# Patient Record
Sex: MALE | Race: BLACK OR AFRICAN AMERICAN | NOT HISPANIC OR LATINO | URBAN - METROPOLITAN AREA
[De-identification: names, ages, dates, MRNs, and addresses within clinical notes are randomized per-mention and may not be internally consistent; named-entity substitution may affect disease eponyms.]

---

## 2018-10-13 ENCOUNTER — INPATIENT (INPATIENT)
Facility: HOSPITAL | Age: 63
LOS: 4 days | Discharge: HOME HEALTH | End: 2018-10-18
Attending: INTERNAL MEDICINE | Admitting: INTERNAL MEDICINE
Payer: MEDICAID

## 2018-10-13 VITALS
OXYGEN SATURATION: 98 % | RESPIRATION RATE: 18 BRPM | DIASTOLIC BLOOD PRESSURE: 96 MMHG | SYSTOLIC BLOOD PRESSURE: 170 MMHG | HEART RATE: 51 BPM | TEMPERATURE: 98 F

## 2018-10-13 DIAGNOSIS — M79.604 PAIN IN RIGHT LEG: ICD-10-CM

## 2018-10-13 DIAGNOSIS — M79.606 PAIN IN LEG, UNSPECIFIED: ICD-10-CM

## 2018-10-13 DIAGNOSIS — I10 ESSENTIAL (PRIMARY) HYPERTENSION: ICD-10-CM

## 2018-10-13 DIAGNOSIS — Z29.9 ENCOUNTER FOR PROPHYLACTIC MEASURES, UNSPECIFIED: ICD-10-CM

## 2018-10-13 DIAGNOSIS — R00.1 BRADYCARDIA, UNSPECIFIED: ICD-10-CM

## 2018-10-13 DIAGNOSIS — E11.9 TYPE 2 DIABETES MELLITUS WITHOUT COMPLICATIONS: ICD-10-CM

## 2018-10-13 DIAGNOSIS — H91.90 UNSPECIFIED HEARING LOSS, UNSPECIFIED EAR: ICD-10-CM

## 2018-10-13 DIAGNOSIS — F17.200 NICOTINE DEPENDENCE, UNSPECIFIED, UNCOMPLICATED: ICD-10-CM

## 2018-10-13 LAB
ALBUMIN SERPL ELPH-MCNC: 4.3 G/DL — SIGNIFICANT CHANGE UP (ref 3.3–5)
ALP SERPL-CCNC: 70 U/L — SIGNIFICANT CHANGE UP (ref 40–120)
ALT FLD-CCNC: 20 U/L — SIGNIFICANT CHANGE UP (ref 4–41)
APPEARANCE UR: CLEAR — SIGNIFICANT CHANGE UP
AST SERPL-CCNC: 21 U/L — SIGNIFICANT CHANGE UP (ref 4–40)
BASOPHILS # BLD AUTO: 0.05 K/UL — SIGNIFICANT CHANGE UP (ref 0–0.2)
BASOPHILS NFR BLD AUTO: 0.5 % — SIGNIFICANT CHANGE UP (ref 0–2)
BILIRUB SERPL-MCNC: 0.4 MG/DL — SIGNIFICANT CHANGE UP (ref 0.2–1.2)
BILIRUB UR-MCNC: NEGATIVE — SIGNIFICANT CHANGE UP
BLOOD UR QL VISUAL: NEGATIVE — SIGNIFICANT CHANGE UP
BUN SERPL-MCNC: 10 MG/DL — SIGNIFICANT CHANGE UP (ref 7–23)
CALCIUM SERPL-MCNC: 8.6 MG/DL — SIGNIFICANT CHANGE UP (ref 8.4–10.5)
CHLORIDE SERPL-SCNC: 100 MMOL/L — SIGNIFICANT CHANGE UP (ref 98–107)
CK SERPL-CCNC: 739 U/L — HIGH (ref 30–200)
CO2 SERPL-SCNC: 24 MMOL/L — SIGNIFICANT CHANGE UP (ref 22–31)
COLOR SPEC: SIGNIFICANT CHANGE UP
CREAT SERPL-MCNC: 0.77 MG/DL — SIGNIFICANT CHANGE UP (ref 0.5–1.3)
EOSINOPHIL # BLD AUTO: 0.16 K/UL — SIGNIFICANT CHANGE UP (ref 0–0.5)
EOSINOPHIL NFR BLD AUTO: 1.5 % — SIGNIFICANT CHANGE UP (ref 0–6)
GLUCOSE SERPL-MCNC: 131 MG/DL — HIGH (ref 70–99)
GLUCOSE UR-MCNC: NEGATIVE — SIGNIFICANT CHANGE UP
HBA1C BLD-MCNC: 6 % — HIGH (ref 4–5.6)
HCT VFR BLD CALC: 46.7 % — SIGNIFICANT CHANGE UP (ref 39–50)
HGB BLD-MCNC: 16 G/DL — SIGNIFICANT CHANGE UP (ref 13–17)
IMM GRANULOCYTES # BLD AUTO: 0.04 # — SIGNIFICANT CHANGE UP
IMM GRANULOCYTES NFR BLD AUTO: 0.4 % — SIGNIFICANT CHANGE UP (ref 0–1.5)
KETONES UR-MCNC: NEGATIVE — SIGNIFICANT CHANGE UP
LEUKOCYTE ESTERASE UR-ACNC: NEGATIVE — SIGNIFICANT CHANGE UP
LYMPHOCYTES # BLD AUTO: 1.69 K/UL — SIGNIFICANT CHANGE UP (ref 1–3.3)
LYMPHOCYTES # BLD AUTO: 15.8 % — SIGNIFICANT CHANGE UP (ref 13–44)
MCHC RBC-ENTMCNC: 31.8 PG — SIGNIFICANT CHANGE UP (ref 27–34)
MCHC RBC-ENTMCNC: 34.3 % — SIGNIFICANT CHANGE UP (ref 32–36)
MCV RBC AUTO: 92.8 FL — SIGNIFICANT CHANGE UP (ref 80–100)
MONOCYTES # BLD AUTO: 0.92 K/UL — HIGH (ref 0–0.9)
MONOCYTES NFR BLD AUTO: 8.6 % — SIGNIFICANT CHANGE UP (ref 2–14)
NEUTROPHILS # BLD AUTO: 7.84 K/UL — HIGH (ref 1.8–7.4)
NEUTROPHILS NFR BLD AUTO: 73.2 % — SIGNIFICANT CHANGE UP (ref 43–77)
NITRITE UR-MCNC: NEGATIVE — SIGNIFICANT CHANGE UP
NRBC # FLD: 0 — SIGNIFICANT CHANGE UP
PH UR: 6.5 — SIGNIFICANT CHANGE UP (ref 5–8)
PLATELET # BLD AUTO: 170 K/UL — SIGNIFICANT CHANGE UP (ref 150–400)
PMV BLD: 12.2 FL — SIGNIFICANT CHANGE UP (ref 7–13)
POTASSIUM SERPL-MCNC: 4.8 MMOL/L — SIGNIFICANT CHANGE UP (ref 3.5–5.3)
POTASSIUM SERPL-SCNC: 4.8 MMOL/L — SIGNIFICANT CHANGE UP (ref 3.5–5.3)
PROT SERPL-MCNC: 7.5 G/DL — SIGNIFICANT CHANGE UP (ref 6–8.3)
PROT UR-MCNC: NEGATIVE — SIGNIFICANT CHANGE UP
RBC # BLD: 5.03 M/UL — SIGNIFICANT CHANGE UP (ref 4.2–5.8)
RBC # FLD: 11.9 % — SIGNIFICANT CHANGE UP (ref 10.3–14.5)
SODIUM SERPL-SCNC: 137 MMOL/L — SIGNIFICANT CHANGE UP (ref 135–145)
SP GR SPEC: 1.02 — SIGNIFICANT CHANGE UP (ref 1–1.04)
UROBILINOGEN FLD QL: NORMAL — SIGNIFICANT CHANGE UP
WBC # BLD: 10.7 K/UL — HIGH (ref 3.8–10.5)
WBC # FLD AUTO: 10.7 K/UL — HIGH (ref 3.8–10.5)

## 2018-10-13 PROCEDURE — 99222 1ST HOSP IP/OBS MODERATE 55: CPT | Mod: GC

## 2018-10-13 RX ORDER — METHOCARBAMOL 500 MG/1
750 TABLET, FILM COATED ORAL
Qty: 0 | Refills: 0 | Status: DISCONTINUED | OUTPATIENT
Start: 2018-10-13 | End: 2018-10-18

## 2018-10-13 RX ORDER — ACYCLOVIR SODIUM 500 MG
VIAL (EA) INTRAVENOUS
Qty: 0 | Refills: 0 | Status: DISCONTINUED | OUTPATIENT
Start: 2018-10-13 | End: 2018-10-14

## 2018-10-13 RX ORDER — DIAZEPAM 5 MG
5 TABLET ORAL ONCE
Qty: 0 | Refills: 0 | Status: DISCONTINUED | OUTPATIENT
Start: 2018-10-13 | End: 2018-10-13

## 2018-10-13 RX ORDER — OXYCODONE AND ACETAMINOPHEN 5; 325 MG/1; MG/1
1 TABLET ORAL ONCE
Qty: 0 | Refills: 0 | Status: DISCONTINUED | OUTPATIENT
Start: 2018-10-13 | End: 2018-10-13

## 2018-10-13 RX ORDER — OXYCODONE HYDROCHLORIDE 5 MG/1
5 TABLET ORAL ONCE
Qty: 0 | Refills: 0 | Status: DISCONTINUED | OUTPATIENT
Start: 2018-10-13 | End: 2018-10-13

## 2018-10-13 RX ORDER — DEXTROSE 50 % IN WATER 50 %
25 SYRINGE (ML) INTRAVENOUS ONCE
Qty: 0 | Refills: 0 | Status: DISCONTINUED | OUTPATIENT
Start: 2018-10-13 | End: 2018-10-16

## 2018-10-13 RX ORDER — INSULIN LISPRO 100/ML
VIAL (ML) SUBCUTANEOUS AT BEDTIME
Qty: 0 | Refills: 0 | Status: DISCONTINUED | OUTPATIENT
Start: 2018-10-13 | End: 2018-10-15

## 2018-10-13 RX ORDER — LIDOCAINE 4 G/100G
1 CREAM TOPICAL ONCE
Qty: 0 | Refills: 0 | Status: COMPLETED | OUTPATIENT
Start: 2018-10-13 | End: 2018-10-13

## 2018-10-13 RX ORDER — DEXTROSE 50 % IN WATER 50 %
15 SYRINGE (ML) INTRAVENOUS ONCE
Qty: 0 | Refills: 0 | Status: DISCONTINUED | OUTPATIENT
Start: 2018-10-13 | End: 2018-10-16

## 2018-10-13 RX ORDER — HYDRALAZINE HCL 50 MG
5 TABLET ORAL EVERY 8 HOURS
Qty: 0 | Refills: 0 | Status: DISCONTINUED | OUTPATIENT
Start: 2018-10-13 | End: 2018-10-14

## 2018-10-13 RX ORDER — SODIUM CHLORIDE 9 MG/ML
1000 INJECTION, SOLUTION INTRAVENOUS
Qty: 0 | Refills: 0 | Status: DISCONTINUED | OUTPATIENT
Start: 2018-10-13 | End: 2018-10-16

## 2018-10-13 RX ORDER — ACYCLOVIR SODIUM 500 MG
900 VIAL (EA) INTRAVENOUS EVERY 8 HOURS
Qty: 0 | Refills: 0 | Status: DISCONTINUED | OUTPATIENT
Start: 2018-10-14 | End: 2018-10-14

## 2018-10-13 RX ORDER — GABAPENTIN 400 MG/1
300 CAPSULE ORAL THREE TIMES A DAY
Qty: 0 | Refills: 0 | Status: DISCONTINUED | OUTPATIENT
Start: 2018-10-13 | End: 2018-10-18

## 2018-10-13 RX ORDER — NICOTINE POLACRILEX 2 MG
1 GUM BUCCAL DAILY
Qty: 0 | Refills: 0 | Status: DISCONTINUED | OUTPATIENT
Start: 2018-10-13 | End: 2018-10-18

## 2018-10-13 RX ORDER — OXYCODONE AND ACETAMINOPHEN 5; 325 MG/1; MG/1
1 TABLET ORAL EVERY 6 HOURS
Qty: 0 | Refills: 0 | Status: DISCONTINUED | OUTPATIENT
Start: 2018-10-13 | End: 2018-10-14

## 2018-10-13 RX ORDER — LIDOCAINE 4 G/100G
1 CREAM TOPICAL DAILY
Qty: 0 | Refills: 0 | Status: DISCONTINUED | OUTPATIENT
Start: 2018-10-13 | End: 2018-10-18

## 2018-10-13 RX ORDER — GABAPENTIN 400 MG/1
100 CAPSULE ORAL THREE TIMES A DAY
Qty: 0 | Refills: 0 | Status: DISCONTINUED | OUTPATIENT
Start: 2018-10-13 | End: 2018-10-13

## 2018-10-13 RX ORDER — DEXTROSE 50 % IN WATER 50 %
12.5 SYRINGE (ML) INTRAVENOUS ONCE
Qty: 0 | Refills: 0 | Status: DISCONTINUED | OUTPATIENT
Start: 2018-10-13 | End: 2018-10-16

## 2018-10-13 RX ORDER — INSULIN LISPRO 100/ML
VIAL (ML) SUBCUTANEOUS
Qty: 0 | Refills: 0 | Status: DISCONTINUED | OUTPATIENT
Start: 2018-10-13 | End: 2018-10-15

## 2018-10-13 RX ORDER — GLUCAGON INJECTION, SOLUTION 0.5 MG/.1ML
1 INJECTION, SOLUTION SUBCUTANEOUS ONCE
Qty: 0 | Refills: 0 | Status: DISCONTINUED | OUTPATIENT
Start: 2018-10-13 | End: 2018-10-16

## 2018-10-13 RX ORDER — ENOXAPARIN SODIUM 100 MG/ML
40 INJECTION SUBCUTANEOUS DAILY
Qty: 0 | Refills: 0 | Status: DISCONTINUED | OUTPATIENT
Start: 2018-10-13 | End: 2018-10-18

## 2018-10-13 RX ORDER — ACYCLOVIR SODIUM 500 MG
900 VIAL (EA) INTRAVENOUS ONCE
Qty: 0 | Refills: 0 | Status: COMPLETED | OUTPATIENT
Start: 2018-10-13 | End: 2018-10-13

## 2018-10-13 RX ORDER — AMLODIPINE BESYLATE 2.5 MG/1
2.5 TABLET ORAL DAILY
Qty: 0 | Refills: 0 | Status: DISCONTINUED | OUTPATIENT
Start: 2018-10-14 | End: 2018-10-14

## 2018-10-13 RX ADMIN — GABAPENTIN 100 MILLIGRAM(S): 400 CAPSULE ORAL at 17:06

## 2018-10-13 RX ADMIN — METHOCARBAMOL 750 MILLIGRAM(S): 500 TABLET, FILM COATED ORAL at 18:49

## 2018-10-13 RX ADMIN — Medication 5 MILLIGRAM(S): at 11:38

## 2018-10-13 RX ADMIN — OXYCODONE HYDROCHLORIDE 5 MILLIGRAM(S): 5 TABLET ORAL at 22:00

## 2018-10-13 RX ADMIN — LIDOCAINE 1 PATCH: 4 CREAM TOPICAL at 11:38

## 2018-10-13 RX ADMIN — LIDOCAINE 1 PATCH: 4 CREAM TOPICAL at 23:00

## 2018-10-13 RX ADMIN — OXYCODONE HYDROCHLORIDE 5 MILLIGRAM(S): 5 TABLET ORAL at 21:30

## 2018-10-13 RX ADMIN — GABAPENTIN 300 MILLIGRAM(S): 400 CAPSULE ORAL at 21:30

## 2018-10-13 RX ADMIN — OXYCODONE AND ACETAMINOPHEN 1 TABLET(S): 5; 325 TABLET ORAL at 11:38

## 2018-10-13 RX ADMIN — OXYCODONE AND ACETAMINOPHEN 1 TABLET(S): 5; 325 TABLET ORAL at 14:00

## 2018-10-13 RX ADMIN — OXYCODONE AND ACETAMINOPHEN 1 TABLET(S): 5; 325 TABLET ORAL at 17:57

## 2018-10-13 RX ADMIN — Medication 268 MILLIGRAM(S): at 21:59

## 2018-10-13 NOTE — H&P ADULT - PROBLEM SELECTOR PLAN 1
Differential includes neuropathy, radiculopathy (patient with history of "nerve damage" according to Neurologist), less likely DVT as without calf tenderness or swelling, no signs of cord compression, no signs of vascular compromise   - Without focal deficits on exam   - Likely worsening of his chronic lower extremity pain  - Lidoderm patch  - will continue with mucle relaxant, methocarbomal  - can try gabapentin 200 mg TID   - Motrin 400 mg PRN for severe pain   - PT consult   -  - will need to get in touch with Neurologist, Dr. Denton, to find out about recent MRI of neck/back Differential includes neuropathy, radiculopathy (patient with history of "nerve damage" according to Neurologist), less likely DVT as without calf tenderness or swelling, no signs of cord compression, no signs of vascular compromise   - Without focal deficits on exam   - Likely worsening of his chronic lower extremity pain  - Lidoderm patch  - will continue with mucle relaxant, methocarbomal  - can try gabapentin 200 mg TID   - Motrin 400 mg PRN for severe pain   - PT consult   - will need to get in touch with Neurologist, Dr. Denton, to find out about recent MRI of neck/back Differential includes neuropathy, radiculopathy (patient with history of "nerve damage" according to Neurologist), no signs of cord compression, no signs of vascular compromise   - Without focal deficits on exam   - Likely worsening of his chronic lower extremity pain  - Lidoderm patch  - will continue with mucle relaxant, methocarbomal, 750 mg BID  - will start gabapentin 100 mg TID   - PT consult   - will need to get in touch with Neurologist, Dr. Denton, to find out about recent MRI of neck/back  - less likely DVT as without calf tenderness or swelling, but will get Duplex LE Differential includes neuropathy, radiculopathy (patient with history of "nerve damage" according to Neurologist), no signs of cord compression, no signs of vascular compromise   - Without focal deficits on exam   - Lesion on RLE may be Varicella lesions, confined to anterior shin, will start Acyclovir   - Lidoderm patch  - will continue with mucle relaxant, methocarbomal, 750 mg BID  - will start gabapentin 100 mg TID   - PT consult   - will need to get in touch with Neurologist, Dr. Denton, to find out about recent MRI of neck/back  - less likely DVT as without calf tenderness or swelling, but will get Duplex LE

## 2018-10-13 NOTE — H&P ADULT - NSHPLABSRESULTS_GEN_ALL_CORE
LABS AND EKG PERSONALLY REVIEWED:                              16.0   10.70 )-----------( 170      ( 13 Oct 2018 11:40 )             46.7       10-13    137  |  100  |  10  ----------------------------<  131<H>  4.8   |  24  |  0.77    Ca    8.6      13 Oct 2018 11:40    TPro  7.5  /  Alb  4.3  /  TBili  0.4  /  DBili  x   /  AST  21  /  ALT  20  /  AlkPhos  70  10              Urinalysis Basic - ( 13 Oct 2018 12:00 )    Color: LIGHT YELLOW / Appearance: CLEAR / S.017 / pH: 6.5  Gluc: NEGATIVE / Ketone: NEGATIVE  / Bili: NEGATIVE / Urobili: NORMAL   Blood: NEGATIVE / Protein: NEGATIVE / Nitrite: NEGATIVE   Leuk Esterase: NEGATIVE / RBC: x / WBC x   Sq Epi: x / Non Sq Epi: x / Bacteria: x          CARDIAC MARKERS ( 13 Oct 2018 11:40 )  x     / x     / 739 u/L / x     / x            POCT Blood Glucose.: 130 mg/dL (13 Oct 2018 11:08)          EKG: Sinus bradycardia

## 2018-10-13 NOTE — H&P ADULT - NSHPSOCIALHISTORY_GEN_ALL_CORE
Patient is active smoker, has been smoking 1.5 PPD for 40 years. Patient denies alcohol or drug use.

## 2018-10-13 NOTE — ED PROVIDER NOTE - NONTENDER LOCATION
left upper quadrant/left lower quadrant/umbilical/right costovertebral angle/right upper quadrant/right lower quadrant/periumbilical/suprapubic/left costovertebral angle

## 2018-10-13 NOTE — ED PROVIDER NOTE - PROGRESS NOTE DETAILS
MARYSOL ESTRELLA: Pt notes improvement in pain. Pt unable to ambulate independently. No call back from PMD Dr. King at this time.

## 2018-10-13 NOTE — H&P ADULT - NSHPREVIEWOFSYSTEMS_GEN_ALL_CORE
Review of Systems:  Constitutional: No fever, No weight loss, poor appetite/po intake  Eyes: No blurry vision, No diplopia  Neuro: + right leg numbness   Cardiovascular: No chest pain, No palpitations  Respiratory: No SOB, No cough  GI: No nausea, No vomiting, No diarrhea  : No dysuria, No hematuria  Skin: No rash  Extremities: + burning right LE pain

## 2018-10-13 NOTE — H&P ADULT - PROBLEM SELECTOR PLAN 4
Patient with history if hearing impairment  - Hearing aid left ear Patient presenting with bradycardia HR 45-50  - EKG sinus bradycardia  - asymptomatic  - will continue to monitor  - will get repeat EKG in am

## 2018-10-13 NOTE — H&P ADULT - PROBLEM SELECTOR PLAN 3
A1C 6.0, not on any medications at home   -will start start ISS and monitor FSG’s  -Carb consistent diet

## 2018-10-13 NOTE — ED PROVIDER NOTE - ATTENDING CONTRIBUTION TO CARE
Dr. More: 61 yo male with DM, chronic LE pain (has seen a neurologist for this and was told it was a "nerve problem"), on chronic pain medication (NSAID and oxycodone), in ED with worsening pain and numbness to right LE since this morning.  Pt has been able to walk since this began, states pain was not relieved with ibuprofen and oxycodone that he normally takes for this.  No recent falls or known injuries.  No urinary or fecal incontinence or retention.  No CP/SOB, N/V/D.  On exam pt uncomfortable appearing, in moderate distress due to leg pain, heart RRR, lungs CTAB, abd NTND, extremities without swelling, both LEs compartments soft and nontender, strength 5/5 in all extremities when laying in bed and skin without rash.  Pt states sensation to right shin is decreased. Dr. More: 63 yo male with DM, chronic LE pain (has seen a neurologist for this and was told it was a "nerve problem"), on chronic pain medication (NSAID and oxycodone), in ED with worsening pain and numbness to right LE since this morning.  Pt has been able to walk since this began, states pain was not relieved with ibuprofen and oxycodone that he normally takes for this.  No recent falls or known injuries.  No urinary or fecal incontinence or retention.  No CP/SOB, N/V/D.  On exam pt uncomfortable appearing, in moderate distress due to leg pain, heart RRR, lungs CTAB, abd NTND, extremities without swelling, both LEs compartments soft and nontender, strength 5/5 in all extremities when laying in bed and skin without rash.  Pt states sensation to right shin is decreased.  Neck and back midline nontender to palpation.

## 2018-10-13 NOTE — PATIENT PROFILE ADULT - LAST BOWEL MOVEMENT DATE
- sob and O2 requirements  - chest x-ray with pulmonary edema  - 2 D echo from 7/2 with EF of 30-35%, pulmonary HTN and severe L atrial enlargement  - cardiology consulted, appreciate recs  - Lopressor dc'd and lisinopril and Toprol XL started  - cardiology follow-up as outpatient in 3 months   12-Oct-2018

## 2018-10-13 NOTE — ED PROVIDER NOTE - OBJECTIVE STATEMENT
Pt is a 63 y/o M smoker PMHx diabetes, chronic back and bilateral lower extremity pain p/w numbness and pain to right leg today.  Pt states he has had chronic bilateral lower extremity pain (right worse than left) for past five years due to being diagnosed with "nerve damage."  Pt states for past 2-3 years, pt has been unable to move the toes in his left foot.  Pt states today, he has worsening burning pain to anterior aspect of right lower extremity associated with numbness to right shin.  Pt note pain worsens with movement and walking.  Pt took Motrin and Oxycodone today with minimal relief.  Pt notes able to urinate without difficulty.  Last BM four days ago, which is baseline for patient.  Denies any fevers, chills, falls, trauma, fecal/urinary incontinence, illicit drug use.

## 2018-10-13 NOTE — H&P ADULT - ASSESSMENT
63 y/o male with PMHx of diabetes (no longer on medications), hearing impairment, chronic back and bilateral lower extremity pain, active smoker admitted for worsening right leg pain and numbness.

## 2018-10-13 NOTE — H&P ADULT - ATTENDING COMMENTS
Jose Bhat is a 61 y/o male with PMHx of diabetes (no longer on medications), hearing impairment, chronic back and bilateral lower extremity pain, active smoker admitted for worsening right leg pain and numbness compromising his ambulation.  On physical exam sporadic, small, non-crested tiny vesicular lesions noted along the anterior aspect of the right shins. Sensation intact with preserved motor strength. SBP on admission >200.  Differential includes varicella/preherpertic nueralgia vs worsening underlying neuropathy    Plan  Start acyclovir and gabapentin  obtain us to r/o dvt  monitor BP, hydralazine for SBP>180 and start amlodipine tomorrow

## 2018-10-13 NOTE — ED ADULT TRIAGE NOTE - CHIEF COMPLAINT QUOTE
Patient brought to ER from home by EMS for c/o lower leg extremities'. Pt states it feels like burning and it is causing him not to be able to walk.

## 2018-10-13 NOTE — ED ADULT NURSE NOTE - NSIMPLEMENTINTERV_GEN_ALL_ED
Implemented All Fall Risk Interventions:  [place] to call system. Call bell, personal items and telephone within reach. Instruct patient to call for assistance. Room bathroom lighting operational. Non-slip footwear when patient is off stretcher. Physically safe environment: no spills, clutter or unnecessary equipment. Stretcher in lowest position, wheels locked, appropriate side rails in place. Provide visual cue, wrist band, yellow gown, etc. Monitor gait and stability. Monitor for mental status changes and reorient to person, place, and time. Review medications for side effects contributing to fall risk. Reinforce activity limits and safety measures with patient and family.

## 2018-10-13 NOTE — H&P ADULT - PROBLEM SELECTOR PLAN 2
Patient with SBP up to 204 in ED, improved to 140/71 without intervention  - Likely related to pain  - continue to monitor BP, would consider starting BP medication if continues to be elevated Patient with SBP up to 204 in ED, improved to 140/71 without intervention  - Likely related to pain  - continue to monitor BP, will start amlodipine 2.5 mg if SBP >150  - Hydralazine 5 mg IV if SBP >180

## 2018-10-13 NOTE — H&P ADULT - PROBLEM SELECTOR PLAN 5
Active smoker, 40 year history of 1.5 PPD  - will start Nicotine patch Patient with history if hearing impairment  - Hearing aid left ear

## 2018-10-13 NOTE — ED ADULT NURSE REASSESSMENT NOTE - NS ED NURSE REASSESS COMMENT FT1
pt became asymptomatic bradycardic (45-50) after medication, MD/PA notified, EKG done-sinus kika, other VSS, pt mentating well, states pain is better but numbness continues and unable to ambulate, will continue to monitor

## 2018-10-13 NOTE — ED PROVIDER NOTE - MEDICAL DECISION MAKING DETAILS
Pt is a 63 y/o M smoker PMHx diabetes, chronic back and bilateral lower extremity pain p/w numbness and pain to right leg today -- possible neuropathy, possible radiculopathy, given warm extremities without vascular compromise, not concerning for arterial occlusion -- labs, pain control

## 2018-10-13 NOTE — H&P ADULT - NSHPPHYSICALEXAM_GEN_ALL_CORE
Vital Signs Last 24 Hrs  T(C): 36.8 (13 Oct 2018 15:00), Max: 36.8 (13 Oct 2018 15:00)  T(F): 98.2 (13 Oct 2018 15:00), Max: 98.2 (13 Oct 2018 15:00)  HR: 45 (13 Oct 2018 15:00) (45 - 59)  BP: 140/71 (13 Oct 2018 15:00) (140/71 - 204/90)  BP(mean): --  RR: 17 (13 Oct 2018 15:00) (16 - 18)  SpO2: 100% (13 Oct 2018 15:00) (98% - 100%)      PHYSICAL EXAM  GENERAL: NAD, lying comfortably in bed   HEAD:  Atraumatic, Normocephalic  EYES: EOMI b/l, PERRLA b/l, conjunctiva and sclera clear  NECK: Supple, No JVD, No LAD   CHEST/LUNG: Clear to auscultation bilaterally; No wheeze or ronchi  HEART: Regular rate and rhythm; S1 and S2 present, No murmurs, rubs, or gallops  ABDOMEN: Soft, Nontender, Nondistended; Bowel sounds present  EXTREMITIES:  2+ Peripheral Pulses, No clubbing, cyanosis, or edema. No calf tenderness.   BACK: no spinal tenderness  NEURO: AAOx3, 5/5 strength both LE's bilaterally, endorsing diminished sensation right shin   SKIN: No rashes or lesions Vital Signs Last 24 Hrs  T(C): 36.8 (13 Oct 2018 15:00), Max: 36.8 (13 Oct 2018 15:00)  T(F): 98.2 (13 Oct 2018 15:00), Max: 98.2 (13 Oct 2018 15:00)  HR: 45 (13 Oct 2018 15:00) (45 - 59)  BP: 140/71 (13 Oct 2018 15:00) (140/71 - 204/90)  BP(mean): --  RR: 17 (13 Oct 2018 15:00) (16 - 18)  SpO2: 100% (13 Oct 2018 15:00) (98% - 100%)      PHYSICAL EXAM  GENERAL: NAD, lying comfortably in bed   HEAD:  Atraumatic, Normocephalic  EYES: EOMI b/l, PERRLA b/l, conjunctiva and sclera clear  NECK: Supple, No JVD, No LAD   CHEST/LUNG: Clear to auscultation bilaterally; No wheeze or ronchi  HEART: Regular rate and rhythm; S1 and S2 present, No murmurs, rubs, or gallops  ABDOMEN: Soft, Nontender, Nondistended; Bowel sounds present  EXTREMITIES:  2+ Peripheral Pulses, No clubbing, cyanosis, or edema. No calf tenderness.   BACK: no spinal tenderness  NEURO: AAOx3, 5/5 strength both LE's bilaterally, endorsing diminished sensation right shin   SKIN: small circular red papules on anterior right shin

## 2018-10-13 NOTE — H&P ADULT - PROBLEM SELECTOR PLAN 6
-DVT PPx: Lovenox 40   -Diet: Carb consistent, low sodium Active smoker, 40 year history of 1.5 PPD  - will start Nicotine patch

## 2018-10-13 NOTE — ED ADULT NURSE NOTE - OBJECTIVE STATEMENT
received pt to rm 11 c/o b/l LE pain received pt to rm 11 c/o b/l LE pain and numbness, worse on rt, pt poor historian, unable/no willing to state when it started but admitted to being prescribed pain meds previously for same, pt A&Ox3, hypertensive-MD at bedside, c/o pain 6 out of 10, denies taking any medication at home for any comorbidities, MD zamora on going at this time. received pt to rm 11 c/o b/l LE pain and numbness, worse on rt, pt poor historian, unable/not willing to state when it started but admitted to being prescribed pain meds previously for same, pt A&Ox3, hypertensive-MD at bedside, c/o pain 6 out of 10, denies taking any medication at home for any comorbidities, pt states at baseline uses can to ambulate with no additional assistance,  MD zamora on going at this time. received pt to rm 11 c/o b/l LE pain and numbness, worse on rt, pt poor historian, unable/not willing to state when it started but admitted to being prescribed pain meds previously for same, pt A&Ox3, hypertensive-MD at bedside, c/o pain 6 out of 10, denies taking any medication at home for any comorbidities, pt states at baseline uses cane to ambulate with no additional assistance,  MD zamora on going at this time.

## 2018-10-14 LAB
BASOPHILS # BLD AUTO: 0.04 K/UL — SIGNIFICANT CHANGE UP (ref 0–0.2)
BASOPHILS NFR BLD AUTO: 0.4 % — SIGNIFICANT CHANGE UP (ref 0–2)
BUN SERPL-MCNC: 11 MG/DL — SIGNIFICANT CHANGE UP (ref 7–23)
BUN SERPL-MCNC: 11 MG/DL — SIGNIFICANT CHANGE UP (ref 7–23)
CALCIUM SERPL-MCNC: 8.3 MG/DL — LOW (ref 8.4–10.5)
CALCIUM SERPL-MCNC: 8.3 MG/DL — LOW (ref 8.4–10.5)
CHLORIDE SERPL-SCNC: 101 MMOL/L — SIGNIFICANT CHANGE UP (ref 98–107)
CHLORIDE SERPL-SCNC: 101 MMOL/L — SIGNIFICANT CHANGE UP (ref 98–107)
CK SERPL-CCNC: 601 U/L — HIGH (ref 30–200)
CO2 SERPL-SCNC: 23 MMOL/L — SIGNIFICANT CHANGE UP (ref 22–31)
CO2 SERPL-SCNC: 23 MMOL/L — SIGNIFICANT CHANGE UP (ref 22–31)
CREAT SERPL-MCNC: 0.75 MG/DL — SIGNIFICANT CHANGE UP (ref 0.5–1.3)
CREAT SERPL-MCNC: 0.75 MG/DL — SIGNIFICANT CHANGE UP (ref 0.5–1.3)
EOSINOPHIL # BLD AUTO: 0.08 K/UL — SIGNIFICANT CHANGE UP (ref 0–0.5)
EOSINOPHIL NFR BLD AUTO: 0.8 % — SIGNIFICANT CHANGE UP (ref 0–6)
GLUCOSE SERPL-MCNC: 129 MG/DL — HIGH (ref 70–99)
GLUCOSE SERPL-MCNC: 129 MG/DL — HIGH (ref 70–99)
HCT VFR BLD CALC: 41.3 % — SIGNIFICANT CHANGE UP (ref 39–50)
HCT VFR BLD CALC: 41.3 % — SIGNIFICANT CHANGE UP (ref 39–50)
HGB BLD-MCNC: 14.1 G/DL — SIGNIFICANT CHANGE UP (ref 13–17)
HGB BLD-MCNC: 14.1 G/DL — SIGNIFICANT CHANGE UP (ref 13–17)
IMM GRANULOCYTES # BLD AUTO: 0.03 # — SIGNIFICANT CHANGE UP
IMM GRANULOCYTES NFR BLD AUTO: 0.3 % — SIGNIFICANT CHANGE UP (ref 0–1.5)
LYMPHOCYTES # BLD AUTO: 1.68 K/UL — SIGNIFICANT CHANGE UP (ref 1–3.3)
LYMPHOCYTES # BLD AUTO: 16.6 % — SIGNIFICANT CHANGE UP (ref 13–44)
MAGNESIUM SERPL-MCNC: 2.1 MG/DL — SIGNIFICANT CHANGE UP (ref 1.6–2.6)
MCHC RBC-ENTMCNC: 31.2 PG — SIGNIFICANT CHANGE UP (ref 27–34)
MCHC RBC-ENTMCNC: 31.2 PG — SIGNIFICANT CHANGE UP (ref 27–34)
MCHC RBC-ENTMCNC: 34.1 % — SIGNIFICANT CHANGE UP (ref 32–36)
MCHC RBC-ENTMCNC: 34.1 % — SIGNIFICANT CHANGE UP (ref 32–36)
MCV RBC AUTO: 91.4 FL — SIGNIFICANT CHANGE UP (ref 80–100)
MCV RBC AUTO: 91.4 FL — SIGNIFICANT CHANGE UP (ref 80–100)
MONOCYTES # BLD AUTO: 0.82 K/UL — SIGNIFICANT CHANGE UP (ref 0–0.9)
MONOCYTES NFR BLD AUTO: 8.1 % — SIGNIFICANT CHANGE UP (ref 2–14)
NEUTROPHILS # BLD AUTO: 7.5 K/UL — HIGH (ref 1.8–7.4)
NEUTROPHILS NFR BLD AUTO: 73.8 % — SIGNIFICANT CHANGE UP (ref 43–77)
NRBC # FLD: 0 — SIGNIFICANT CHANGE UP
NRBC # FLD: 0 — SIGNIFICANT CHANGE UP
PHOSPHATE SERPL-MCNC: 3.4 MG/DL — SIGNIFICANT CHANGE UP (ref 2.5–4.5)
PLATELET # BLD AUTO: 184 K/UL — SIGNIFICANT CHANGE UP (ref 150–400)
PLATELET # BLD AUTO: 184 K/UL — SIGNIFICANT CHANGE UP (ref 150–400)
PMV BLD: 12.1 FL — SIGNIFICANT CHANGE UP (ref 7–13)
PMV BLD: 12.1 FL — SIGNIFICANT CHANGE UP (ref 7–13)
POTASSIUM SERPL-MCNC: 3.9 MMOL/L — SIGNIFICANT CHANGE UP (ref 3.5–5.3)
POTASSIUM SERPL-MCNC: 3.9 MMOL/L — SIGNIFICANT CHANGE UP (ref 3.5–5.3)
POTASSIUM SERPL-SCNC: 3.9 MMOL/L — SIGNIFICANT CHANGE UP (ref 3.5–5.3)
POTASSIUM SERPL-SCNC: 3.9 MMOL/L — SIGNIFICANT CHANGE UP (ref 3.5–5.3)
RBC # BLD: 4.52 M/UL — SIGNIFICANT CHANGE UP (ref 4.2–5.8)
RBC # BLD: 4.52 M/UL — SIGNIFICANT CHANGE UP (ref 4.2–5.8)
RBC # FLD: 11.7 % — SIGNIFICANT CHANGE UP (ref 10.3–14.5)
RBC # FLD: 11.7 % — SIGNIFICANT CHANGE UP (ref 10.3–14.5)
SODIUM SERPL-SCNC: 138 MMOL/L — SIGNIFICANT CHANGE UP (ref 135–145)
SODIUM SERPL-SCNC: 138 MMOL/L — SIGNIFICANT CHANGE UP (ref 135–145)
SPECIMEN SOURCE: SIGNIFICANT CHANGE UP
WBC # BLD: 10.15 K/UL — SIGNIFICANT CHANGE UP (ref 3.8–10.5)
WBC # BLD: 10.15 K/UL — SIGNIFICANT CHANGE UP (ref 3.8–10.5)
WBC # FLD AUTO: 10.15 K/UL — SIGNIFICANT CHANGE UP (ref 3.8–10.5)
WBC # FLD AUTO: 10.15 K/UL — SIGNIFICANT CHANGE UP (ref 3.8–10.5)

## 2018-10-14 PROCEDURE — 72110 X-RAY EXAM L-2 SPINE 4/>VWS: CPT | Mod: 26

## 2018-10-14 PROCEDURE — 99233 SBSQ HOSP IP/OBS HIGH 50: CPT | Mod: GC

## 2018-10-14 RX ORDER — CAPSAICIN 0.025 %
1 CREAM (GRAM) TOPICAL
Qty: 0 | Refills: 0 | Status: DISCONTINUED | OUTPATIENT
Start: 2018-10-14 | End: 2018-10-14

## 2018-10-14 RX ORDER — OXYCODONE HYDROCHLORIDE 5 MG/1
5 TABLET ORAL EVERY 6 HOURS
Qty: 0 | Refills: 0 | Status: DISCONTINUED | OUTPATIENT
Start: 2018-10-14 | End: 2018-10-14

## 2018-10-14 RX ORDER — KETOROLAC TROMETHAMINE 30 MG/ML
30 SYRINGE (ML) INJECTION ONCE
Qty: 0 | Refills: 0 | Status: DISCONTINUED | OUTPATIENT
Start: 2018-10-14 | End: 2018-10-14

## 2018-10-14 RX ORDER — OXYCODONE HYDROCHLORIDE 5 MG/1
5 TABLET ORAL ONCE
Qty: 0 | Refills: 0 | Status: DISCONTINUED | OUTPATIENT
Start: 2018-10-14 | End: 2018-10-18

## 2018-10-14 RX ORDER — LIDOCAINE 4 G/100G
1 CREAM TOPICAL DAILY
Qty: 0 | Refills: 0 | Status: DISCONTINUED | OUTPATIENT
Start: 2018-10-14 | End: 2018-10-18

## 2018-10-14 RX ORDER — AMLODIPINE BESYLATE 2.5 MG/1
5 TABLET ORAL DAILY
Qty: 0 | Refills: 0 | Status: DISCONTINUED | OUTPATIENT
Start: 2018-10-14 | End: 2018-10-14

## 2018-10-14 RX ORDER — OXYCODONE HYDROCHLORIDE 5 MG/1
5 TABLET ORAL ONCE
Qty: 0 | Refills: 0 | Status: DISCONTINUED | OUTPATIENT
Start: 2018-10-14 | End: 2018-10-14

## 2018-10-14 RX ORDER — AMLODIPINE BESYLATE 2.5 MG/1
2.5 TABLET ORAL ONCE
Qty: 0 | Refills: 0 | Status: COMPLETED | OUTPATIENT
Start: 2018-10-14 | End: 2018-10-14

## 2018-10-14 RX ORDER — AMLODIPINE BESYLATE 2.5 MG/1
5 TABLET ORAL DAILY
Qty: 0 | Refills: 0 | Status: DISCONTINUED | OUTPATIENT
Start: 2018-10-15 | End: 2018-10-16

## 2018-10-14 RX ORDER — OXYCODONE HYDROCHLORIDE 5 MG/1
10 TABLET ORAL EVERY 6 HOURS
Qty: 0 | Refills: 0 | Status: DISCONTINUED | OUTPATIENT
Start: 2018-10-14 | End: 2018-10-18

## 2018-10-14 RX ORDER — IBUPROFEN 200 MG
600 TABLET ORAL EVERY 6 HOURS
Qty: 0 | Refills: 0 | Status: DISCONTINUED | OUTPATIENT
Start: 2018-10-14 | End: 2018-10-18

## 2018-10-14 RX ADMIN — OXYCODONE HYDROCHLORIDE 10 MILLIGRAM(S): 5 TABLET ORAL at 23:51

## 2018-10-14 RX ADMIN — LIDOCAINE 1 PATCH: 4 CREAM TOPICAL at 21:54

## 2018-10-14 RX ADMIN — OXYCODONE HYDROCHLORIDE 10 MILLIGRAM(S): 5 TABLET ORAL at 12:17

## 2018-10-14 RX ADMIN — GABAPENTIN 300 MILLIGRAM(S): 400 CAPSULE ORAL at 13:57

## 2018-10-14 RX ADMIN — METHOCARBAMOL 750 MILLIGRAM(S): 500 TABLET, FILM COATED ORAL at 06:34

## 2018-10-14 RX ADMIN — Medication 268 MILLIGRAM(S): at 06:33

## 2018-10-14 RX ADMIN — OXYCODONE HYDROCHLORIDE 10 MILLIGRAM(S): 5 TABLET ORAL at 18:39

## 2018-10-14 RX ADMIN — METHOCARBAMOL 750 MILLIGRAM(S): 500 TABLET, FILM COATED ORAL at 17:51

## 2018-10-14 RX ADMIN — GABAPENTIN 300 MILLIGRAM(S): 400 CAPSULE ORAL at 06:34

## 2018-10-14 RX ADMIN — Medication 1 PATCH: at 11:42

## 2018-10-14 RX ADMIN — LIDOCAINE 1 PATCH: 4 CREAM TOPICAL at 11:42

## 2018-10-14 RX ADMIN — OXYCODONE AND ACETAMINOPHEN 1 TABLET(S): 5; 325 TABLET ORAL at 00:45

## 2018-10-14 RX ADMIN — OXYCODONE AND ACETAMINOPHEN 1 TABLET(S): 5; 325 TABLET ORAL at 09:54

## 2018-10-14 RX ADMIN — AMLODIPINE BESYLATE 2.5 MILLIGRAM(S): 2.5 TABLET ORAL at 11:42

## 2018-10-14 RX ADMIN — GABAPENTIN 300 MILLIGRAM(S): 400 CAPSULE ORAL at 21:54

## 2018-10-14 RX ADMIN — OXYCODONE AND ACETAMINOPHEN 1 TABLET(S): 5; 325 TABLET ORAL at 01:15

## 2018-10-14 RX ADMIN — Medication 30 MILLIGRAM(S): at 21:54

## 2018-10-14 RX ADMIN — LIDOCAINE 1 PATCH: 4 CREAM TOPICAL at 22:45

## 2018-10-14 RX ADMIN — OXYCODONE HYDROCHLORIDE 5 MILLIGRAM(S): 5 TABLET ORAL at 03:54

## 2018-10-14 RX ADMIN — ENOXAPARIN SODIUM 40 MILLIGRAM(S): 100 INJECTION SUBCUTANEOUS at 11:42

## 2018-10-14 RX ADMIN — AMLODIPINE BESYLATE 2.5 MILLIGRAM(S): 2.5 TABLET ORAL at 06:34

## 2018-10-14 RX ADMIN — OXYCODONE HYDROCHLORIDE 10 MILLIGRAM(S): 5 TABLET ORAL at 17:51

## 2018-10-14 RX ADMIN — OXYCODONE AND ACETAMINOPHEN 1 TABLET(S): 5; 325 TABLET ORAL at 10:43

## 2018-10-14 RX ADMIN — OXYCODONE HYDROCHLORIDE 5 MILLIGRAM(S): 5 TABLET ORAL at 04:30

## 2018-10-14 RX ADMIN — Medication 30 MILLIGRAM(S): at 22:20

## 2018-10-14 RX ADMIN — OXYCODONE HYDROCHLORIDE 10 MILLIGRAM(S): 5 TABLET ORAL at 13:17

## 2018-10-14 NOTE — PROGRESS NOTE ADULT - PROBLEM SELECTOR PLAN 4
Patient presenting with bradycardia HR 45-50  - EKG sinus bradycardia  - asymptomatic  - will continue to monitor  - will get repeat EKG in am Patient presenting with bradycardia HR 45-50  - EKG sinus bradycardia  - asymptomatic  - will continue to monitor

## 2018-10-14 NOTE — PHYSICAL THERAPY INITIAL EVALUATION ADULT - IMPAIRMENTS CONTRIBUTING IMPAIRED BED MOBILITY, REHAB EVAL
impaired balance/Pt. in agreement to participate in skilled therapy session despite complaints of pain./decreased strength/narrow base of support/pain

## 2018-10-14 NOTE — PROGRESS NOTE ADULT - SUBJECTIVE AND OBJECTIVE BOX
Luis Minor, PGY1  Pager [phone]    Patient is a 62y old  Male who presents with a chief complaint of Leg numbness/pain (14 Oct 2018 06:42)      SUBJECTIVE/INTERVAL EVENTS: Patient seen and examined at bedside. BASIL o/n. No acute change in symptoms. Continues to have numbness, tingling, and pain of RLE, pain in LLE and lower back as well. Has intermittent headaches exacerbated by leg pain.    MEDICATIONS  (STANDING):  acyclovir IVPB      acyclovir IVPB 900 milliGRAM(s) IV Intermittent every 8 hours  amLODIPine   Tablet 2.5 milliGRAM(s) Oral daily  dextrose 5%. 1000 milliLiter(s) (50 mL/Hr) IV Continuous <Continuous>  dextrose 50% Injectable 12.5 Gram(s) IV Push once  dextrose 50% Injectable 25 Gram(s) IV Push once  dextrose 50% Injectable 25 Gram(s) IV Push once  enoxaparin Injectable 40 milliGRAM(s) SubCutaneous daily  gabapentin 300 milliGRAM(s) Oral three times a day  insulin lispro (HumaLOG) corrective regimen sliding scale   SubCutaneous three times a day before meals  insulin lispro (HumaLOG) corrective regimen sliding scale   SubCutaneous at bedtime  lidocaine   Patch 1 Patch Transdermal daily  methocarbamol 750 milliGRAM(s) Oral two times a day  nicotine -   7 mG/24Hr(s) Patch 1 patch Transdermal daily    MEDICATIONS  (PRN):  dextrose 40% Gel 15 Gram(s) Oral once PRN Blood Glucose LESS THAN 70 milliGRAM(s)/deciliter  glucagon  Injectable 1 milliGRAM(s) IntraMuscular once PRN Glucose LESS THAN 70 milligrams/deciliter  hydrALAZINE Injectable 5 milliGRAM(s) IV Push every 8 hours PRN if SBP >180  oxyCODONE    5 mG/acetaminophen 325 mG 1 Tablet(s) Oral every 6 hours PRN Severe Pain (7 - 10)      VITAL SIGNS:  T(F): 98.8 (10-14-18 @ 09:41), Max: 98.8 (10-14-18 @ 09:41)  HR: 58 (10-14-18 @ 09:41) (44 - 59)  BP: 169/98 (10-14-18 @ 09:41) (140/71 - 204/90)  RR: 17 (10-14-18 @ 09:41) (16 - 20)  SpO2: 100% (10-14-18 @ 09:41) (98% - 100%)    I&O's Summary    Daily Height in cm: 185.42 (13 Oct 2018 17:12)    Daily     PHYSICAL EXAM:  Gen: Alert, well-developed, NAD  HEENT: NCAT, PERRL, EOMI, conjunctiva clear, sclera anicteric  Neck: Supple, no JVD  CV: RRR, S1S2, no m/r/g  Resp: CTAB, normal respiratory effort  Abd: Soft, nontender, nondistended, normal bowel sounds  Ext: + peripheral pulses, no edema, clubbing, or cyanosis  Neuro: AOx3, no focal deficits, CANDELARIA but LE and back ROM pain limited  SKIN: right shin vesicular crusted appearing lesions    LABS:                        14.1   1015 )-----------( 184      ( 14 Oct 2018 06:07 )             41.3     10-14    138  |  101  |  11  ----------------------------<  129<H>  3.9   |  23  |  0.75    Ca    8.3<L>      14 Oct 2018 06:07  Phos  3.4     10-14  Mg     2.1     10-14    TPro  7.5  /  Alb  4.3  /  TBili  0.4  /  DBili  x   /  AST  21  /  ALT  20  /  AlkPhos  70  10-13    LIVER FUNCTIONS - ( 13 Oct 2018 11:40 )  Alb: 4.3 g/dL / Pro: 7.5 g/dL / ALK PHOS: 70 u/L / ALT: 20 u/L / AST: 21 u/L / GGT: x             CARDIAC MARKERS ( 14 Oct 2018 06:07 )  x     / x     / 601 u/L / x     / x      CARDIAC MARKERS ( 13 Oct 2018 11:40 )  x     / x     / 739 u/L / x     / x          Urinalysis Basic - ( 13 Oct 2018 12:00 )    Color: LIGHT YELLOW / Appearance: CLEAR / S.017 / pH: 6.5  Gluc: NEGATIVE / Ketone: NEGATIVE  / Bili: NEGATIVE / Urobili: NORMAL   Blood: NEGATIVE / Protein: NEGATIVE / Nitrite: NEGATIVE   Leuk Esterase: NEGATIVE / RBC: x / WBC x   Sq Epi: x / Non Sq Epi: x / Bacteria: x        CAPILLARY BLOOD GLUCOSE      POCT Blood Glucose.: 148 mg/dL (14 Oct 2018 09:25)  POCT Blood Glucose.: 104 mg/dL (13 Oct 2018 21:53)  POCT Blood Glucose.: 100 mg/dL (13 Oct 2018 18:10)  POCT Blood Glucose.: 130 mg/dL (13 Oct 2018 11:08)      RADIOLOGY & ADDITIONAL TESTS: Reviewed    Imaging Personally Reviewed:    Consultant(s) Notes Reviewed:      Care Discussed with Consultants/Other Providers: Luis Minor, PGY1  Pager [phone]    Patient is a 62y old  Male who presents with a chief complaint of Leg numbness/pain (14 Oct 2018 06:42)      SUBJECTIVE/INTERVAL EVENTS: Patient seen and examined at bedside. BASIL o/n. No acute change in symptoms. Continues to have numbness, tingling, and pain of RLE, pain in LLE and lower back as well. Pain most prominent in RLE. Has intermittent headaches exacerbated by leg pain.    MEDICATIONS  (STANDING):  acyclovir IVPB      acyclovir IVPB 900 milliGRAM(s) IV Intermittent every 8 hours  amLODIPine   Tablet 2.5 milliGRAM(s) Oral daily  dextrose 5%. 1000 milliLiter(s) (50 mL/Hr) IV Continuous <Continuous>  dextrose 50% Injectable 12.5 Gram(s) IV Push once  dextrose 50% Injectable 25 Gram(s) IV Push once  dextrose 50% Injectable 25 Gram(s) IV Push once  enoxaparin Injectable 40 milliGRAM(s) SubCutaneous daily  gabapentin 300 milliGRAM(s) Oral three times a day  insulin lispro (HumaLOG) corrective regimen sliding scale   SubCutaneous three times a day before meals  insulin lispro (HumaLOG) corrective regimen sliding scale   SubCutaneous at bedtime  lidocaine   Patch 1 Patch Transdermal daily  methocarbamol 750 milliGRAM(s) Oral two times a day  nicotine -   7 mG/24Hr(s) Patch 1 patch Transdermal daily    MEDICATIONS  (PRN):  dextrose 40% Gel 15 Gram(s) Oral once PRN Blood Glucose LESS THAN 70 milliGRAM(s)/deciliter  glucagon  Injectable 1 milliGRAM(s) IntraMuscular once PRN Glucose LESS THAN 70 milligrams/deciliter  hydrALAZINE Injectable 5 milliGRAM(s) IV Push every 8 hours PRN if SBP >180  oxyCODONE    5 mG/acetaminophen 325 mG 1 Tablet(s) Oral every 6 hours PRN Severe Pain (7 - 10)      VITAL SIGNS:  T(F): 98.8 (10-14-18 @ 09:41), Max: 98.8 (10-14-18 @ 09:41)  HR: 58 (10-14-18 @ 09:41) (44 - 59)  BP: 169/98 (10-14-18 @ 09:41) (140/71 - 204/90)  RR: 17 (10-14-18 @ 09:41) (16 - 20)  SpO2: 100% (10-14-18 @ 09:41) (98% - 100%)    I&O's Summary    Daily Height in cm: 185.42 (13 Oct 2018 17:12)    Daily     PHYSICAL EXAM:  Gen: Alert, well-developed, NAD  HEENT: NCAT, PERRL, EOMI, conjunctiva clear, sclera anicteric  Neck: Supple, no JVD  CV: RRR, S1S2, no m/r/g  Resp: CTAB, normal respiratory effort  Abd: Soft, nontender, nondistended, normal bowel sounds  Ext: + peripheral pulses, no edema, clubbing, or cyanosis  Neuro: AOx3, no focal deficits, CANDELARIA but LE and back ROM pain limited  SKIN: right shin vesicular crusted appearing lesions    LABS:                        14.1   10.15 )-----------( 184      ( 14 Oct 2018 06:07 )             41.3     10-    138  |  101  |  11  ----------------------------<  129<H>  3.9   |  23  |  0.75    Ca    8.3<L>      14 Oct 2018 06:07  Phos  3.4     10-  Mg     2.1     10-    TPro  7.5  /  Alb  4.3  /  TBili  0.4  /  DBili  x   /  AST  21  /  ALT  20  /  AlkPhos  70  10-13    LIVER FUNCTIONS - ( 13 Oct 2018 11:40 )  Alb: 4.3 g/dL / Pro: 7.5 g/dL / ALK PHOS: 70 u/L / ALT: 20 u/L / AST: 21 u/L / GGT: x             CARDIAC MARKERS ( 14 Oct 2018 06:07 )  x     / x     / 601 u/L / x     / x      CARDIAC MARKERS ( 13 Oct 2018 11:40 )  x     / x     / 739 u/L / x     / x          Urinalysis Basic - ( 13 Oct 2018 12:00 )    Color: LIGHT YELLOW / Appearance: CLEAR / S.017 / pH: 6.5  Gluc: NEGATIVE / Ketone: NEGATIVE  / Bili: NEGATIVE / Urobili: NORMAL   Blood: NEGATIVE / Protein: NEGATIVE / Nitrite: NEGATIVE   Leuk Esterase: NEGATIVE / RBC: x / WBC x   Sq Epi: x / Non Sq Epi: x / Bacteria: x        CAPILLARY BLOOD GLUCOSE      POCT Blood Glucose.: 148 mg/dL (14 Oct 2018 09:25)  POCT Blood Glucose.: 104 mg/dL (13 Oct 2018 21:53)  POCT Blood Glucose.: 100 mg/dL (13 Oct 2018 18:10)  POCT Blood Glucose.: 130 mg/dL (13 Oct 2018 11:08)      RADIOLOGY & ADDITIONAL TESTS: Reviewed    Imaging Personally Reviewed:    Consultant(s) Notes Reviewed:      Care Discussed with Consultants/Other Providers: Luis Minor, PGY1  Pager [phone]    Patient is a 62y old  Male who presents with a chief complaint of Leg numbness/pain (14 Oct 2018 06:42)      SUBJECTIVE/INTERVAL EVENTS: Patient seen and examined at bedside. BASIL o/n. No acute change in symptoms. Continues to have numbness, tingling, and pain of RLE, pain in LLE and lower back as well. Pain most prominent in RLE. Has intermittent headaches exacerbated by leg pain.    MEDICATIONS  (STANDING):  acyclovir IVPB      acyclovir IVPB 900 milliGRAM(s) IV Intermittent every 8 hours  amLODIPine   Tablet 2.5 milliGRAM(s) Oral daily  dextrose 5%. 1000 milliLiter(s) (50 mL/Hr) IV Continuous <Continuous>  dextrose 50% Injectable 12.5 Gram(s) IV Push once  dextrose 50% Injectable 25 Gram(s) IV Push once  dextrose 50% Injectable 25 Gram(s) IV Push once  enoxaparin Injectable 40 milliGRAM(s) SubCutaneous daily  gabapentin 300 milliGRAM(s) Oral three times a day  insulin lispro (HumaLOG) corrective regimen sliding scale   SubCutaneous three times a day before meals  insulin lispro (HumaLOG) corrective regimen sliding scale   SubCutaneous at bedtime  lidocaine   Patch 1 Patch Transdermal daily  methocarbamol 750 milliGRAM(s) Oral two times a day  nicotine -   7 mG/24Hr(s) Patch 1 patch Transdermal daily    MEDICATIONS  (PRN):  dextrose 40% Gel 15 Gram(s) Oral once PRN Blood Glucose LESS THAN 70 milliGRAM(s)/deciliter  glucagon  Injectable 1 milliGRAM(s) IntraMuscular once PRN Glucose LESS THAN 70 milligrams/deciliter  hydrALAZINE Injectable 5 milliGRAM(s) IV Push every 8 hours PRN if SBP >180  oxyCODONE    5 mG/acetaminophen 325 mG 1 Tablet(s) Oral every 6 hours PRN Severe Pain (7 - 10)      VITAL SIGNS:  T(F): 98.8 (10-14-18 @ 09:41), Max: 98.8 (10-14-18 @ 09:41)  HR: 58 (10-14-18 @ 09:41) (44 - 59)  BP: 169/98 (10-14-18 @ 09:41) (140/71 - 204/90)  RR: 17 (10-14-18 @ 09:41) (16 - 20)  SpO2: 100% (10-14-18 @ 09:41) (98% - 100%)    I&O's Summary    Daily Height in cm: 185.42 (13 Oct 2018 17:12)    Daily     PHYSICAL EXAM:  Gen: Alert, well-developed, NAD  HEENT: NCAT, PERRL, EOMI, conjunctiva clear, sclera anicteric  Neck: Supple, no JVD  CV: RRR, S1S2, no m/r/g  Resp: CTAB, normal respiratory effort  Abd: Soft, nontender, nondistended, normal bowel sounds  Ext: + peripheral pulses, no edema, clubbing, or cyanosis  Neuro: AOx3, no focal deficits, CANDELARIA but LE and back ROM pain limited, straight leg test positive  SKIN: small punctate excoriations on right shin    LABS:                        14.1   10.15 )-----------( 184      ( 14 Oct 2018 06:07 )             41.3     10-14    138  |  101  |  11  ----------------------------<  129<H>  3.9   |  23  |  0.75    Ca    8.3<L>      14 Oct 2018 06:07  Phos  3.4     10-14  Mg     2.1     10-14    TPro  7.5  /  Alb  4.3  /  TBili  0.4  /  DBili  x   /  AST  21  /  ALT  20  /  AlkPhos  70  10-13    LIVER FUNCTIONS - ( 13 Oct 2018 11:40 )  Alb: 4.3 g/dL / Pro: 7.5 g/dL / ALK PHOS: 70 u/L / ALT: 20 u/L / AST: 21 u/L / GGT: x             CARDIAC MARKERS ( 14 Oct 2018 06:07 )  x     / x     / 601 u/L / x     / x      CARDIAC MARKERS ( 13 Oct 2018 11:40 )  x     / x     / 739 u/L / x     / x          Urinalysis Basic - ( 13 Oct 2018 12:00 )    Color: LIGHT YELLOW / Appearance: CLEAR / S.017 / pH: 6.5  Gluc: NEGATIVE / Ketone: NEGATIVE  / Bili: NEGATIVE / Urobili: NORMAL   Blood: NEGATIVE / Protein: NEGATIVE / Nitrite: NEGATIVE   Leuk Esterase: NEGATIVE / RBC: x / WBC x   Sq Epi: x / Non Sq Epi: x / Bacteria: x        CAPILLARY BLOOD GLUCOSE      POCT Blood Glucose.: 148 mg/dL (14 Oct 2018 09:25)  POCT Blood Glucose.: 104 mg/dL (13 Oct 2018 21:53)  POCT Blood Glucose.: 100 mg/dL (13 Oct 2018 18:10)  POCT Blood Glucose.: 130 mg/dL (13 Oct 2018 11:08)      RADIOLOGY & ADDITIONAL TESTS: Reviewed    Imaging Personally Reviewed:    Consultant(s) Notes Reviewed:      Care Discussed with Consultants/Other Providers:

## 2018-10-14 NOTE — PROGRESS NOTE ADULT - PROBLEM SELECTOR PLAN 1
Differential includes neuropathy, radiculopathy (patient with history of "nerve damage" according to Neurologist), no signs of cord compression, no signs of vascular compromise   - Without focal deficits on exam   - Lesion on RLE may be Varicella lesions, confined to anterior shin, will start Acyclovir   - Lidoderm patch  - will continue with mucle relaxant, methocarbomal, 750 mg BID  - will start gabapentin 100 mg TID   - PT consult   - will need to get in touch with Neurologist, Dr. Denton, to find out about recent MRI of neck/back  - less likely DVT as without calf tenderness or swelling, but will get Duplex LE Differential includes neuropathy, radiculopathy (patient with history of "nerve damage" according to Neurologist), no signs of cord compression, no signs of vascular compromise   - Without focal deficits on exam   - Lesion on RLE may be Varicella lesions, confined to anterior shin, will start Acyclovir   - Lidoderm patch  - will c/w muscle relaxant, methocarbamol, 750 mg BID  - c/w gabapentin 100 mg TID   - PT consult   - will need to get in touch with Neurologist, Dr. Denton, to find out about recent MRI of neck/back  - less likely DVT as without calf tenderness or swelling, but will get Duplex LE Differential includes neuropathy, radiculopathy (patient with history of "nerve damage" according to Neurologist), no signs of cord compression, no signs of vascular compromise   - Without focal deficits on exam   - Lesion on RLE may be Varicella lesions, confined to anterior shin, will start Acyclovir   - Lidoderm patch, percocet prn  - will c/w muscle relaxant, methocarbamol, 750 mg BID  - c/w gabapentin 100 mg TID   - PT consult   - will need to get in touch with Neurologist, Dr. Denton, to find out about recent MRI of neck/back  - less likely DVT as without calf tenderness or swelling, but will get Duplex LE Differential includes neuropathy, radiculopathy (patient with history of "nerve damage" according to Neurologist), no signs of cord compression, no signs of vascular compromise   - Without focal deficits on exam   - Lesion on RLE may be Varicella lesions, confined to anterior shin, will start Acyclovir   - Lidoderm patch, percocet prn  - will c/w muscle relaxant, methocarbamol, 750 mg BID  - c/w gabapentin 100 mg TID   - PT consult   - will need to get in touch with Neurologist, Dr. Denton, to find out about recent MRI of neck/back. Located in [place], opens 9AM on Monday.  - less likely DVT as without calf tenderness or swelling, but will get Duplex LE Differential includes neuropathy, radiculopathy (patient with history of "nerve damage" according to Neurologist), no signs of cord compression, no signs of vascular compromise   - Without focal deficits on exam   - Lesion on RLE may be Varicella lesions, confined to anterior shin, c/w Acyclovir 900 q8   - Pain control with lidoderm patch, gabapentin 300 mg TID, methocarbamol 750 mg BID, percocet q6 prn  - PT consult   - will need to get in touch with Neurologist, Dr. Denton, to find out about recent MRI of neck/back. Located in [place], opens 9AM on Monday.  - less likely DVT as without calf tenderness or swelling, but will get Duplex LE Differential includes neuropathy, radiculopathy (patient with history of "nerve damage" according to Neurologist), no signs of cord compression, no signs of vascular compromise   - Without focal deficits on exam   - Lesion on RLE may be Varicella lesions, confined to anterior shin, c/w Acyclovir 900 q8   - Pain control with lidoderm patch, gabapentin 300 mg TID, methocarbamol 750 mg BID; d/c percocet and start oxy 5 q6 for mod pain and oxy q10 for severe pain today  - will need to get in touch with Neurologist, Dr. Denton, to find out about recent MRI of neck/back. Located in [place], opens 9AM on Monday.  - less likely DVT as without calf tenderness or swelling, but will get Duplex LE Differential includes neuropathy, radiculopathy (patient with history of "nerve damage" according to Neurologist), no signs of cord compression, no signs of vascular compromise   - Without focal deficits on exam   - Lesion on RLE may be Varicella lesions, confined to anterior shin, c/w Acyclovir 900 q8  - Will obtain HSV, VZV PCR swab of right shin  - Pain control with lidoderm patch, gabapentin 300 mg TID, methocarbamol 750 mg BID; d/c percocet and start oxy 5 q6 for mod pain and oxy q10 for severe pain today  - will need to get in touch with Neurologist, Dr. Denton, to find out about recent MRI of neck/back. Located in [place], opens 9AM on Monday.  - less likely DVT as without calf tenderness or swelling, but will get Duplex LE Differential includes neuropathy, radiculopathy (patient with history of "nerve damage" according to Neurologist), no signs of cord compression, no signs of vascular compromise.  - RLE punctate excoriations, less likely vesicular rash of varicella, will dc acyclovir  - History of chronic lower back and R>L LE shooting pain, numbness, tingling. Likely  radiculopathy  - Will order lumbosacral spine XR  - Pain control with lidoderm patch, gabapentin 300 mg TID, methocarbamol 750 mg BID; d/c percocet and start oxy 5 q6 for mod pain and oxy q10 for severe pain today  - will need to get in touch with Neurologist, Dr. Denton, to find out about recent MRI of neck/back. Located in [place], opens 9AM on Monday.  - less likely DVT as without calf tenderness or swelling, but will get Duplex LE

## 2018-10-14 NOTE — PHYSICAL THERAPY INITIAL EVALUATION ADULT - LEVEL OF INDEPENDENCE: SIT/STAND, REHAB EVAL
Pt. reported exacerbation of pain in sitting requesting to return to bed. Pt. returned supine in bed with all tubes/lines intact, call bell in reach and in NAD. RN aware will assess when feasible/unable to perform

## 2018-10-14 NOTE — PROGRESS NOTE ADULT - ASSESSMENT
61 y/o male with PMHx of diabetes (no longer on medications), hearing impairment, chronic back and bilateral lower extremity pain, active smoker admitted for worsening right leg pain and numbness.

## 2018-10-14 NOTE — PHYSICAL THERAPY INITIAL EVALUATION ADULT - ADDITIONAL COMMENTS
as per pt. Pt. lives in private house with parents. Pt. has steps at home to negotiate with handrail x1. Pt. ambulating independently with a straight cane and independent with ADLs. Pt. returned supine in bed with all tubes/lines intact, call bell in reach and in NAD. CURT israel

## 2018-10-14 NOTE — PROGRESS NOTE ADULT - PROBLEM SELECTOR PLAN 7
-DVT PPx: Lovenox 40   -Diet: Carb consistent, low sodium -DVT PPx: Lovenox 40   -Diet: Carb consistent, low sodium  -PT eval: rehab

## 2018-10-14 NOTE — PROGRESS NOTE ADULT - PROBLEM SELECTOR PLAN 6
Active smoker, 40 year history of 1.5 PPD  - will start Nicotine patch Active smoker, 40 year history of 1.5 PPD  - c/w Nicotine patch

## 2018-10-14 NOTE — PHYSICAL THERAPY INITIAL EVALUATION ADULT - PERTINENT HX OF CURRENT PROBLEM, REHAB EVAL
Pt. is a 62 year old male admitted to [facility] secondary to pain of lower extremity. PMH: diabetes.

## 2018-10-14 NOTE — PHYSICAL THERAPY INITIAL EVALUATION ADULT - MANUAL MUSCLE TESTING RESULTS, REHAB EVAL
Bilateral UE muscle strength grossly 3/5 Throughout; left LE muscle strength grossly 3/5 Throughout. Right LE muscle strength not formally assessed secondary to reports of pain.

## 2018-10-14 NOTE — PROGRESS NOTE ADULT - PROBLEM SELECTOR PLAN 2
Patient with SBP up to 204 in ED, improved to 140/71 without intervention  - Likely related to pain  - continue to monitor BP, will start amlodipine 2.5 mg if SBP >150  - Hydralazine 5 mg IV if SBP >180 Patient with SBP up to 204 in ED, improved to 140/71 without intervention  - Likely related to pain  - continue to monitor BP, started amlodipine 2.5 mg yesterday for SBP >150  - Hydralazine 5 mg IV if SBP >180 Patient with SBP up to 204 in ED, improved to 140/71 without intervention  - Increase amlodipine from 2.5 to 5 today  - Likely exacerbated by pain; will adjust pain control

## 2018-10-15 LAB
BACTERIA UR CULT: SIGNIFICANT CHANGE UP
BASOPHILS # BLD AUTO: 0.05 K/UL — SIGNIFICANT CHANGE UP (ref 0–0.2)
BASOPHILS NFR BLD AUTO: 0.6 % — SIGNIFICANT CHANGE UP (ref 0–2)
BUN SERPL-MCNC: 17 MG/DL — SIGNIFICANT CHANGE UP (ref 7–23)
CALCIUM SERPL-MCNC: 9 MG/DL — SIGNIFICANT CHANGE UP (ref 8.4–10.5)
CHLORIDE SERPL-SCNC: 103 MMOL/L — SIGNIFICANT CHANGE UP (ref 98–107)
CK SERPL-CCNC: 474 U/L — HIGH (ref 30–200)
CO2 SERPL-SCNC: 23 MMOL/L — SIGNIFICANT CHANGE UP (ref 22–31)
CREAT SERPL-MCNC: 0.79 MG/DL — SIGNIFICANT CHANGE UP (ref 0.5–1.3)
EOSINOPHIL # BLD AUTO: 0.22 K/UL — SIGNIFICANT CHANGE UP (ref 0–0.5)
EOSINOPHIL NFR BLD AUTO: 2.6 % — SIGNIFICANT CHANGE UP (ref 0–6)
GLUCOSE SERPL-MCNC: 103 MG/DL — HIGH (ref 70–99)
HCT VFR BLD CALC: 41.1 % — SIGNIFICANT CHANGE UP (ref 39–50)
HGB BLD-MCNC: 14.2 G/DL — SIGNIFICANT CHANGE UP (ref 13–17)
HIV 1+2 AB+HIV1 P24 AG SERPL QL IA: SIGNIFICANT CHANGE UP
IMM GRANULOCYTES # BLD AUTO: 0.02 # — SIGNIFICANT CHANGE UP
IMM GRANULOCYTES NFR BLD AUTO: 0.2 % — SIGNIFICANT CHANGE UP (ref 0–1.5)
LYMPHOCYTES # BLD AUTO: 3.01 K/UL — SIGNIFICANT CHANGE UP (ref 1–3.3)
LYMPHOCYTES # BLD AUTO: 34.9 % — SIGNIFICANT CHANGE UP (ref 13–44)
MAGNESIUM SERPL-MCNC: 2.1 MG/DL — SIGNIFICANT CHANGE UP (ref 1.6–2.6)
MCHC RBC-ENTMCNC: 31.7 PG — SIGNIFICANT CHANGE UP (ref 27–34)
MCHC RBC-ENTMCNC: 34.5 % — SIGNIFICANT CHANGE UP (ref 32–36)
MCV RBC AUTO: 91.7 FL — SIGNIFICANT CHANGE UP (ref 80–100)
MONOCYTES # BLD AUTO: 0.94 K/UL — HIGH (ref 0–0.9)
MONOCYTES NFR BLD AUTO: 10.9 % — SIGNIFICANT CHANGE UP (ref 2–14)
NEUTROPHILS # BLD AUTO: 4.38 K/UL — SIGNIFICANT CHANGE UP (ref 1.8–7.4)
NEUTROPHILS NFR BLD AUTO: 50.8 % — SIGNIFICANT CHANGE UP (ref 43–77)
NRBC # FLD: 0 — SIGNIFICANT CHANGE UP
PHOSPHATE SERPL-MCNC: 4.1 MG/DL — SIGNIFICANT CHANGE UP (ref 2.5–4.5)
PLATELET # BLD AUTO: 176 K/UL — SIGNIFICANT CHANGE UP (ref 150–400)
PMV BLD: 12.2 FL — SIGNIFICANT CHANGE UP (ref 7–13)
POTASSIUM SERPL-MCNC: 3.7 MMOL/L — SIGNIFICANT CHANGE UP (ref 3.5–5.3)
POTASSIUM SERPL-SCNC: 3.7 MMOL/L — SIGNIFICANT CHANGE UP (ref 3.5–5.3)
RBC # BLD: 4.48 M/UL — SIGNIFICANT CHANGE UP (ref 4.2–5.8)
RBC # FLD: 11.9 % — SIGNIFICANT CHANGE UP (ref 10.3–14.5)
SODIUM SERPL-SCNC: 141 MMOL/L — SIGNIFICANT CHANGE UP (ref 135–145)
WBC # BLD: 8.62 K/UL — SIGNIFICANT CHANGE UP (ref 3.8–10.5)
WBC # FLD AUTO: 8.62 K/UL — SIGNIFICANT CHANGE UP (ref 3.8–10.5)

## 2018-10-15 PROCEDURE — 99233 SBSQ HOSP IP/OBS HIGH 50: CPT | Mod: GC

## 2018-10-15 PROCEDURE — 93971 EXTREMITY STUDY: CPT | Mod: 26,LT

## 2018-10-15 RX ADMIN — OXYCODONE HYDROCHLORIDE 10 MILLIGRAM(S): 5 TABLET ORAL at 19:17

## 2018-10-15 RX ADMIN — OXYCODONE HYDROCHLORIDE 10 MILLIGRAM(S): 5 TABLET ORAL at 10:16

## 2018-10-15 RX ADMIN — ENOXAPARIN SODIUM 40 MILLIGRAM(S): 100 INJECTION SUBCUTANEOUS at 13:38

## 2018-10-15 RX ADMIN — OXYCODONE HYDROCHLORIDE 10 MILLIGRAM(S): 5 TABLET ORAL at 09:44

## 2018-10-15 RX ADMIN — LIDOCAINE 1 PATCH: 4 CREAM TOPICAL at 13:38

## 2018-10-15 RX ADMIN — LIDOCAINE 1 PATCH: 4 CREAM TOPICAL at 13:37

## 2018-10-15 RX ADMIN — Medication 1 PATCH: at 13:31

## 2018-10-15 RX ADMIN — GABAPENTIN 300 MILLIGRAM(S): 400 CAPSULE ORAL at 22:23

## 2018-10-15 RX ADMIN — METHOCARBAMOL 750 MILLIGRAM(S): 500 TABLET, FILM COATED ORAL at 18:47

## 2018-10-15 RX ADMIN — GABAPENTIN 300 MILLIGRAM(S): 400 CAPSULE ORAL at 13:39

## 2018-10-15 RX ADMIN — AMLODIPINE BESYLATE 5 MILLIGRAM(S): 2.5 TABLET ORAL at 06:00

## 2018-10-15 RX ADMIN — OXYCODONE HYDROCHLORIDE 10 MILLIGRAM(S): 5 TABLET ORAL at 18:47

## 2018-10-15 RX ADMIN — OXYCODONE HYDROCHLORIDE 10 MILLIGRAM(S): 5 TABLET ORAL at 00:20

## 2018-10-15 RX ADMIN — GABAPENTIN 300 MILLIGRAM(S): 400 CAPSULE ORAL at 05:59

## 2018-10-15 RX ADMIN — METHOCARBAMOL 750 MILLIGRAM(S): 500 TABLET, FILM COATED ORAL at 05:59

## 2018-10-15 RX ADMIN — Medication 1 PATCH: at 13:38

## 2018-10-15 NOTE — PROGRESS NOTE ADULT - PROBLEM SELECTOR PLAN 1
Differential includes neuropathy, radiculopathy (patient with history of "nerve damage" according to Neurologist), no signs of cord compression, no signs of vascular compromise.  - RLE punctate excoriations, less likely vesicular rash of varicella, will dc acyclovir  - History of chronic lower back and R>L LE shooting pain, numbness, tingling. Likely  radiculopathy  - Will order lumbosacral spine XR  - Pain control with lidoderm patch, gabapentin 300 mg TID, methocarbamol 750 mg BID; d/c percocet and start oxy 5 q6 for mod pain and oxy q10 for severe pain today  - will need to get in touch with Neurologist, Dr. Denton, to find out about recent MRI of neck/back. Located in [place], opens 9AM on Monday.  - less likely DVT as without calf tenderness or swelling, but will get Duplex LE Differential includes neuropathy, radiculopathy (patient with history of "nerve damage" according to Neurologist), no signs of cord compression, no signs of vascular compromise.  - RLE punctate excoriations, less likely vesicular rash of varicella, D/brittany acyclovir  - History of chronic lower back and R>L LE shooting pain, numbness, tingling. Likely  radiculopathy  - lumbosacral spine XR performed. Results pending.  - Pain control with lidoderm patch, gabapentin 300 mg TID, methocarbamol 750 mg BID; d/c percocet and start oxy 5 q6 for mod pain and oxy q10 for severe pain today  - will need to get in touch with Neurologist, Dr. Denton, to find out about recent MRI of neck/back. Located in [place], opens 9AM on Monday.  - less likely DVT as without calf tenderness or swelling, but will get Duplex LE. Performed, results pending. Differential includes neuropathy, radiculopathy (patient with history of "nerve damage" according to Neurologist), no signs of cord compression, no signs of vascular compromise.  - RLE punctate excoriations, less likely vesicular rash of varicella, D/brittany acyclovir  - History of chronic lower back and R>L LE shooting pain, numbness, tingling. Likely  radiculopathy  - lumbosacral spine XR performed. Results pending.  - Pain control with lidoderm patch, gabapentin 300 mg TID, methocarbamol 750 mg BID; d/c percocet and start oxy 5 q6 for mod pain and oxy q10 for severe pain today  - will need to get in touch with Neurologist, Dr. Denton, to find out about recent MRI of neck/back. Located in [place], opens 9AM on Monday. Since they recently moved to [place], they no longer have records of the patient, including MRI. Per patient, MRI was done ~5 years ago. Contacted PCP and did not have MRI records.  - less likely DVT as without calf tenderness or swelling, doppler US negative.

## 2018-10-15 NOTE — PROGRESS NOTE ADULT - SUBJECTIVE AND OBJECTIVE BOX
Richy Martinez Ma, MD  PGY 1 Internal Medicine  Pager: [phone]    Patient is a 62y old  Male who presents with a chief complaint of Leg numbness/pain (14 Oct 2018 06:42)      SUBJECTIVE / OVERNIGHT EVENTS: NAEO. Denies fever, chills, night sweat, chest pain, SOB, or abdominal pain. Leg pain slightly better today, but still endorses tingling/numbness.          MEDICATIONS  (STANDING):  amLODIPine   Tablet 5 milliGRAM(s) Oral daily  dextrose 5%. 1000 milliLiter(s) (50 mL/Hr) IV Continuous <Continuous>  dextrose 50% Injectable 12.5 Gram(s) IV Push once  dextrose 50% Injectable 25 Gram(s) IV Push once  dextrose 50% Injectable 25 Gram(s) IV Push once  enoxaparin Injectable 40 milliGRAM(s) SubCutaneous daily  gabapentin 300 milliGRAM(s) Oral three times a day  insulin lispro (HumaLOG) corrective regimen sliding scale   SubCutaneous three times a day before meals  insulin lispro (HumaLOG) corrective regimen sliding scale   SubCutaneous at bedtime  lidocaine   Patch 1 Patch Transdermal daily  lidocaine   Patch 1 Patch Transdermal daily  methocarbamol 750 milliGRAM(s) Oral two times a day  nicotine -   7 mG/24Hr(s) Patch 1 patch Transdermal daily    MEDICATIONS  (PRN):  dextrose 40% Gel 15 Gram(s) Oral once PRN Blood Glucose LESS THAN 70 milliGRAM(s)/deciliter  glucagon  Injectable 1 milliGRAM(s) IntraMuscular once PRN Glucose LESS THAN 70 milligrams/deciliter  ibuprofen  Tablet. 600 milliGRAM(s) Oral every 6 hours PRN Moderate Pain (4 - 6)  oxyCODONE    IR 5 milliGRAM(s) Oral once PRN Moderate Pain (4 - 6)  oxyCODONE    IR 10 milliGRAM(s) Oral every 6 hours PRN Severe Pain (7 - 10)      Vital Signs Last 24 Hrs  T(C): 36.9 (15 Oct 2018 05:57), Max: 37.4 (14 Oct 2018 13:53)  T(F): 98.5 (15 Oct 2018 05:57), Max: 99.4 (14 Oct 2018 13:53)  HR: 54 (15 Oct 2018 05:57) (54 - 61)  BP: 157/95 (15 Oct 2018 05:57) (157/95 - 174/86)  BP(mean): --  RR: 17 (15 Oct 2018 05:57) (16 - 17)  SpO2: 100% (15 Oct 2018 05:57) (100% - 100%)      PHYSICAL EXAM  Gen: Alert, well-developed, NAD  HEENT: NCAT, EOMI, conjunctiva clear, sclera anicteric  Neck: Supple  CV: RRR, S1S2, no m/r/g. 2+ DP/PT pulse on right foot.   Resp: CTAB anteriorly, nonlabored respiratory effort, no wheezing or rales  Abd: Soft, NT, ND, +BS  Ext: no peripheral edema. Left foot nontender to palpation. No calf edema but patient reports tender on palpation of left calf.  Neuro: no focal deficits. Intact ROM and normal strength of right foot.   SKIN: no rash    CAPILLARY BLOOD GLUCOSE      POCT Blood Glucose.: 116 mg/dL (14 Oct 2018 22:07)  POCT Blood Glucose.: 138 mg/dL (14 Oct 2018 18:34)  POCT Blood Glucose.: 120 mg/dL (14 Oct 2018 13:27)  POCT Blood Glucose.: 148 mg/dL (14 Oct 2018 09:25)    I&O's Summary      LABS:                        14.2   8.62  )-----------( 176      ( 15 Oct 2018 05:58 )             41.1     10-15    141  |  103  |  17  ----------------------------<  103<H>  3.7   |  23  |  0.79    Ca    9.0      15 Oct 2018 05:56  Phos  4.1     10-15  Mg     2.1     10-15    TPro  7.5  /  Alb  4.3  /  TBili  0.4  /  DBili  x   /  AST  21  /  ALT  20  /  AlkPhos  70  10-13      CARDIAC MARKERS ( 15 Oct 2018 05:56 )  x     / x     / 474 u/L / x     / x      CARDIAC MARKERS ( 14 Oct 2018 06:07 )  x     / x     / 601 u/L / x     / x      CARDIAC MARKERS ( 13 Oct 2018 11:40 )  x     / x     / 739 u/L / x     / x          Urinalysis Basic - ( 13 Oct 2018 12:00 )    Color: LIGHT YELLOW / Appearance: CLEAR / S.017 / pH: 6.5  Gluc: NEGATIVE / Ketone: NEGATIVE  / Bili: NEGATIVE / Urobili: NORMAL   Blood: NEGATIVE / Protein: NEGATIVE / Nitrite: NEGATIVE   Leuk Esterase: NEGATIVE / RBC: x / WBC x   Sq Epi: x / Non Sq Epi: x / Bacteria: x        RADIOLOGY & ADDITIONAL TESTS:     MICROBIOLOGY:    ANTIMICROBIALS:    CONSULTS: Richy Martinez Ma, MD  PGY 1 Internal Medicine  Pager: [phone]    Patient is a 62y old  Male who presents with a chief complaint of Leg numbness/pain (14 Oct 2018 06:42)      SUBJECTIVE / OVERNIGHT EVENTS: NAEO. Denies fever, chills, night sweat, chest pain, SOB, or abdominal pain. Leg pain slightly better today, but still endorses tingling/numbness.          MEDICATIONS  (STANDING):  amLODIPine   Tablet 5 milliGRAM(s) Oral daily  dextrose 5%. 1000 milliLiter(s) (50 mL/Hr) IV Continuous <Continuous>  dextrose 50% Injectable 12.5 Gram(s) IV Push once  dextrose 50% Injectable 25 Gram(s) IV Push once  dextrose 50% Injectable 25 Gram(s) IV Push once  enoxaparin Injectable 40 milliGRAM(s) SubCutaneous daily  gabapentin 300 milliGRAM(s) Oral three times a day  insulin lispro (HumaLOG) corrective regimen sliding scale   SubCutaneous three times a day before meals  insulin lispro (HumaLOG) corrective regimen sliding scale   SubCutaneous at bedtime  lidocaine   Patch 1 Patch Transdermal daily  lidocaine   Patch 1 Patch Transdermal daily  methocarbamol 750 milliGRAM(s) Oral two times a day  nicotine -   7 mG/24Hr(s) Patch 1 patch Transdermal daily    MEDICATIONS  (PRN):  dextrose 40% Gel 15 Gram(s) Oral once PRN Blood Glucose LESS THAN 70 milliGRAM(s)/deciliter  glucagon  Injectable 1 milliGRAM(s) IntraMuscular once PRN Glucose LESS THAN 70 milligrams/deciliter  ibuprofen  Tablet. 600 milliGRAM(s) Oral every 6 hours PRN Moderate Pain (4 - 6)  oxyCODONE    IR 5 milliGRAM(s) Oral once PRN Moderate Pain (4 - 6)  oxyCODONE    IR 10 milliGRAM(s) Oral every 6 hours PRN Severe Pain (7 - 10)      Vital Signs Last 24 Hrs  T(C): 36.9 (15 Oct 2018 05:57), Max: 37.4 (14 Oct 2018 13:53)  T(F): 98.5 (15 Oct 2018 05:57), Max: 99.4 (14 Oct 2018 13:53)  HR: 54 (15 Oct 2018 05:57) (54 - 61)  BP: 157/95 (15 Oct 2018 05:57) (157/95 - 174/86)  BP(mean): --  RR: 17 (15 Oct 2018 05:57) (16 - 17)  SpO2: 100% (15 Oct 2018 05:57) (100% - 100%)      PHYSICAL EXAM  Gen: Alert, well-developed, NAD  HEENT: NCAT, EOMI, conjunctiva clear, sclera anicteric  Neck: Supple  CV: RRR, S1S2, no m/r/g. 2+ DP/PT pulse on right foot.   Resp: CTAB anteriorly, nonlabored respiratory effort, no wheezing or rales  Abd: Soft, NT, ND, +BS  Ext: no peripheral edema. Left foot nontender to palpation. No calf edema but patient reports tender on palpation of left calf.  Neuro: no focal deficits. Intact ROM and normal strength of right foot.   SKIN: no rash. Small punctate excoriations on right shin    CAPILLARY BLOOD GLUCOSE      POCT Blood Glucose.: 116 mg/dL (14 Oct 2018 22:07)  POCT Blood Glucose.: 138 mg/dL (14 Oct 2018 18:34)  POCT Blood Glucose.: 120 mg/dL (14 Oct 2018 13:27)  POCT Blood Glucose.: 148 mg/dL (14 Oct 2018 09:25)    I&O's Summary      LABS:                        14.2   8.62  )-----------( 176      ( 15 Oct 2018 05:58 )             41.1     10-15    141  |  103  |  17  ----------------------------<  103<H>  3.7   |  23  |  0.79    Ca    9.0      15 Oct 2018 05:56  Phos  4.1     10-15  Mg     2.1     10-15    TPro  7.5  /  Alb  4.3  /  TBili  0.4  /  DBili  x   /  AST  21  /  ALT  20  /  AlkPhos  70  10-13      CARDIAC MARKERS ( 15 Oct 2018 05:56 )  x     / x     / 474 u/L / x     / x      CARDIAC MARKERS ( 14 Oct 2018 06:07 )  x     / x     / 601 u/L / x     / x      CARDIAC MARKERS ( 13 Oct 2018 11:40 )  x     / x     / 739 u/L / x     / x          Urinalysis Basic - ( 13 Oct 2018 12:00 )    Color: LIGHT YELLOW / Appearance: CLEAR / S.017 / pH: 6.5  Gluc: NEGATIVE / Ketone: NEGATIVE  / Bili: NEGATIVE / Urobili: NORMAL   Blood: NEGATIVE / Protein: NEGATIVE / Nitrite: NEGATIVE   Leuk Esterase: NEGATIVE / RBC: x / WBC x   Sq Epi: x / Non Sq Epi: x / Bacteria: x        RADIOLOGY & ADDITIONAL TESTS:     MICROBIOLOGY:    ANTIMICROBIALS:    CONSULTS: Richy Martinez Ma, MD  PGY 1 Internal Medicine  Pager: [phone]    Patient is a 62y old  Male who presents with a chief complaint of Leg numbness/pain (14 Oct 2018 06:42)      SUBJECTIVE / OVERNIGHT EVENTS: NAEO. Denies fever, chills, night sweat, chest pain, SOB, or abdominal pain. Leg pain slightly better today, but still endorses tingling/numbness.          MEDICATIONS  (STANDING):  amLODIPine   Tablet 5 milliGRAM(s) Oral daily  dextrose 5%. 1000 milliLiter(s) (50 mL/Hr) IV Continuous <Continuous>  dextrose 50% Injectable 12.5 Gram(s) IV Push once  dextrose 50% Injectable 25 Gram(s) IV Push once  dextrose 50% Injectable 25 Gram(s) IV Push once  enoxaparin Injectable 40 milliGRAM(s) SubCutaneous daily  gabapentin 300 milliGRAM(s) Oral three times a day  insulin lispro (HumaLOG) corrective regimen sliding scale   SubCutaneous three times a day before meals  insulin lispro (HumaLOG) corrective regimen sliding scale   SubCutaneous at bedtime  lidocaine   Patch 1 Patch Transdermal daily  lidocaine   Patch 1 Patch Transdermal daily  methocarbamol 750 milliGRAM(s) Oral two times a day  nicotine -   7 mG/24Hr(s) Patch 1 patch Transdermal daily    MEDICATIONS  (PRN):  dextrose 40% Gel 15 Gram(s) Oral once PRN Blood Glucose LESS THAN 70 milliGRAM(s)/deciliter  glucagon  Injectable 1 milliGRAM(s) IntraMuscular once PRN Glucose LESS THAN 70 milligrams/deciliter  ibuprofen  Tablet. 600 milliGRAM(s) Oral every 6 hours PRN Moderate Pain (4 - 6)  oxyCODONE    IR 5 milliGRAM(s) Oral once PRN Moderate Pain (4 - 6)  oxyCODONE    IR 10 milliGRAM(s) Oral every 6 hours PRN Severe Pain (7 - 10)      Vital Signs Last 24 Hrs  T(C): 36.9 (15 Oct 2018 05:57), Max: 37.4 (14 Oct 2018 13:53)  T(F): 98.5 (15 Oct 2018 05:57), Max: 99.4 (14 Oct 2018 13:53)  HR: 54 (15 Oct 2018 05:57) (54 - 61)  BP: 157/95 (15 Oct 2018 05:57) (157/95 - 174/86)  BP(mean): --  RR: 17 (15 Oct 2018 05:57) (16 - 17)  SpO2: 100% (15 Oct 2018 05:57) (100% - 100%)      PHYSICAL EXAM  Gen: Alert, well-developed, NAD  HEENT: NCAT, EOMI, conjunctiva clear, sclera anicteric  Neck: Supple  CV: RRR, S1S2, no m/r/g. 2+ DP/PT pulse on right foot.   Resp: CTAB anteriorly, nonlabored respiratory effort, no wheezing or rales  Abd: Soft, NT, ND, +BS  Ext: no peripheral edema. Left foot nontender to palpation. No calf edema but patient reports tender on palpation of left calf.  Neuro: no focal deficits. Intact ROM and normal strength of right foot.   SKIN: no rash. Small punctate excoriations on right shin, but appears chronic, not acute. Also several maculopapular lesions on penile shaft, painless, no erythema or obvious ulcer, no penile discharge    CAPILLARY BLOOD GLUCOSE      POCT Blood Glucose.: 116 mg/dL (14 Oct 2018 22:07)  POCT Blood Glucose.: 138 mg/dL (14 Oct 2018 18:34)  POCT Blood Glucose.: 120 mg/dL (14 Oct 2018 13:27)  POCT Blood Glucose.: 148 mg/dL (14 Oct 2018 09:25)    I&O's Summary      LABS:                        14.2   8.62  )-----------( 176      ( 15 Oct 2018 05:58 )             41.1     10-15    141  |  103  |  17  ----------------------------<  103<H>  3.7   |  23  |  0.79    Ca    9.0      15 Oct 2018 05:56  Phos  4.1     10-15  Mg     2.1     10-15    TPro  7.5  /  Alb  4.3  /  TBili  0.4  /  DBili  x   /  AST  21  /  ALT  20  /  AlkPhos  70  10-13      CARDIAC MARKERS ( 15 Oct 2018 05:56 )  x     / x     / 474 u/L / x     / x      CARDIAC MARKERS ( 14 Oct 2018 06:07 )  x     / x     / 601 u/L / x     / x      CARDIAC MARKERS ( 13 Oct 2018 11:40 )  x     / x     / 739 u/L / x     / x          Urinalysis Basic - ( 13 Oct 2018 12:00 )    Color: LIGHT YELLOW / Appearance: CLEAR / S.017 / pH: 6.5  Gluc: NEGATIVE / Ketone: NEGATIVE  / Bili: NEGATIVE / Urobili: NORMAL   Blood: NEGATIVE / Protein: NEGATIVE / Nitrite: NEGATIVE   Leuk Esterase: NEGATIVE / RBC: x / WBC x   Sq Epi: x / Non Sq Epi: x / Bacteria: x        RADIOLOGY & ADDITIONAL TESTS:     MICROBIOLOGY:    ANTIMICROBIALS:    CONSULTS:

## 2018-10-15 NOTE — PROGRESS NOTE ADULT - PROBLEM SELECTOR PLAN 4
Patient presenting with bradycardia HR 45-50  - EKG sinus bradycardia  - asymptomatic  - will continue to monitor Patient presenting with bradycardia HR 45-50  - EKG sinus bradycardia  - asymptomatic, currently HR 60s  - will continue to monitor

## 2018-10-15 NOTE — PROGRESS NOTE ADULT - PROBLEM SELECTOR PLAN 2
Patient with SBP up to 204 in ED, improved to 140/71 without intervention  - Increase amlodipine from 2.5 to 5 today  - Likely exacerbated by pain; will adjust pain control

## 2018-10-16 DIAGNOSIS — R73.03 PREDIABETES: ICD-10-CM

## 2018-10-16 DIAGNOSIS — N48.89 OTHER SPECIFIED DISORDERS OF PENIS: ICD-10-CM

## 2018-10-16 LAB
BUN SERPL-MCNC: 15 MG/DL — SIGNIFICANT CHANGE UP (ref 7–23)
CALCIUM SERPL-MCNC: 8.9 MG/DL — SIGNIFICANT CHANGE UP (ref 8.4–10.5)
CHLORIDE SERPL-SCNC: 102 MMOL/L — SIGNIFICANT CHANGE UP (ref 98–107)
CK SERPL-CCNC: 414 U/L — HIGH (ref 30–200)
CO2 SERPL-SCNC: 24 MMOL/L — SIGNIFICANT CHANGE UP (ref 22–31)
CREAT SERPL-MCNC: 0.77 MG/DL — SIGNIFICANT CHANGE UP (ref 0.5–1.3)
GLUCOSE SERPL-MCNC: 108 MG/DL — HIGH (ref 70–99)
HCT VFR BLD CALC: 43.7 % — SIGNIFICANT CHANGE UP (ref 39–50)
HGB BLD-MCNC: 15.1 G/DL — SIGNIFICANT CHANGE UP (ref 13–17)
MAGNESIUM SERPL-MCNC: 2.1 MG/DL — SIGNIFICANT CHANGE UP (ref 1.6–2.6)
MCHC RBC-ENTMCNC: 31.7 PG — SIGNIFICANT CHANGE UP (ref 27–34)
MCHC RBC-ENTMCNC: 34.6 % — SIGNIFICANT CHANGE UP (ref 32–36)
MCV RBC AUTO: 91.6 FL — SIGNIFICANT CHANGE UP (ref 80–100)
NRBC # FLD: 0 — SIGNIFICANT CHANGE UP
PHOSPHATE SERPL-MCNC: 4.2 MG/DL — SIGNIFICANT CHANGE UP (ref 2.5–4.5)
PLATELET # BLD AUTO: 191 K/UL — SIGNIFICANT CHANGE UP (ref 150–400)
PMV BLD: 12.3 FL — SIGNIFICANT CHANGE UP (ref 7–13)
POTASSIUM SERPL-MCNC: 3.6 MMOL/L — SIGNIFICANT CHANGE UP (ref 3.5–5.3)
POTASSIUM SERPL-SCNC: 3.6 MMOL/L — SIGNIFICANT CHANGE UP (ref 3.5–5.3)
RBC # BLD: 4.77 M/UL — SIGNIFICANT CHANGE UP (ref 4.2–5.8)
RBC # FLD: 11.8 % — SIGNIFICANT CHANGE UP (ref 10.3–14.5)
SODIUM SERPL-SCNC: 140 MMOL/L — SIGNIFICANT CHANGE UP (ref 135–145)
T PALLIDUM AB TITR SER: NEGATIVE — SIGNIFICANT CHANGE UP
WBC # BLD: 8.03 K/UL — SIGNIFICANT CHANGE UP (ref 3.8–10.5)
WBC # FLD AUTO: 8.03 K/UL — SIGNIFICANT CHANGE UP (ref 3.8–10.5)

## 2018-10-16 PROCEDURE — 71046 X-RAY EXAM CHEST 2 VIEWS: CPT | Mod: 26

## 2018-10-16 PROCEDURE — 99233 SBSQ HOSP IP/OBS HIGH 50: CPT | Mod: GC

## 2018-10-16 RX ORDER — AMLODIPINE BESYLATE 2.5 MG/1
10 TABLET ORAL DAILY
Qty: 0 | Refills: 0 | Status: DISCONTINUED | OUTPATIENT
Start: 2018-10-17 | End: 2018-10-18

## 2018-10-16 RX ORDER — DOCUSATE SODIUM 100 MG
100 CAPSULE ORAL THREE TIMES A DAY
Qty: 0 | Refills: 0 | Status: DISCONTINUED | OUTPATIENT
Start: 2018-10-16 | End: 2018-10-18

## 2018-10-16 RX ORDER — SENNA PLUS 8.6 MG/1
2 TABLET ORAL AT BEDTIME
Qty: 0 | Refills: 0 | Status: DISCONTINUED | OUTPATIENT
Start: 2018-10-16 | End: 2018-10-18

## 2018-10-16 RX ORDER — POLYETHYLENE GLYCOL 3350 17 G/17G
17 POWDER, FOR SOLUTION ORAL DAILY
Qty: 0 | Refills: 0 | Status: DISCONTINUED | OUTPATIENT
Start: 2018-10-16 | End: 2018-10-18

## 2018-10-16 RX ORDER — AMLODIPINE BESYLATE 2.5 MG/1
5 TABLET ORAL ONCE
Qty: 0 | Refills: 0 | Status: COMPLETED | OUTPATIENT
Start: 2018-10-16 | End: 2018-10-16

## 2018-10-16 RX ADMIN — OXYCODONE HYDROCHLORIDE 10 MILLIGRAM(S): 5 TABLET ORAL at 21:54

## 2018-10-16 RX ADMIN — GABAPENTIN 300 MILLIGRAM(S): 400 CAPSULE ORAL at 06:39

## 2018-10-16 RX ADMIN — OXYCODONE HYDROCHLORIDE 10 MILLIGRAM(S): 5 TABLET ORAL at 13:15

## 2018-10-16 RX ADMIN — METHOCARBAMOL 750 MILLIGRAM(S): 500 TABLET, FILM COATED ORAL at 06:39

## 2018-10-16 RX ADMIN — Medication 600 MILLIGRAM(S): at 10:02

## 2018-10-16 RX ADMIN — LIDOCAINE 1 PATCH: 4 CREAM TOPICAL at 12:46

## 2018-10-16 RX ADMIN — OXYCODONE HYDROCHLORIDE 10 MILLIGRAM(S): 5 TABLET ORAL at 22:50

## 2018-10-16 RX ADMIN — AMLODIPINE BESYLATE 5 MILLIGRAM(S): 2.5 TABLET ORAL at 06:39

## 2018-10-16 RX ADMIN — Medication 100 MILLIGRAM(S): at 14:32

## 2018-10-16 RX ADMIN — OXYCODONE HYDROCHLORIDE 10 MILLIGRAM(S): 5 TABLET ORAL at 07:30

## 2018-10-16 RX ADMIN — LIDOCAINE 1 PATCH: 4 CREAM TOPICAL at 01:00

## 2018-10-16 RX ADMIN — OXYCODONE HYDROCHLORIDE 10 MILLIGRAM(S): 5 TABLET ORAL at 12:45

## 2018-10-16 RX ADMIN — Medication 600 MILLIGRAM(S): at 10:32

## 2018-10-16 RX ADMIN — OXYCODONE HYDROCHLORIDE 10 MILLIGRAM(S): 5 TABLET ORAL at 06:39

## 2018-10-16 RX ADMIN — ENOXAPARIN SODIUM 40 MILLIGRAM(S): 100 INJECTION SUBCUTANEOUS at 12:46

## 2018-10-16 RX ADMIN — POLYETHYLENE GLYCOL 3350 17 GRAM(S): 17 POWDER, FOR SOLUTION ORAL at 12:45

## 2018-10-16 RX ADMIN — OXYCODONE HYDROCHLORIDE 10 MILLIGRAM(S): 5 TABLET ORAL at 01:42

## 2018-10-16 RX ADMIN — GABAPENTIN 300 MILLIGRAM(S): 400 CAPSULE ORAL at 21:54

## 2018-10-16 RX ADMIN — OXYCODONE HYDROCHLORIDE 10 MILLIGRAM(S): 5 TABLET ORAL at 00:50

## 2018-10-16 RX ADMIN — Medication 1 PATCH: at 12:47

## 2018-10-16 RX ADMIN — METHOCARBAMOL 750 MILLIGRAM(S): 500 TABLET, FILM COATED ORAL at 18:23

## 2018-10-16 RX ADMIN — Medication 1 PATCH: at 12:28

## 2018-10-16 RX ADMIN — GABAPENTIN 300 MILLIGRAM(S): 400 CAPSULE ORAL at 14:29

## 2018-10-16 RX ADMIN — AMLODIPINE BESYLATE 5 MILLIGRAM(S): 2.5 TABLET ORAL at 12:45

## 2018-10-16 NOTE — PROGRESS NOTE ADULT - PROBLEM SELECTOR PLAN 5
Patient with history if hearing impairment  - Hearing aid left ear Patient presenting with bradycardia HR 45-50  - EKG sinus bradycardia  - asymptomatic, currently HR 60s  - will continue to monitor

## 2018-10-16 NOTE — PROGRESS NOTE ADULT - PROBLEM SELECTOR PLAN 1
Differential includes neuropathy, radiculopathy (patient with history of "nerve damage" according to Neurologist), no signs of cord compression, no signs of vascular compromise.  - RLE punctate excoriations, less likely vesicular rash of varicella, D/brittany acyclovir  - History of chronic lower back and R>L LE shooting pain, numbness, tingling. Likely  radiculopathy  - lumbosacral spine XR performed. Results pending.  - Pain control with lidoderm patch, gabapentin 300 mg TID, methocarbamol 750 mg BID, and oxycodone 10 prn for breakthrough pain  - Outpatient neurologist, Dr. Denton, recently moved to [place] and no longer have records of the patient. PCP, .     Since they recently moved to [place], they no longer have records of the patient, including MRI. Per patient, MRI was done ~5 years ago. Contacted PCP and did not have MRI records.  - less likely DVT as without calf tenderness or swelling, doppler US negative. Differential includes neuropathy, radiculopathy (patient with history of "nerve damage" according to Neurologist), no signs of cord compression, no signs of vascular compromise.  - RLE punctate excoriations, less likely vesicular rash of varicella, D/brittany acyclovir  - History of chronic lower back and R>L LE shooting pain, numbness, tingling. Likely  radiculopathy  - lumbosacral spine XR performed. Results pending.  - Pain control with lidoderm patch, gabapentin 300 mg TID, methocarbamol 750 mg BID, and oxycodone 10 prn for breakthrough pain  - Outpatient neurologist, Dr. Denton, recently moved to [place] office and no longer have records of the patient. PCP, Dr. Kyle King, also does not have MRI records.  - Doppler US negative for DVT  - May consider MRI of lumbar spine for further eval Differential includes neuropathy, radiculopathy (patient with history of "nerve damage" according to Neurologist), no signs of cord compression, no signs of vascular compromise.  - RLE punctate excoriations, less likely vesicular rash of varicella, D/brittany acyclovir  - History of chronic lower back and R>L LE shooting pain, numbness, tingling. Likely  radiculopathy  - lumbosacral spine XR performed. Results pending.  - Pain control with lidoderm patch, gabapentin 300 mg TID, methocarbamol 750 mg BID, and oxycodone 10 prn for breakthrough pain  - Outpatient neurologist, Dr. Denton, recently moved to [place] office and no longer have records of the patient. PCP, Dr. Kyle King, also does not have MRI records. Patient reports MRI was done ~5 years ago.  - Doppler US negative for DVT  - CK level trending down from 739 at presentation to 414 currently  - May consider MRI of lumbar spine for further eval, given recent weight loss, pain worse at night, and worsening right leg pain/numbness Differential includes neuropathy, radiculopathy (patient with history of "nerve damage" according to Neurologist), no signs of cord compression, no signs of vascular compromise.  - RLE punctate excoriations, less likely vesicular rash of varicella, D/brittany acyclovir  - History of chronic lower back and R>L LE shooting pain, numbness, tingling. Likely  radiculopathy  - lumbosacral spine XR was negative for fracture or lytic lesion  - Pain control with lidoderm patch, gabapentin 300 mg TID, methocarbamol 750 mg BID, and oxycodone 10 prn for breakthrough pain  - Outpatient neurologist, Dr. Denton, recently moved to [place] office and no longer have records of the patient. PCP, Dr. Kyle King, also does not have MRI records. Patient reports MRI was done ~5 years ago.  - Doppler US negative for DVT  - CK level trending down from 739 at presentation to 414 currently  - May consider MRI of lumbar spine for further eval, given recent weight loss, pain worse at night, and worsening right leg pain/numbness

## 2018-10-16 NOTE — PROGRESS NOTE ADULT - PROBLEM SELECTOR PLAN 6
Active smoker, 40 year history of 1.5 PPD  - c/w Nicotine patch Patient with history if hearing impairment  - Hearing aid left ear

## 2018-10-16 NOTE — PROGRESS NOTE ADULT - PROBLEM SELECTOR PLAN 4
Patient presenting with bradycardia HR 45-50  - EKG sinus bradycardia  - asymptomatic, currently HR 60s  - will continue to monitor A1C 6.0, not on any medications at home   -Carb consistent diet

## 2018-10-16 NOTE — PROGRESS NOTE ADULT - PROBLEM SELECTOR PLAN 7
-DVT PPx: Lovenox 40   -Diet: Carb consistent, low sodium  -PT eval: rehab -DVT PPx: Lovenox 40   -Diet: Carb consistent, low sodium  -PT eval: Rehab Active smoker, 40 year history of 1.5 PPD  - c/w Nicotine patch

## 2018-10-16 NOTE — PROGRESS NOTE ADULT - PROBLEM SELECTOR PLAN 2
Patient with SBP up to 204 in ED, improved to 140/71 without intervention  - Increase amlodipine from 2.5 to 5 today  - Likely exacerbated by pain; will adjust pain control Patient with SBP up to 204 in ED, improved to 140/71 without intervention  - Increase amlodipine from 2.5 to 5 today  - Likely exacerbated by pain; will adjust pain control  - Will continue to monitor Patient with SBP up to 204 in ED, improved to 140/71 without intervention  - Increased amlodipine from 2.5 -> 5 -> currently 10.   - Likely exacerbated by pain; will adjust pain control  - Will continue to monitor

## 2018-10-16 NOTE — PROGRESS NOTE ADULT - ASSESSMENT
61 y/o male with PMHx of diabetes (no longer on medications), hearing impairment, chronic back and bilateral lower extremity pain, active smoker admitted for worsening right leg pain and numbness. 61 y/o male with PMHx of diabetes (no longer on medications), hearing impairment, chronic back and bilateral lower extremity pain, active smoker admitted for worsening right leg pain and numbness. MRI was ordered, likely to be done tomorrow per MRI staff.

## 2018-10-16 NOTE — PROGRESS NOTE ADULT - PROBLEM SELECTOR PLAN 3
A1C 6.0, not on any medications at home   -will start start ISS and monitor FSG’s  -Carb consistent diet A1C 6.0, not on any medications at home   -Carb consistent diet Several small painless papules on the shaft of penis near glans. No erythema, ulcer, penile discharge, bleeding, or dysuria.  - Hx of gonorrhea in the past  - HIV negative  - GC screen and syphilis work up pending  - Will continue to monitor

## 2018-10-16 NOTE — PROGRESS NOTE ADULT - SUBJECTIVE AND OBJECTIVE BOX
Richy Martinez Ma, MD  PGY 1 Internal Medicine  Pager: [phone]    Patient is a 62y old  Male who presents with a chief complaint of Leg numbness/pain (15 Oct 2018 08:24)      SUBJECTIVE / OVERNIGHT EVENTS: NAEO. Right leg stiffness, numbness, and pain did not change, per patient. Denies fever, chills, night sweat, chest pain, abdominal pain, SOB, dysuria, or penile discharge.     When asked further questions, he reports he lost 10 lbs since a month ago. Occasionally his back/leg pain feel worse at night. No diagnosis of cancer in the past. He feels leg pain worse on exertion or standing for a long time. Denies urinary/bowel incontinence.       MEDICATIONS  (STANDING):  amLODIPine   Tablet 5 milliGRAM(s) Oral daily  dextrose 5%. 1000 milliLiter(s) (50 mL/Hr) IV Continuous <Continuous>  dextrose 50% Injectable 12.5 Gram(s) IV Push once  dextrose 50% Injectable 25 Gram(s) IV Push once  dextrose 50% Injectable 25 Gram(s) IV Push once  enoxaparin Injectable 40 milliGRAM(s) SubCutaneous daily  gabapentin 300 milliGRAM(s) Oral three times a day  lidocaine   Patch 1 Patch Transdermal daily  lidocaine   Patch 1 Patch Transdermal daily  methocarbamol 750 milliGRAM(s) Oral two times a day  nicotine -   7 mG/24Hr(s) Patch 1 patch Transdermal daily    MEDICATIONS  (PRN):  dextrose 40% Gel 15 Gram(s) Oral once PRN Blood Glucose LESS THAN 70 milliGRAM(s)/deciliter  glucagon  Injectable 1 milliGRAM(s) IntraMuscular once PRN Glucose LESS THAN 70 milligrams/deciliter  ibuprofen  Tablet. 600 milliGRAM(s) Oral every 6 hours PRN Moderate Pain (4 - 6)  oxyCODONE    IR 5 milliGRAM(s) Oral once PRN Moderate Pain (4 - 6)  oxyCODONE    IR 10 milliGRAM(s) Oral every 6 hours PRN Severe Pain (7 - 10)      Vital Signs Last 24 Hrs  T(C): 36.9 (16 Oct 2018 06:37), Max: 37.2 (15 Oct 2018 13:49)  T(F): 98.4 (16 Oct 2018 06:37), Max: 98.9 (15 Oct 2018 13:49)  HR: 60 (16 Oct 2018 06:37) (51 - 60)  BP: 176/74 (16 Oct 2018 06:37) (163/86 - 177/89)  BP(mean): --  RR: 18 (16 Oct 2018 06:37) (17 - 19)  SpO2: 100% (16 Oct 2018 06:37) (98% - 100%)      PHYSICAL EXAM  Gen: Alert, well-developed, NAD  HEENT: NCAT, EOMI, conjunctiva clear, sclera anicteric  Neck: Supple  CV: RRR, S1S2, no murmurs. 2+ DP/PT pulse on right foot.   Resp: CTAB anteriorly, nonlabored respiratory effort, no wheezing or rales  Abd: Soft, NT, ND, +BS  Ext: no peripheral edema. Intact ROM left leg. Right leg ROM is mildly limited compared to left leg, likely 2/2 pain/stiffness. Can wiggle right toes.  Neuro: no focal deficits  SKIN: no rash. Small punctate excoriations on right shin, but appears chronic and stable, not acute. Also several papular lesions on penile shaft near glans, painless, no erythema or obvious ulcer, no penile discharge, appear stable    CAPILLARY BLOOD GLUCOSE      POCT Blood Glucose.: 91 mg/dL (15 Oct 2018 13:19)  POCT Blood Glucose.: 102 mg/dL (15 Oct 2018 09:22)    I&O's Summary    16 Oct 2018 07:01  -  16 Oct 2018 08:18  --------------------------------------------------------  IN: 0 mL / OUT: 600 mL / NET: -600 mL        LABS:                        15.1   8.03  )-----------( 191      ( 16 Oct 2018 06:25 )             43.7     10-16    140  |  102  |  15  ----------------------------<  108<H>  3.6   |  24  |  0.77    Ca    8.9      16 Oct 2018 06:25  Phos  4.2     10-16  Mg     2.1     10-16        CARDIAC MARKERS ( 16 Oct 2018 06:25 )  x     / x     / 414 u/L / x     / x      CARDIAC MARKERS ( 15 Oct 2018 05:56 )  x     / x     / 474 u/L / x     / x              RADIOLOGY & ADDITIONAL TESTS:     MICROBIOLOGY:    ANTIMICROBIALS:    CONSULTS: Richy Martinez Ma, MD  PGY 1 Internal Medicine  Pager: [phone]    Patient is a 62y old  Male who presents with a chief complaint of Leg numbness/pain (15 Oct 2018 08:24)      SUBJECTIVE / OVERNIGHT EVENTS: NAEO. Right leg stiffness, numbness, and pain did not change, per patient. Denies fever, chills, night sweat, chest pain, abdominal pain, SOB, dysuria, or penile discharge. Last BM yesterday.    When asked further questions, he reports he lost 10 lbs since a month ago. Occasionally his back/leg pain feel worse at night. No diagnosis of cancer in the past. He feels leg pain worse on exertion or standing for a long time. Denies urinary/bowel incontinence.       MEDICATIONS  (STANDING):  amLODIPine   Tablet 5 milliGRAM(s) Oral daily  dextrose 5%. 1000 milliLiter(s) (50 mL/Hr) IV Continuous <Continuous>  dextrose 50% Injectable 12.5 Gram(s) IV Push once  dextrose 50% Injectable 25 Gram(s) IV Push once  dextrose 50% Injectable 25 Gram(s) IV Push once  enoxaparin Injectable 40 milliGRAM(s) SubCutaneous daily  gabapentin 300 milliGRAM(s) Oral three times a day  lidocaine   Patch 1 Patch Transdermal daily  lidocaine   Patch 1 Patch Transdermal daily  methocarbamol 750 milliGRAM(s) Oral two times a day  nicotine -   7 mG/24Hr(s) Patch 1 patch Transdermal daily    MEDICATIONS  (PRN):  dextrose 40% Gel 15 Gram(s) Oral once PRN Blood Glucose LESS THAN 70 milliGRAM(s)/deciliter  glucagon  Injectable 1 milliGRAM(s) IntraMuscular once PRN Glucose LESS THAN 70 milligrams/deciliter  ibuprofen  Tablet. 600 milliGRAM(s) Oral every 6 hours PRN Moderate Pain (4 - 6)  oxyCODONE    IR 5 milliGRAM(s) Oral once PRN Moderate Pain (4 - 6)  oxyCODONE    IR 10 milliGRAM(s) Oral every 6 hours PRN Severe Pain (7 - 10)      Vital Signs Last 24 Hrs  T(C): 36.9 (16 Oct 2018 06:37), Max: 37.2 (15 Oct 2018 13:49)  T(F): 98.4 (16 Oct 2018 06:37), Max: 98.9 (15 Oct 2018 13:49)  HR: 60 (16 Oct 2018 06:37) (51 - 60)  BP: 176/74 (16 Oct 2018 06:37) (163/86 - 177/89)  BP(mean): --  RR: 18 (16 Oct 2018 06:37) (17 - 19)  SpO2: 100% (16 Oct 2018 06:37) (98% - 100%)      PHYSICAL EXAM  Gen: Alert, well-developed, NAD  HEENT: NCAT, EOMI, conjunctiva clear, sclera anicteric  Neck: Supple  CV: RRR, S1S2, no murmurs. 2+ DP/PT pulse on right foot.   Resp: CTAB anteriorly, nonlabored respiratory effort, no wheezing or rales  Abd: Soft, NT, ND, +BS  Ext: no peripheral edema. Intact ROM left leg. Right leg ROM is mildly limited compared to left leg, likely 2/2 pain/stiffness. Can wiggle right toes.  Neuro: no focal deficits  SKIN: no rash. Small punctate excoriations on right shin, but appears chronic and stable, not acute. Also several papular lesions on penile shaft near glans, painless, no erythema or obvious ulcer, no penile discharge, appear stable    CAPILLARY BLOOD GLUCOSE      POCT Blood Glucose.: 91 mg/dL (15 Oct 2018 13:19)  POCT Blood Glucose.: 102 mg/dL (15 Oct 2018 09:22)    I&O's Summary    16 Oct 2018 07:01  -  16 Oct 2018 08:18  --------------------------------------------------------  IN: 0 mL / OUT: 600 mL / NET: -600 mL        LABS:                        15.1   8.03  )-----------( 191      ( 16 Oct 2018 06:25 )             43.7     10-16    140  |  102  |  15  ----------------------------<  108<H>  3.6   |  24  |  0.77    Ca    8.9      16 Oct 2018 06:25  Phos  4.2     10-16  Mg     2.1     10-16        CARDIAC MARKERS ( 16 Oct 2018 06:25 )  x     / x     / 414 u/L / x     / x      CARDIAC MARKERS ( 15 Oct 2018 05:56 )  x     / x     / 474 u/L / x     / x              RADIOLOGY & ADDITIONAL TESTS:     MICROBIOLOGY:    ANTIMICROBIALS:    CONSULTS:

## 2018-10-17 LAB
BUN SERPL-MCNC: 18 MG/DL — SIGNIFICANT CHANGE UP (ref 7–23)
C TRACH RRNA SPEC QL NAA+PROBE: SIGNIFICANT CHANGE UP
C TRACH RRNA SPEC QL NAA+PROBE: SIGNIFICANT CHANGE UP
CALCIUM SERPL-MCNC: 9.2 MG/DL — SIGNIFICANT CHANGE UP (ref 8.4–10.5)
CHLORIDE SERPL-SCNC: 100 MMOL/L — SIGNIFICANT CHANGE UP (ref 98–107)
CO2 SERPL-SCNC: 23 MMOL/L — SIGNIFICANT CHANGE UP (ref 22–31)
CREAT SERPL-MCNC: 0.86 MG/DL — SIGNIFICANT CHANGE UP (ref 0.5–1.3)
GLUCOSE SERPL-MCNC: 99 MG/DL — SIGNIFICANT CHANGE UP (ref 70–99)
HCT VFR BLD CALC: 44.7 % — SIGNIFICANT CHANGE UP (ref 39–50)
HGB BLD-MCNC: 15.4 G/DL — SIGNIFICANT CHANGE UP (ref 13–17)
MAGNESIUM SERPL-MCNC: 2.1 MG/DL — SIGNIFICANT CHANGE UP (ref 1.6–2.6)
MCHC RBC-ENTMCNC: 31.1 PG — SIGNIFICANT CHANGE UP (ref 27–34)
MCHC RBC-ENTMCNC: 34.5 % — SIGNIFICANT CHANGE UP (ref 32–36)
MCV RBC AUTO: 90.3 FL — SIGNIFICANT CHANGE UP (ref 80–100)
N GONORRHOEA RRNA SPEC QL NAA+PROBE: SIGNIFICANT CHANGE UP
N GONORRHOEA RRNA SPEC QL NAA+PROBE: SIGNIFICANT CHANGE UP
NRBC # FLD: 0 — SIGNIFICANT CHANGE UP
PHOSPHATE SERPL-MCNC: 5 MG/DL — HIGH (ref 2.5–4.5)
PLATELET # BLD AUTO: 210 K/UL — SIGNIFICANT CHANGE UP (ref 150–400)
PMV BLD: 12.1 FL — SIGNIFICANT CHANGE UP (ref 7–13)
POTASSIUM SERPL-MCNC: 3.7 MMOL/L — SIGNIFICANT CHANGE UP (ref 3.5–5.3)
POTASSIUM SERPL-SCNC: 3.7 MMOL/L — SIGNIFICANT CHANGE UP (ref 3.5–5.3)
PSA FLD-MCNC: 5.23 NG/ML — HIGH (ref 0–4)
RBC # BLD: 4.95 M/UL — SIGNIFICANT CHANGE UP (ref 4.2–5.8)
RBC # FLD: 11.6 % — SIGNIFICANT CHANGE UP (ref 10.3–14.5)
SODIUM SERPL-SCNC: 140 MMOL/L — SIGNIFICANT CHANGE UP (ref 135–145)
SPECIMEN SOURCE: SIGNIFICANT CHANGE UP
SPECIMEN SOURCE: SIGNIFICANT CHANGE UP
WBC # BLD: 8.08 K/UL — SIGNIFICANT CHANGE UP (ref 3.8–10.5)
WBC # FLD AUTO: 8.08 K/UL — SIGNIFICANT CHANGE UP (ref 3.8–10.5)

## 2018-10-17 PROCEDURE — 72158 MRI LUMBAR SPINE W/O & W/DYE: CPT | Mod: 26

## 2018-10-17 PROCEDURE — 99233 SBSQ HOSP IP/OBS HIGH 50: CPT | Mod: GC

## 2018-10-17 RX ORDER — NICOTINE POLACRILEX 2 MG
1 GUM BUCCAL
Qty: 30 | Refills: 0
Start: 2018-10-17

## 2018-10-17 RX ORDER — METHOCARBAMOL 500 MG/1
1 TABLET, FILM COATED ORAL
Qty: 60 | Refills: 0 | OUTPATIENT
Start: 2018-10-17 | End: 2018-11-15

## 2018-10-17 RX ORDER — AMLODIPINE BESYLATE 2.5 MG/1
1 TABLET ORAL
Qty: 30 | Refills: 0 | OUTPATIENT
Start: 2018-10-17 | End: 2018-11-15

## 2018-10-17 RX ORDER — IBUPROFEN 200 MG
1 TABLET ORAL
Qty: 60 | Refills: 0
Start: 2018-10-17 | End: 2018-11-15

## 2018-10-17 RX ORDER — AMLODIPINE BESYLATE 2.5 MG/1
1 TABLET ORAL
Qty: 0 | Refills: 0 | COMMUNITY
Start: 2018-10-17

## 2018-10-17 RX ORDER — GABAPENTIN 400 MG/1
1 CAPSULE ORAL
Qty: 90 | Refills: 0 | OUTPATIENT
Start: 2018-10-17 | End: 2018-11-15

## 2018-10-17 RX ORDER — AMLODIPINE BESYLATE 2.5 MG/1
1 TABLET ORAL
Qty: 30 | Refills: 0
Start: 2018-10-17 | End: 2018-11-15

## 2018-10-17 RX ORDER — GABAPENTIN 400 MG/1
1 CAPSULE ORAL
Qty: 0 | Refills: 0 | COMMUNITY
Start: 2018-10-17

## 2018-10-17 RX ORDER — METHOCARBAMOL 500 MG/1
1 TABLET, FILM COATED ORAL
Qty: 0 | Refills: 0 | COMMUNITY

## 2018-10-17 RX ORDER — GABAPENTIN 400 MG/1
1 CAPSULE ORAL
Qty: 90 | Refills: 0
Start: 2018-10-17 | End: 2018-11-15

## 2018-10-17 RX ORDER — LIDOCAINE 4 G/100G
1 CREAM TOPICAL
Qty: 30 | Refills: 0 | OUTPATIENT
Start: 2018-10-17

## 2018-10-17 RX ORDER — IBUPROFEN 200 MG
1 TABLET ORAL
Qty: 0 | Refills: 0 | COMMUNITY

## 2018-10-17 RX ORDER — NICOTINE POLACRILEX 2 MG
1 GUM BUCCAL
Qty: 30 | Refills: 0 | OUTPATIENT
Start: 2018-10-17

## 2018-10-17 RX ORDER — LIDOCAINE 4 G/100G
1 CREAM TOPICAL
Qty: 0 | Refills: 0 | COMMUNITY
Start: 2018-10-17

## 2018-10-17 RX ORDER — MORPHINE SULFATE 50 MG/1
2 CAPSULE, EXTENDED RELEASE ORAL ONCE
Qty: 0 | Refills: 0 | Status: DISCONTINUED | OUTPATIENT
Start: 2018-10-17 | End: 2018-10-17

## 2018-10-17 RX ADMIN — METHOCARBAMOL 750 MILLIGRAM(S): 500 TABLET, FILM COATED ORAL at 06:04

## 2018-10-17 RX ADMIN — GABAPENTIN 300 MILLIGRAM(S): 400 CAPSULE ORAL at 22:22

## 2018-10-17 RX ADMIN — OXYCODONE HYDROCHLORIDE 10 MILLIGRAM(S): 5 TABLET ORAL at 05:24

## 2018-10-17 RX ADMIN — OXYCODONE HYDROCHLORIDE 10 MILLIGRAM(S): 5 TABLET ORAL at 23:09

## 2018-10-17 RX ADMIN — Medication 1 PATCH: at 15:41

## 2018-10-17 RX ADMIN — LIDOCAINE 1 PATCH: 4 CREAM TOPICAL at 01:00

## 2018-10-17 RX ADMIN — LIDOCAINE 1 PATCH: 4 CREAM TOPICAL at 15:38

## 2018-10-17 RX ADMIN — AMLODIPINE BESYLATE 10 MILLIGRAM(S): 2.5 TABLET ORAL at 06:04

## 2018-10-17 RX ADMIN — MORPHINE SULFATE 2 MILLIGRAM(S): 50 CAPSULE, EXTENDED RELEASE ORAL at 14:31

## 2018-10-17 RX ADMIN — Medication 600 MILLIGRAM(S): at 06:05

## 2018-10-17 RX ADMIN — SENNA PLUS 2 TABLET(S): 8.6 TABLET ORAL at 22:22

## 2018-10-17 RX ADMIN — MORPHINE SULFATE 2 MILLIGRAM(S): 50 CAPSULE, EXTENDED RELEASE ORAL at 15:46

## 2018-10-17 RX ADMIN — METHOCARBAMOL 750 MILLIGRAM(S): 500 TABLET, FILM COATED ORAL at 18:52

## 2018-10-17 RX ADMIN — ENOXAPARIN SODIUM 40 MILLIGRAM(S): 100 INJECTION SUBCUTANEOUS at 15:40

## 2018-10-17 RX ADMIN — Medication 100 MILLIGRAM(S): at 22:22

## 2018-10-17 RX ADMIN — GABAPENTIN 300 MILLIGRAM(S): 400 CAPSULE ORAL at 15:36

## 2018-10-17 RX ADMIN — OXYCODONE HYDROCHLORIDE 10 MILLIGRAM(S): 5 TABLET ORAL at 22:22

## 2018-10-17 RX ADMIN — Medication 600 MILLIGRAM(S): at 07:00

## 2018-10-17 RX ADMIN — GABAPENTIN 300 MILLIGRAM(S): 400 CAPSULE ORAL at 06:04

## 2018-10-17 RX ADMIN — Medication 1 PATCH: at 15:36

## 2018-10-17 RX ADMIN — OXYCODONE HYDROCHLORIDE 10 MILLIGRAM(S): 5 TABLET ORAL at 04:24

## 2018-10-17 NOTE — DISCHARGE NOTE ADULT - ADDITIONAL INSTRUCTIONS
Please follow up with primary care doctor and orthopedic doctor.  Please continue taking your medications and follow up with physical therapy. Please follow up with primary care doctor and orthopedic doctor.  A number has been provided below.  Please continue taking your medications and follow up with physical therapy. Please follow up with primary care doctor and orthopedic doctor.  A number has been provided below.  Please follow up with the orthopedic surgeon in the next 1-2 weeks.  Please continue taking your medications and follow up with physical therapy.

## 2018-10-17 NOTE — PROGRESS NOTE ADULT - ASSESSMENT
61 y/o male with PMHx of diabetes (no longer on medications), hearing impairment, chronic back and bilateral lower extremity pain, active smoker admitted for worsening right leg pain and numbness. MRI was ordered and pending.

## 2018-10-17 NOTE — PROGRESS NOTE ADULT - PROBLEM SELECTOR PLAN 5
Patient presenting with bradycardia HR 45-50  - EKG sinus bradycardia  - asymptomatic, currently HR 60s  - will continue to monitor

## 2018-10-17 NOTE — DISCHARGE NOTE ADULT - MEDICATION SUMMARY - MEDICATIONS TO TAKE
I will START or STAY ON the medications listed below when I get home from the hospital:    rolling walker  -- 1   -- Indication: For Walking    Alivio 600 mg oral tablet  -- 1 tab(s) by mouth 2 times a day, As Needed   -- Do not take this drug if you are pregnant.  It is very important that you take or use this exactly as directed.  Do not skip doses or discontinue unless directed by your doctor.  May cause drowsiness or dizziness.  Obtain medical advice before taking any non-prescription drugs as some may affect the action of this medication.  Take with food or milk.    -- Indication: For Anti-inflammation     gabapentin 300 mg oral capsule  -- 1 cap(s) by mouth 3 times a day  -- Indication: For Nerve pain    amLODIPine 10 mg oral tablet  -- 1 tab(s) by mouth once a day  -- Indication: For Hypertension    methocarbamol 750 mg oral tablet  -- 1 tab(s) by mouth 2 times a day  -- Indication: For Muscle relaxor    nicotine 7 mg/24 hr transdermal film, extended release  -- 1 patch by transdermal patch once a day  -- Indication: For Smoking cessation I will START or STAY ON the medications listed below when I get home from the hospital:    rolling walker  -- 1   -- Indication: For Walking    dexamethasone 2 mg oral tablet  -- 5 tab(s) by mouth every 6 hours  on 10/18 only   -- It is very important that you take or use this exactly as directed.  Do not skip doses or discontinue unless directed by your doctor.  Obtain medical advice before taking any non-prescription drugs as some may affect the action of this medication.  Take with food or milk.    -- Indication: For Central canal stenosis    dexamethasone 1 mg oral tablet  -- 5 tab(s) by mouth every 6 hours on 10/19, then 3 tabs every 6 hours on 10/20, then 1 tab every 6 hours on 10/21, then stop  -- It is very important that you take or use this exactly as directed.  Do not skip doses or discontinue unless directed by your doctor.  Obtain medical advice before taking any non-prescription drugs as some may affect the action of this medication.  Take with food or milk.    -- Indication: For Central canal stenosis    Alivio 600 mg oral tablet  -- 1 tab(s) by mouth 2 times a day, As Needed   -- Do not take this drug if you are pregnant.  It is very important that you take or use this exactly as directed.  Do not skip doses or discontinue unless directed by your doctor.  May cause drowsiness or dizziness.  Obtain medical advice before taking any non-prescription drugs as some may affect the action of this medication.  Take with food or milk.    -- Indication: For Anti-inflammation     gabapentin 300 mg oral capsule  -- 1 cap(s) by mouth 3 times a day  -- Indication: For Nerve pain    amLODIPine 10 mg oral tablet  -- 1 tab(s) by mouth once a day  -- Indication: For Hypertension    methocarbamol 750 mg oral tablet  -- 1 tab(s) by mouth 2 times a day  -- Indication: For Muscle relaxor    nicotine 7 mg/24 hr transdermal film, extended release  -- 1 patch by transdermal patch once a day  -- Indication: For Smoking cessation

## 2018-10-17 NOTE — DISCHARGE NOTE ADULT - OTHER SIGNIFICANT FINDINGS
< from: MR Lumbar Spine w/wo IV Cont (10.17.18 @ 16:33) >  FINDINGS: There is mild loss of the normal lumbar lordosis. There is disc   desiccation at L2-3 through L4-5.    There is anterior spondylosis at L2-3 through L5-S1.    L1-2: Normal.    L2-3: Mild disc bulging. Bilateral facet and ligamentous degenerative   change. Moderate central canal and mild bilateral neural foraminal   stenosis.    L3-4: Circumferential disc bulging with bilateral facet and ligamentous   hypertrophic degenerative change. Severe central canal and moderate   bilateral neural foraminal stenosis.    L4-5: Circumferential disc bulging with a right parasagittal disc   extrusion extending superiorly into the right L4 recess where results in   mass effect upon the descending right L5 nerve root and compression of   the thecal sac. Bilateral facet and ligamentous hypertrophic changes   contribute to severe central canal stenosis. Underlying disc bulging   results in moderate bilateral neural foraminal stenosis. A small amount   of enhancing soft tissue surrounds extruded fragment likely representing   granulation tissue.    L5-S1: Circumferential disc bulging and bilateral facet arthropathy. A   mild bilateral neural foraminal stenosis.    The marrow signal is normal. The conus is within normal limits.    The overall diameter of the central canal small consistent with a   component of central spinal canal stenosis.    Impression:    Moderate central canal stenosis at 23.  Severe central canal stenosis at L3-4 and L4-5.  L4-5-1 right parasagittal extruded disc fragment extends superiorly   behind the                  JEANNETTE BECERRA M.D., ATTENDING RADIOLOGIST  This document has been electronically signed. Oct 17 2018  4:25PM    < end of copied text >

## 2018-10-17 NOTE — PROGRESS NOTE ADULT - SUBJECTIVE AND OBJECTIVE BOX
Richy Martinez Ma, MD  PGY 1 Internal Medicine  Pager: [phone]    Patient is a 62y old  Male who presents with a chief complaint of Leg numbness/pain (16 Oct 2018 08:18)      SUBJECTIVE / OVERNIGHT EVENTS: NAEO. Reports a small BM yesterday and stable right leg pain/numbness. Also reports small transient nosebleed 2/2 nose picking. Otherwise denies fever, chills, chest pain, abdominal pain, nausea, or vomiting. He is agreeable to go to rehab at the time of discharge.          MEDICATIONS  (STANDING):  amLODIPine   Tablet 10 milliGRAM(s) Oral daily  docusate sodium 100 milliGRAM(s) Oral three times a day  enoxaparin Injectable 40 milliGRAM(s) SubCutaneous daily  gabapentin 300 milliGRAM(s) Oral three times a day  lidocaine   Patch 1 Patch Transdermal daily  lidocaine   Patch 1 Patch Transdermal daily  methocarbamol 750 milliGRAM(s) Oral two times a day  nicotine -   7 mG/24Hr(s) Patch 1 patch Transdermal daily  senna 2 Tablet(s) Oral at bedtime    MEDICATIONS  (PRN):  ibuprofen  Tablet. 600 milliGRAM(s) Oral every 6 hours PRN Moderate Pain (4 - 6)  oxyCODONE    IR 5 milliGRAM(s) Oral once PRN Moderate Pain (4 - 6)  oxyCODONE    IR 10 milliGRAM(s) Oral every 6 hours PRN Severe Pain (7 - 10)  polyethylene glycol 3350 17 Gram(s) Oral daily PRN Constipation      Vital Signs Last 24 Hrs  T(C): 36.7 (17 Oct 2018 06:01), Max: 37.3 (16 Oct 2018 15:12)  T(F): 98.1 (17 Oct 2018 06:01), Max: 99.1 (16 Oct 2018 15:12)  HR: 56 (17 Oct 2018 06:01) (50 - 60)  BP: 163/86 (17 Oct 2018 06:01) (141/91 - 163/86)  BP(mean): --  RR: 17 (17 Oct 2018 06:01) (17 - 18)  SpO2: 98% (17 Oct 2018 06:01) (98% - 99%)      PHYSICAL EXAM  Gen: Alert, well-developed, NAD  HEENT: NCAT, EOMI, conjunctiva clear, sclera anicteric  Neck: Supple  CV: RRR, S1S2, no murmurs. 2+ DP/PT pulse on right foot.   Resp: CTAB anteriorly, nonlabored respiratory effort, no wheezing or rales  Abd: Soft, NT, ND, +BS  Ext: no peripheral edema. Intact ROM left leg. Right leg ROM is mildly limited compared to left leg, likely 2/2 pain/stiffness. Can wiggle right toes.  Neuro: no focal deficits  SKIN: no rash. Small punctate excoriations on right shin, but appears chronic and stable, not acute. Also several papular lesions on penile shaft near glans, painless, no erythema or obvious ulcer, no penile discharge, appear stable    CAPILLARY BLOOD GLUCOSE        I&O's Summary    16 Oct 2018 07:01  -  17 Oct 2018 07:00  --------------------------------------------------------  IN: 0 mL / OUT: 1550 mL / NET: -1550 mL        LABS:                        15.4   8.08  )-----------( 210      ( 17 Oct 2018 06:00 )             44.7     10-17    140  |  100  |  18  ----------------------------<  99  3.7   |  23  |  0.86    Ca    9.2      17 Oct 2018 06:00  Phos  5.0     10-17  Mg     2.1     10-17        CARDIAC MARKERS ( 16 Oct 2018 06:25 )  x     / x     / 414 u/L / x     / x              RADIOLOGY & ADDITIONAL TESTS:     MICROBIOLOGY:    ANTIMICROBIALS:    CONSULTS:

## 2018-10-17 NOTE — DISCHARGE NOTE ADULT - CONDITIONS AT DISCHARGE
Patient is alert and oriented x 4, partial assistance for his care, he can be out of bed with the Rolling Walker .  current smoker and encouraged t quit.  He did have his Meds delivered to bed and Discharge Instructions are discussed and given.

## 2018-10-17 NOTE — DISCHARGE NOTE ADULT - HOSPITAL COURSE
61 y/o male with PMHx of diabetes (no longer on medications), hearing impairment, chronic back and bilateral lower extremity pain, active smoker admitted for worsening right leg pain and numbness. MRI showed severe central canal stenosis at L3-4 and L4-5. 62M h/o DM2 (no longer on medications), hearing impairment, chronic back and bilateral lower extremity pain, active smoker adm 10/13 for worsening right leg pain and numbness found to have severe central canal stenosis.    The patient underwent a duplex, which did not reveal DVT.  He was started on gabapentin.  There was initially concern for zoster, and the patient was started on acyclovir.  However, suspicion was determined to be low, and this was discontinued.  Lumbar x-ray was performed, which was unrevealing.  The patient was started on a regimen for the back pain including gabapentin, lidocaine patches, methocarbamol and oxycodone PRN.  During his course, the patient's BP was noted to be elevated, and he was started on amlodipine, which was eventually increased to 10mg q day.  The patient also had a penile lesion with a negative GC/C and HIV.  The patient underwent MRI and was found to have severe central canal stenosis.  The patient was evaluated by Ortho and started on a methylprednisolone taper.  He will complete this taper and also follow up with Orthopedic Surgery (Dr. Huddleston) in the next 1-2 weeks.  He will not be discharged on oxycodone. 62M h/o DM2 (no longer on medications), hearing impairment, chronic back and bilateral lower extremity pain, active smoker adm 10/13 for worsening right leg pain and numbness found to have severe central canal stenosis.    The patient underwent a duplex, which did not reveal DVT.  He was started on gabapentin.  There was initially concern for zoster, and the patient was started on acyclovir.  However, suspicion was determined to be low, and this was discontinued.  Lumbar x-ray was performed, which was unrevealing.  The patient was started on a regimen for the back pain including gabapentin, lidocaine patches, methocarbamol and oxycodone PRN.  During his course, the patient's BP was noted to be elevated, and he was started on amlodipine, which was eventually increased to 10mg q day.  The patient also had a penile lesion with a negative GC/C and HIV.  The patient underwent MRI and was found to have severe central canal stenosis.  The patient was evaluated by Ortho and started on a methylprednisolone taper.  He will complete this taper and also follow up with Orthopedic Surgery (Dr. Huddleston) in the next 1-2 weeks.  He will not be discharged on oxycodone.  The patient will be discharged with home PT. 62M h/o DM2 (no longer on medications), hearing impairment, chronic back and bilateral lower extremity pain, active smoker adm 10/13 for worsening right leg pain and numbness found to have severe central canal stenosis on L3-4 and L4-5.     The patient underwent a duplex, which did not reveal DVT.  He was started on gabapentin.  There was initially concern for zoster, and the patient was started on acyclovir.  However, suspicion was determined to be low, and this was discontinued.  Lumbar x-ray was performed, which was unrevealing with no fracture or lytic lesions.  The patient was started on a regimen for the back pain including gabapentin, lidocaine patches, methocarbamol and oxycodone 10 mg q6hr PRN.  During his course, the patient's BP was noted to be elevated, and he was started on amlodipine, which was eventually increased to 10mg q day.  The patient also had a penile papular lesion with a negative GC/C and HIV.  The patient underwent MRI and was found to have severe central canal stenosis on L3-4 and L4-5.  The patient was evaluated by Ortho and started on a methylprednisolone taper.  He will complete this taper and also follow up with Orthopedic Surgery (Dr. Huddleston) in the next 1-2 weeks.  He will not be discharged on oxycodone.  The patient will be discharged home with home PT. He is medically stable and safe for discharge.

## 2018-10-17 NOTE — PROGRESS NOTE ADULT - PROBLEM SELECTOR PLAN 3
Several small painless papules on the shaft of penis near glans. No erythema, ulcer, penile discharge, bleeding, or dysuria.  - Hx of gonorrhea in the past  - HIV, GC/chlamydia, syphilis negative  - Will continue to monitor

## 2018-10-17 NOTE — PROGRESS NOTE ADULT - PROBLEM SELECTOR PLAN 1
Differential includes neuropathy, radiculopathy (patient with history of "nerve damage" according to Neurologist), no signs of cord compression, no signs of vascular compromise.  - RLE punctate excoriations, less likely vesicular rash of varicella, D/brittany acyclovir  - History of chronic lower back and R>L LE shooting pain, numbness, tingling. Likely  radiculopathy  - lumbosacral spine XR was negative for fracture or lytic lesion  - Pain control with lidoderm patch, gabapentin 300 mg TID, methocarbamol 750 mg BID, and oxycodone 10 prn for breakthrough pain  - Outpatient neurologist, Dr. Denton, recently moved to [place] office and no longer have records of the patient. PCP, Dr. Kyle King, also does not have MRI records. Patient reports MRI was done ~5 years ago.  - Doppler US negative for DVT  - CK level trending down from 739 at presentation to 414 currently  - Ordered MRI of lumbar spine for further eval, given recent weight loss, pain worse at night, and worsening right leg pain/numbness

## 2018-10-17 NOTE — DISCHARGE NOTE ADULT - CARE PLAN
Principal Discharge DX:	Leg pain  Goal:	Physical therapy, continue your medications, follow up with your primary care doctor, orthopedic doctor, and neurologist  Assessment and plan of treatment:	You came to the hospital with worsening back pain, right leg pain, leg numbness and weakness. Imaging studies, including MRI, were performed and showed severe spinal canal stenosis in your lower back. In other words, there's a hole or canal that your spinal cord goes through and this canal or hole is smaller, causing nerve pain that you are experiencing. For treatments, you will have to continue your medications and physical therapy, and follow up with your primary care doctor, orthopedic doctor, and neurologist.  Secondary Diagnosis:	HTN (hypertension)  Goal:	Normal blood pressure, continue medications, and follow up with your primary care doctor  Assessment and plan of treatment:	You are diagnosed with high blood pressure and it is important to control your blood pressure to prevent serious complications. With blood pressure medications, your blood pressure improved. Please continue with your medication and follow up with your primary care doctor. Principal Discharge DX:	Leg pain  Goal:	Physical therapy, continue your medications, follow up with your primary care doctor, orthopedic doctor, and neurologist  Assessment and plan of treatment:	You came to the hospital with worsening back pain, right leg pain, leg numbness and weakness. Imaging studies, including MRI, were performed and showed severe spinal canal stenosis in your lower back. In other words, there's a hole or canal that your spinal cord goes through and this canal or hole is smaller, causing nerve pain that you are experiencing. For treatments, you will have to continue your medications and physical therapy, and follow up with your primary care doctor, orthopedic doctor, and neurologist.  A number for an orthopedic surgeon has josé provided below.  Secondary Diagnosis:	HTN (hypertension)  Goal:	Normal blood pressure, continue medications, and follow up with your primary care doctor  Assessment and plan of treatment:	You are diagnosed with high blood pressure and it is important to control your blood pressure to prevent serious complications. With blood pressure medications, your blood pressure improved. Please continue with your medication and follow up with your primary care doctor. Principal Discharge DX:	Leg pain  Goal:	Physical therapy, continue your medications, follow up with your primary care doctor, orthopedic doctor, and neurologist  Assessment and plan of treatment:	You came to the hospital with worsening back pain, right leg pain, leg numbness and weakness. Imaging studies, including MRI, were performed and showed severe spinal canal stenosis in your lower back. In other words, there's a hole or canal that your spinal cord goes through and this canal or hole is smaller, causing nerve pain that you are experiencing. For treatments, you will have to continue your medications and physical therapy, and follow up with your primary care doctor, orthopedic doctor, and neurologist.  A number for an orthopedic surgeon has josé provided below.  Please follow up with the orthopedic surgeon in the next 1-2 weeks.  Secondary Diagnosis:	HTN (hypertension)  Goal:	Normal blood pressure, continue medications, and follow up with your primary care doctor  Assessment and plan of treatment:	You are diagnosed with high blood pressure and it is important to control your blood pressure to prevent serious complications. With blood pressure medications, your blood pressure improved. Please continue with your medication and follow up with your primary care doctor.

## 2018-10-17 NOTE — DISCHARGE NOTE ADULT - PROVIDER TOKENS
FREE:[LAST:[Christine],FIRST:[Varghese],PHONE:[phone]],FAX:[(   )    -],ADDRESS:[[address] [place] [address]]],FREE:[LAST:[Corrie],FIRST:[Gene],PHONE:[phone]],FAX:[(   )    -],ADDRESS:[[address] [place] [address]]] FREE:[LAST:[Christine],FIRST:[Varghese],PHONE:[phone]],FAX:[(   )    -],ADDRESS:[[address] [place] [address]]],FREE:[LAST:[Corrie],FIRST:[Gene],PHONE:[phone]],FAX:[(   )    -],ADDRESS:[[address] [place] [address]]],TOKEN:'[id number]'

## 2018-10-17 NOTE — PROGRESS NOTE ADULT - PROBLEM SELECTOR PLAN 2
Patient with SBP up to 204 in ED, improved to 140/71 without intervention  - Increased amlodipine from 2.5 -> 5 -> 10 2/2 -170s on 10/16. Currently SBP 140s.  - Likely exacerbated by pain; will adjust pain control  - Will continue to monitor

## 2018-10-17 NOTE — DISCHARGE NOTE ADULT - CARE PROVIDER_API CALL
Varghese King  [address] [place] [address]  Phone: [phone]  Fax: (   )    -    Jacob Denton  [address] [place] [address]  Phone: [phone]  Fax: (   )    - Varghese King  [address] [place] [address]  Phone: [phone]  Fax: (   )    -    Jacob Denton  [address] [place] [address]  Phone: [phone]  Fax: (   )    -    Norman Huddleston), Orthopaedic Surgery  [address] [address]  Phone: [phone]  Fax: [phone]

## 2018-10-17 NOTE — DISCHARGE NOTE ADULT - MEDICATION SUMMARY - MEDICATIONS TO STOP TAKING
I will STOP taking the medications listed below when I get home from the hospital:    Motrin 600 mg oral tablet  -- 1 tab(s) by mouth 2 times a day, As Needed

## 2018-10-17 NOTE — DISCHARGE NOTE ADULT - CARE PROVIDERS DIRECT ADDRESSES
,DirectAddress_Unknown,DirectAddress_Unknown ,DirectAddress_Unknown,DirectAddress_Unknown,[email or web address]

## 2018-10-17 NOTE — DISCHARGE NOTE ADULT - PLAN OF CARE
Physical therapy, continue your medications, follow up with your primary care doctor, orthopedic doctor, and neurologist You came to the hospital with worsening back pain, right leg pain, leg numbness and weakness. Imaging studies, including MRI, were performed and showed severe spinal canal stenosis in your lower back. In other words, there's a hole or canal that your spinal cord goes through and this canal or hole is smaller, causing nerve pain that you are experiencing. For treatments, you will have to continue your medications and physical therapy, and follow up with your primary care doctor, orthopedic doctor, and neurologist. Normal blood pressure, continue medications, and follow up with your primary care doctor You are diagnosed with high blood pressure and it is important to control your blood pressure to prevent serious complications. With blood pressure medications, your blood pressure improved. Please continue with your medication and follow up with your primary care doctor. You came to the hospital with worsening back pain, right leg pain, leg numbness and weakness. Imaging studies, including MRI, were performed and showed severe spinal canal stenosis in your lower back. In other words, there's a hole or canal that your spinal cord goes through and this canal or hole is smaller, causing nerve pain that you are experiencing. For treatments, you will have to continue your medications and physical therapy, and follow up with your primary care doctor, orthopedic doctor, and neurologist.  A number for an orthopedic surgeon has josé provided below. You came to the hospital with worsening back pain, right leg pain, leg numbness and weakness. Imaging studies, including MRI, were performed and showed severe spinal canal stenosis in your lower back. In other words, there's a hole or canal that your spinal cord goes through and this canal or hole is smaller, causing nerve pain that you are experiencing. For treatments, you will have to continue your medications and physical therapy, and follow up with your primary care doctor, orthopedic doctor, and neurologist.  A number for an orthopedic surgeon has josé provided below.  Please follow up with the orthopedic surgeon in the next 1-2 weeks.

## 2018-10-17 NOTE — DISCHARGE NOTE ADULT - HOME CARE AGENCY
HEALTH  @ HOME [facility]  [phone] [facility]  [phone]

## 2018-10-18 VITALS
HEART RATE: 50 BPM | OXYGEN SATURATION: 96 % | SYSTOLIC BLOOD PRESSURE: 110 MMHG | RESPIRATION RATE: 18 BRPM | DIASTOLIC BLOOD PRESSURE: 64 MMHG | TEMPERATURE: 98 F

## 2018-10-18 PROCEDURE — 99239 HOSP IP/OBS DSCHRG MGMT >30: CPT

## 2018-10-18 RX ORDER — DEXAMETHASONE 0.5 MG/5ML
5 ELIXIR ORAL
Qty: 10 | Refills: 0
Start: 2018-10-18

## 2018-10-18 RX ORDER — DEXAMETHASONE 0.5 MG/5ML
ELIXIR ORAL
Qty: 0 | Refills: 0 | Status: DISCONTINUED | OUTPATIENT
Start: 2018-10-18 | End: 2018-10-18

## 2018-10-18 RX ORDER — DEXAMETHASONE 0.5 MG/5ML
10 ELIXIR ORAL EVERY 6 HOURS
Qty: 0 | Refills: 0 | Status: DISCONTINUED | OUTPATIENT
Start: 2018-10-18 | End: 2018-10-18

## 2018-10-18 RX ORDER — DEXAMETHASONE 0.5 MG/5ML
5 ELIXIR ORAL
Qty: 36 | Refills: 0
Start: 2018-10-18

## 2018-10-18 RX ORDER — DEXAMETHASONE 0.5 MG/5ML
5 ELIXIR ORAL EVERY 6 HOURS
Qty: 0 | Refills: 0 | Status: DISCONTINUED | OUTPATIENT
Start: 2018-10-19 | End: 2018-10-18

## 2018-10-18 RX ORDER — DEXAMETHASONE 0.5 MG/5ML
1 ELIXIR ORAL EVERY 6 HOURS
Qty: 0 | Refills: 0 | Status: CANCELLED | OUTPATIENT
Start: 2018-10-21 | End: 2018-10-18

## 2018-10-18 RX ORDER — DEXAMETHASONE 0.5 MG/5ML
3 ELIXIR ORAL EVERY 6 HOURS
Qty: 0 | Refills: 0 | Status: CANCELLED | OUTPATIENT
Start: 2018-10-20 | End: 2018-10-18

## 2018-10-18 RX ADMIN — GABAPENTIN 300 MILLIGRAM(S): 400 CAPSULE ORAL at 05:53

## 2018-10-18 RX ADMIN — OXYCODONE HYDROCHLORIDE 10 MILLIGRAM(S): 5 TABLET ORAL at 05:54

## 2018-10-18 RX ADMIN — OXYCODONE HYDROCHLORIDE 5 MILLIGRAM(S): 5 TABLET ORAL at 16:58

## 2018-10-18 RX ADMIN — OXYCODONE HYDROCHLORIDE 10 MILLIGRAM(S): 5 TABLET ORAL at 12:35

## 2018-10-18 RX ADMIN — GABAPENTIN 300 MILLIGRAM(S): 400 CAPSULE ORAL at 13:18

## 2018-10-18 RX ADMIN — Medication 100 MILLIGRAM(S): at 05:53

## 2018-10-18 RX ADMIN — LIDOCAINE 1 PATCH: 4 CREAM TOPICAL at 03:10

## 2018-10-18 RX ADMIN — Medication 600 MILLIGRAM(S): at 06:40

## 2018-10-18 RX ADMIN — Medication 600 MILLIGRAM(S): at 06:00

## 2018-10-18 RX ADMIN — OXYCODONE HYDROCHLORIDE 5 MILLIGRAM(S): 5 TABLET ORAL at 16:28

## 2018-10-18 RX ADMIN — Medication 1 PATCH: at 18:32

## 2018-10-18 RX ADMIN — LIDOCAINE 1 PATCH: 4 CREAM TOPICAL at 12:37

## 2018-10-18 RX ADMIN — Medication 10 MILLIGRAM(S): at 13:16

## 2018-10-18 RX ADMIN — Medication 10 MILLIGRAM(S): at 18:45

## 2018-10-18 RX ADMIN — Medication 1 PATCH: at 08:18

## 2018-10-18 RX ADMIN — METHOCARBAMOL 750 MILLIGRAM(S): 500 TABLET, FILM COATED ORAL at 05:53

## 2018-10-18 RX ADMIN — Medication 100 MILLIGRAM(S): at 13:18

## 2018-10-18 RX ADMIN — METHOCARBAMOL 750 MILLIGRAM(S): 500 TABLET, FILM COATED ORAL at 18:46

## 2018-10-18 RX ADMIN — OXYCODONE HYDROCHLORIDE 10 MILLIGRAM(S): 5 TABLET ORAL at 12:57

## 2018-10-18 RX ADMIN — Medication 1 PATCH: at 12:58

## 2018-10-18 RX ADMIN — ENOXAPARIN SODIUM 40 MILLIGRAM(S): 100 INJECTION SUBCUTANEOUS at 12:36

## 2018-10-18 RX ADMIN — OXYCODONE HYDROCHLORIDE 10 MILLIGRAM(S): 5 TABLET ORAL at 04:31

## 2018-10-18 RX ADMIN — LIDOCAINE 1 PATCH: 4 CREAM TOPICAL at 18:43

## 2018-10-18 RX ADMIN — Medication 1 PATCH: at 12:55

## 2018-10-18 RX ADMIN — AMLODIPINE BESYLATE 10 MILLIGRAM(S): 2.5 TABLET ORAL at 05:53

## 2018-10-18 NOTE — PROGRESS NOTE ADULT - ATTENDING COMMENTS
LBP with severe canal stenosis seen on MRI, steroids initiated; pt to f/u with ortho as o/p, continue gabapentin and ibuprofen as needed  38 minutes spent on discharge plan of care including reviewing plan with patient and mom
pt reports 20 pound weight loss over past 2 years and notes Leg pain has worsened recently; associated with numbness  AFebrile  AAOx3, RLE strength 4-/5 sensation intact b/l, LLE 4+/5    Back pain: in setting of weight loss and worsening of symptoms will check MRI back, pt reports having had no age appropriate cancer screening including colonoscopy, Prostate evaluation or CT chest given smoking history;  continue pain control.
Patient was seen and examined personally by me. I have discussed the plan and reviewed the resident's note and agree with the above physical exam findings including assessment and plan except as indicated below.    # acute on chronic LS radicular pain    Gabapentin, Oxy IR PRN severe pain, Motrin PRN for moderate pain, lidocaine patch  DC acyclovir, RLE without vesicles, appears to be skin excoriations only, low suspicion for shingles  PT recs: rehab
no acute overnight events  afebrile    Back pain: awaiting MRI, continue pain control and PT  HTN: increase norvasc

## 2018-10-18 NOTE — CONSULT NOTE ADULT - SUBJECTIVE AND OBJECTIVE BOX
62yMale w/ PMH of DM, [place], chronic LBP (managed by otpt Neurologist), is admitted with an acute exacerbation of his RLE radiculopathy and difficulty ambulating since Saturday. He noted intense episodes of difficulty moving leg, numbness, a pain in RLE, with difficulty ambulating. He denies recent trauma. He states that his pain is now improved, but he still gets periods of intense pain when in some positions, such as standing up straight, or sitting  on the toilet. He states that leaning over, such as on a shopping cart, improves his pain.     He states that he was seen at [facility], and was to undergo neck surgery a number of years ago, but it was canceled due to illness and he never followed up. He states that after that, his neck pain improved, but he developed lower back pain and radiculopathy. He noted that his legs have been getting smaller for some time.     He denies changes in bowel or bladder function. Denies fever/chills.   States that he chronically has EHL deficit in his left foot.     PMH:  Diabetes  No pertinent past medical history    PSH:  No significant past surgical history    AH:    Meds: See med rec    T(C): 37 (10-17-18 @ 22:03)  HR: 55 (10-17-18 @ 22:03)  BP: 157/84 (10-17-18 @ 22:03)  RR: 18 (10-17-18 @ 22:03)  SpO2: 100% (10-17-18 @ 22:03)  Wt(kg): --                        15.4   8.08  )-----------( 210      ( 17 Oct 2018 06:00 )             44.7     10-17    140  |  100  |  18  ----------------------------<  99  3.7   |  23  |  0.86    Ca    9.2      17 Oct 2018 06:00  Phos  5.0     10-17  Mg     2.1     10-17          Gen: NAD  Resp: Unlabored breathing   Spine PE:  Negative Straight leg raise  Negative clonus/Babinski/nunes  No saddle anesthesia    Motor:                   C5                C6              C7               C8           T1   R            5/5                5/5            5/5             5/5          5/5  L             5/5               5/5             5/5             5/5          5/5                L2             L3             L4               L5            S1  R         4/5           5/5          5/5             5/5           5/5  L          5/5          5/5           5/5             0/5           5/5    Sensory:            C5         C6         C7      C8       T1        (0=absent, 1=impaired, 2=normal, NT=not testable)  R         2            2           2        2         2  L          2            2           2        2         2               L2          L3         L4      L5       S1         (0=absent, 1=impaired, 2=normal, NT=not testable)  R         1            1           1        1        1  L          2            2           2        2         2      < from: MR Lumbar Spine w/wo IV Cont (10.17.18 @ 16:33) >    EXAM:  MR SPINE LUMBAR WAW IC        PROCEDURE DATE:  Oct 17 2018         INTERPRETATION:  MRI of the lumbar spine with contrast    CLINICAL INDICATION: Radiculopathy and weight loss    TECHNIQUE: Multiplanar, multisequence MR images of the lumbar spine were   obtained prior to and following the intravenous administration of 9 cc of   Gadavist.    COMPARISON: None available    FINDINGS: There is mild loss of the normal lumbar lordosis. There is disc   desiccation at L2-3 through L4-5.    There is anterior spondylosis at L2-3 through L5-S1.    L1-2: Normal.    L2-3: Mild disc bulging. Bilateral facet and ligamentous degenerative   change. Moderate central canal and mild bilateral neural foraminal   stenosis.    L3-4: Circumferential disc bulging with bilateral facet and ligamentous   hypertrophic degenerative change. Severe central canal and moderate   bilateral neural foraminal stenosis.    L4-5: Circumferential disc bulging with a right parasagittal disc   extrusion extending superiorly into the right L4 recess where results in   mass effect upon the descending right L5 nerve root and compression of   the thecal sac. Bilateral facet and ligamentous hypertrophic changes   contribute to severe central canal stenosis. Underlying disc bulging   results in moderate bilateral neural foraminal stenosis. A small amount   of enhancing soft tissue surrounds extruded fragment likely representing   granulation tissue.    L5-S1: Circumferential disc bulging and bilateral facet arthropathy. A   mild bilateral neural foraminal stenosis.    The marrow signal is normal. The conus is within normal limits.    The overall diameter of the central canal small consistent with a   component of central spinal canal stenosis.    Impression:    Moderate central canal stenosis at 23.  Severe central canal stenosis at L3-4 and L4-5.  L4-5-1 right parasagittal extruded disc fragment extends superiorly   behind the                  JEANNETTE BECERRA M.D., ATTENDING RADIOLOGIST  This document has been electronically signed. Oct 17 2018  4:25PM        < end of copied text >

## 2018-10-18 NOTE — PROGRESS NOTE ADULT - PROBLEM SELECTOR PLAN 5
Patient presenting with bradycardia HR 45-50  - EKG sinus bradycardia  - asymptomatic, currently HR 50-60s  - will continue to monitor

## 2018-10-18 NOTE — PROGRESS NOTE ADULT - PROBLEM SELECTOR PLAN 2
Patient with SBP up to 204 in ED, improved to 140/71 without intervention  - Increased amlodipine from 2.5 -> 5 -> 10 2/2 -170s on 10/16. Currently SBP 150s.  - Likely exacerbated by pain; will adjust pain control  - Will continue to monitor

## 2018-10-18 NOTE — PROGRESS NOTE ADULT - ASSESSMENT
61 y/o male with PMHx of diabetes (no longer on medications), hearing impairment, chronic back and bilateral lower extremity pain, active smoker admitted for worsening right leg pain and numbness. MRI showed several central canal stenosis at L3-4-5. Per ortho, no acute intervention, start oral steroid taper, and follow up as outpatient. He is otherwise medically stable for discharge.

## 2018-10-18 NOTE — PROGRESS NOTE ADULT - PROBLEM SELECTOR PROBLEM 1
Pain of right lower extremity

## 2018-10-18 NOTE — PROGRESS NOTE ADULT - PROBLEM SELECTOR PLAN 8
-DVT PPx: Lovenox 40   -Diet: Carb consistent, low sodium  -PT eval: Rehab

## 2018-10-18 NOTE — PROGRESS NOTE ADULT - SUBJECTIVE AND OBJECTIVE BOX
Richy Martinez Ma, MD  PGY 1 Internal Medicine  Pager: [phone]    Patient is a 62y old  Male who presents with a chief complaint of Leg numbness/pain (18 Oct 2018 05:11)      SUBJECTIVE / OVERNIGHT EVENTS: NAEO. Reports mild abdominal discomfort likely 2/2 bloating. Flatus+. Otherwise denies fever, chills, chest pain, SOB. BM 2 days ago. Refused some bowel regimen medications, because he reports he did not want to go to bathroom frequently since his bathroom was "too dirty."       MEDICATIONS  (STANDING):  amLODIPine   Tablet 10 milliGRAM(s) Oral daily  docusate sodium 100 milliGRAM(s) Oral three times a day  enoxaparin Injectable 40 milliGRAM(s) SubCutaneous daily  gabapentin 300 milliGRAM(s) Oral three times a day  lidocaine   Patch 1 Patch Transdermal daily  lidocaine   Patch 1 Patch Transdermal daily  methocarbamol 750 milliGRAM(s) Oral two times a day  nicotine -   7 mG/24Hr(s) Patch 1 patch Transdermal daily  senna 2 Tablet(s) Oral at bedtime    MEDICATIONS  (PRN):  ibuprofen  Tablet. 600 milliGRAM(s) Oral every 6 hours PRN Moderate Pain (4 - 6)  oxyCODONE    IR 5 milliGRAM(s) Oral once PRN Moderate Pain (4 - 6)  oxyCODONE    IR 10 milliGRAM(s) Oral every 6 hours PRN Severe Pain (7 - 10)  polyethylene glycol 3350 17 Gram(s) Oral daily PRN Constipation      Vital Signs Last 24 Hrs  T(C): 36.6 (18 Oct 2018 05:49), Max: 37 (17 Oct 2018 22:03)  T(F): 97.8 (18 Oct 2018 05:49), Max: 98.6 (17 Oct 2018 22:03)  HR: 56 (18 Oct 2018 05:49) (55 - 58)  BP: 145/86 (18 Oct 2018 05:49) (145/86 - 157/84)  BP(mean): --  RR: 18 (18 Oct 2018 05:49) (18 - 18)  SpO2: 99% (18 Oct 2018 05:49) (98% - 100%)      PHYSICAL EXAM  Gen: Alert, well-developed, NAD  HEENT: NCAT, EOMI, conjunctiva clear, sclera anicteric  Neck: Supple  CV: RRR, S1S2, no murmurs. 2+ DP/PT pulse on right foot.   Resp: CTAB anteriorly, nonlabored respiratory effort, no wheezing or rales  Abd: Soft, NT, ND, +BS  Ext: no peripheral edema. Intact ROM left leg. Right leg ROM is mildly limited compared to left leg, likely 2/2 pain/stiffness. Can wiggle right toes.  Neuro: no focal deficits otherwise  SKIN: no rash. Small punctate excoriations on right shin, but appears chronic and stable, not acute. Also several papular lesions on penile shaft near glans, painless, no erythema or obvious ulcer, no penile discharge, appear stable    CAPILLARY BLOOD GLUCOSE        I&O's Summary    17 Oct 2018 07:01  -  18 Oct 2018 07:00  --------------------------------------------------------  IN: 0 mL / OUT: 800 mL / NET: -800 mL        LABS:                        15.4   8.08  )-----------( 210      ( 17 Oct 2018 06:00 )             44.7     10-17    140  |  100  |  18  ----------------------------<  99  3.7   |  23  |  0.86    Ca    9.2      17 Oct 2018 06:00  Phos  5.0     10-17  Mg     2.1     10-17                RADIOLOGY & ADDITIONAL TESTS:   < from: MR Lumbar Spine w/wo IV Cont (10.17.18 @ 16:33) >    FINDINGS: There is mild loss of the normal lumbar lordosis. There is disc   desiccation at L2-3 through L4-5.    There is anterior spondylosis at L2-3 through L5-S1.    L1-2: Normal.    L2-3: Mild disc bulging. Bilateral facet and ligamentous degenerative   change. Moderate central canal and mild bilateral neural foraminal   stenosis.    L3-4: Circumferential disc bulging with bilateral facet and ligamentous   hypertrophic degenerative change. Severe central canal and moderate   bilateral neural foraminal stenosis.    L4-5: Circumferential disc bulging with a right parasagittal disc   extrusion extending superiorly into the right L4 recess where results in   mass effect upon the descending right L5 nerve root and compression of   the thecal sac. Bilateral facet and ligamentous hypertrophic changes   contribute to severe central canal stenosis. Underlying disc bulging   results in moderate bilateral neural foraminal stenosis. A small amount   of enhancing soft tissue surrounds extruded fragment likely representing   granulation tissue.    L5-S1: Circumferential disc bulging and bilateral facet arthropathy. A   mild bilateral neural foraminal stenosis.    The marrow signal is normal. The conus is within normal limits.    The overall diameter of the central canal small consistent with a   component of central spinal canal stenosis.    Impression:    Moderate central canal stenosis at 23.  Severe central canal stenosis at L3-4 and L4-5.  L4-5-1 right parasagittal extruded disc fragment extends superiorly   behind the                  JEANNETTE BECERRA M.D., ATTENDING RADIOLOGIST  This document has been electronically signed. Oct 17 2018  4:25PM    < end of copied text >    MICROBIOLOGY:    ANTIMICROBIALS:    CONSULTS:

## 2018-10-18 NOTE — CONSULT NOTE ADULT - ASSESSMENT
62M with chronic back pain presents with acute RLE radiculopathy, and difficulty ambulating.     ·	Multimodal pain control  ·	Decadron taper: 10q6x4 doses, 5,3,1  ·	PT/OT  ·	WBAT  ·	FU with Dr. Huddleston as an outpt in 1-2 weeks after discharge.   ·	No acute ortho intervention  ·	Please page back as needed if you have questions  ·	will discuss with attending and addend where necessary    Ortho [phone]

## 2018-10-18 NOTE — PROGRESS NOTE ADULT - PROBLEM SELECTOR PLAN 1
Differential includes neuropathy, radiculopathy (patient with history of "nerve damage" according to Neurologist), no signs of cord compression, no signs of vascular compromise.  - RLE punctate excoriations, less likely vesicular rash of varicella, D/brittany acyclovir  - History of chronic lower back and R>L LE shooting pain, numbness, tingling. Likely  2/2 severe central canal stenosis L3-5 based on MRI  - lumbosacral spine XR was negative for fracture or lytic lesion  - Pain control with lidoderm patch, gabapentin 300 mg TID, methocarbamol 750 mg BID, and oxycodone 10 prn for breakthrough pain  - Outpatient neurologist, Dr. Denton, recently moved to [facility] and no longer have records of the patient. PCP, Dr. Kyle King, also does not have MRI records. Patient reports MRI was done ~5 years ago.  - Doppler US negative for DVT  - CK level trending down from 739 at presentation to 414 currently  - Ortho following, recs appreciated. No acute intervention, follow up as outpatient, and decadron PO taper.

## 2018-10-24 ENCOUNTER — INPATIENT (INPATIENT)
Facility: HOSPITAL | Age: 63
LOS: 7 days | Discharge: NURSING, REHAB OR LONG-TERM CARE | End: 2018-11-01
Attending: INTERNAL MEDICINE | Admitting: INTERNAL MEDICINE
Payer: MEDICAID

## 2018-10-24 VITALS
HEART RATE: 56 BPM | SYSTOLIC BLOOD PRESSURE: 160 MMHG | DIASTOLIC BLOOD PRESSURE: 84 MMHG | TEMPERATURE: 98 F | OXYGEN SATURATION: 99 % | RESPIRATION RATE: 18 BRPM

## 2018-10-24 DIAGNOSIS — D72.829 ELEVATED WHITE BLOOD CELL COUNT, UNSPECIFIED: ICD-10-CM

## 2018-10-24 DIAGNOSIS — R82.998 OTHER ABNORMAL FINDINGS IN URINE: ICD-10-CM

## 2018-10-24 DIAGNOSIS — M25.9 JOINT DISORDER, UNSPECIFIED: ICD-10-CM

## 2018-10-24 DIAGNOSIS — R09.89 OTHER SPECIFIED SYMPTOMS AND SIGNS INVOLVING THE CIRCULATORY AND RESPIRATORY SYSTEMS: ICD-10-CM

## 2018-10-24 DIAGNOSIS — M48.061 SPINAL STENOSIS, LUMBAR REGION WITHOUT NEUROGENIC CLAUDICATION: ICD-10-CM

## 2018-10-24 DIAGNOSIS — R73.03 PREDIABETES: ICD-10-CM

## 2018-10-24 DIAGNOSIS — M54.9 DORSALGIA, UNSPECIFIED: ICD-10-CM

## 2018-10-24 DIAGNOSIS — R23.8 OTHER SKIN CHANGES: ICD-10-CM

## 2018-10-24 DIAGNOSIS — I10 ESSENTIAL (PRIMARY) HYPERTENSION: ICD-10-CM

## 2018-10-24 DIAGNOSIS — Z29.9 ENCOUNTER FOR PROPHYLACTIC MEASURES, UNSPECIFIED: ICD-10-CM

## 2018-10-24 DIAGNOSIS — E86.0 DEHYDRATION: ICD-10-CM

## 2018-10-24 DIAGNOSIS — R52 PAIN, UNSPECIFIED: ICD-10-CM

## 2018-10-24 LAB
ALBUMIN SERPL ELPH-MCNC: 4 G/DL — SIGNIFICANT CHANGE UP (ref 3.3–5)
ALP SERPL-CCNC: 55 U/L — SIGNIFICANT CHANGE UP (ref 40–120)
ALT FLD-CCNC: 35 U/L — SIGNIFICANT CHANGE UP (ref 4–41)
AST SERPL-CCNC: 16 U/L — SIGNIFICANT CHANGE UP (ref 4–40)
BASOPHILS # BLD AUTO: 0.02 K/UL — SIGNIFICANT CHANGE UP (ref 0–0.2)
BASOPHILS NFR BLD AUTO: 0.1 % — SIGNIFICANT CHANGE UP (ref 0–2)
BILIRUB SERPL-MCNC: 0.5 MG/DL — SIGNIFICANT CHANGE UP (ref 0.2–1.2)
BUN SERPL-MCNC: 25 MG/DL — HIGH (ref 7–23)
CALCIUM SERPL-MCNC: 9 MG/DL — SIGNIFICANT CHANGE UP (ref 8.4–10.5)
CHLORIDE SERPL-SCNC: 101 MMOL/L — SIGNIFICANT CHANGE UP (ref 98–107)
CO2 SERPL-SCNC: 23 MMOL/L — SIGNIFICANT CHANGE UP (ref 22–31)
CREAT SERPL-MCNC: 0.64 MG/DL — SIGNIFICANT CHANGE UP (ref 0.5–1.3)
EOSINOPHIL # BLD AUTO: 0.32 K/UL — SIGNIFICANT CHANGE UP (ref 0–0.5)
EOSINOPHIL NFR BLD AUTO: 2.4 % — SIGNIFICANT CHANGE UP (ref 0–6)
GLUCOSE SERPL-MCNC: 126 MG/DL — HIGH (ref 70–99)
HCT VFR BLD CALC: 46.1 % — SIGNIFICANT CHANGE UP (ref 39–50)
HGB BLD-MCNC: 16.1 G/DL — SIGNIFICANT CHANGE UP (ref 13–17)
IMM GRANULOCYTES # BLD AUTO: 0.09 # — SIGNIFICANT CHANGE UP
IMM GRANULOCYTES NFR BLD AUTO: 0.7 % — SIGNIFICANT CHANGE UP (ref 0–1.5)
LYMPHOCYTES # BLD AUTO: 36.1 % — SIGNIFICANT CHANGE UP (ref 13–44)
LYMPHOCYTES # BLD AUTO: 4.83 K/UL — HIGH (ref 1–3.3)
MCHC RBC-ENTMCNC: 31.6 PG — SIGNIFICANT CHANGE UP (ref 27–34)
MCHC RBC-ENTMCNC: 34.9 % — SIGNIFICANT CHANGE UP (ref 32–36)
MCV RBC AUTO: 90.4 FL — SIGNIFICANT CHANGE UP (ref 80–100)
MONOCYTES # BLD AUTO: 1.24 K/UL — HIGH (ref 0–0.9)
MONOCYTES NFR BLD AUTO: 9.3 % — SIGNIFICANT CHANGE UP (ref 2–14)
NEUTROPHILS # BLD AUTO: 6.88 K/UL — SIGNIFICANT CHANGE UP (ref 1.8–7.4)
NEUTROPHILS NFR BLD AUTO: 51.4 % — SIGNIFICANT CHANGE UP (ref 43–77)
NRBC # FLD: 0 — SIGNIFICANT CHANGE UP
PLATELET # BLD AUTO: 254 K/UL — SIGNIFICANT CHANGE UP (ref 150–400)
PMV BLD: 11.5 FL — SIGNIFICANT CHANGE UP (ref 7–13)
POTASSIUM SERPL-MCNC: 3.7 MMOL/L — SIGNIFICANT CHANGE UP (ref 3.5–5.3)
POTASSIUM SERPL-SCNC: 3.7 MMOL/L — SIGNIFICANT CHANGE UP (ref 3.5–5.3)
PROT SERPL-MCNC: 6.9 G/DL — SIGNIFICANT CHANGE UP (ref 6–8.3)
RBC # BLD: 5.1 M/UL — SIGNIFICANT CHANGE UP (ref 4.2–5.8)
RBC # FLD: 11.5 % — SIGNIFICANT CHANGE UP (ref 10.3–14.5)
SODIUM SERPL-SCNC: 139 MMOL/L — SIGNIFICANT CHANGE UP (ref 135–145)
WBC # BLD: 13.38 K/UL — HIGH (ref 3.8–10.5)
WBC # FLD AUTO: 13.38 K/UL — HIGH (ref 3.8–10.5)

## 2018-10-24 PROCEDURE — 71045 X-RAY EXAM CHEST 1 VIEW: CPT | Mod: 26

## 2018-10-24 PROCEDURE — 93010 ELECTROCARDIOGRAM REPORT: CPT

## 2018-10-24 PROCEDURE — 99223 1ST HOSP IP/OBS HIGH 75: CPT | Mod: GC

## 2018-10-24 PROCEDURE — 73610 X-RAY EXAM OF ANKLE: CPT | Mod: 26,RT

## 2018-10-24 RX ORDER — OXYCODONE HYDROCHLORIDE 5 MG/1
10 TABLET ORAL EVERY 4 HOURS
Qty: 0 | Refills: 0 | Status: DISCONTINUED | OUTPATIENT
Start: 2018-10-24 | End: 2018-10-24

## 2018-10-24 RX ORDER — LIDOCAINE 4 G/100G
1 CREAM TOPICAL DAILY
Qty: 0 | Refills: 0 | Status: DISCONTINUED | OUTPATIENT
Start: 2018-10-24 | End: 2018-10-27

## 2018-10-24 RX ORDER — NICOTINE POLACRILEX 2 MG
1 GUM BUCCAL DAILY
Qty: 0 | Refills: 0 | Status: DISCONTINUED | OUTPATIENT
Start: 2018-10-24 | End: 2018-10-25

## 2018-10-24 RX ORDER — HYDROMORPHONE HYDROCHLORIDE 2 MG/ML
1 INJECTION INTRAMUSCULAR; INTRAVENOUS; SUBCUTANEOUS EVERY 6 HOURS
Qty: 0 | Refills: 0 | Status: DISCONTINUED | OUTPATIENT
Start: 2018-10-24 | End: 2018-10-25

## 2018-10-24 RX ORDER — GABAPENTIN 400 MG/1
300 CAPSULE ORAL THREE TIMES A DAY
Qty: 0 | Refills: 0 | Status: DISCONTINUED | OUTPATIENT
Start: 2018-10-24 | End: 2018-10-31

## 2018-10-24 RX ORDER — POLYETHYLENE GLYCOL 3350 17 G/17G
17 POWDER, FOR SOLUTION ORAL DAILY
Qty: 0 | Refills: 0 | Status: DISCONTINUED | OUTPATIENT
Start: 2018-10-24 | End: 2018-11-01

## 2018-10-24 RX ORDER — OXYCODONE HYDROCHLORIDE 5 MG/1
5 TABLET ORAL EVERY 4 HOURS
Qty: 0 | Refills: 0 | Status: DISCONTINUED | OUTPATIENT
Start: 2018-10-24 | End: 2018-10-24

## 2018-10-24 RX ORDER — OXYCODONE HYDROCHLORIDE 5 MG/1
5 TABLET ORAL EVERY 6 HOURS
Qty: 0 | Refills: 0 | Status: DISCONTINUED | OUTPATIENT
Start: 2018-10-24 | End: 2018-10-24

## 2018-10-24 RX ORDER — SENNA PLUS 8.6 MG/1
2 TABLET ORAL AT BEDTIME
Qty: 0 | Refills: 0 | Status: DISCONTINUED | OUTPATIENT
Start: 2018-10-24 | End: 2018-11-01

## 2018-10-24 RX ORDER — OXYCODONE HYDROCHLORIDE 5 MG/1
10 TABLET ORAL EVERY 12 HOURS
Qty: 0 | Refills: 0 | Status: DISCONTINUED | OUTPATIENT
Start: 2018-10-24 | End: 2018-10-25

## 2018-10-24 RX ORDER — OXYCODONE HYDROCHLORIDE 5 MG/1
10 TABLET ORAL EVERY 6 HOURS
Qty: 0 | Refills: 0 | Status: DISCONTINUED | OUTPATIENT
Start: 2018-10-24 | End: 2018-10-24

## 2018-10-24 RX ORDER — DOCUSATE SODIUM 100 MG
100 CAPSULE ORAL THREE TIMES A DAY
Qty: 0 | Refills: 0 | Status: DISCONTINUED | OUTPATIENT
Start: 2018-10-24 | End: 2018-11-01

## 2018-10-24 RX ORDER — LIDOCAINE 4 G/100G
1 CREAM TOPICAL ONCE
Qty: 0 | Refills: 0 | Status: COMPLETED | OUTPATIENT
Start: 2018-10-24 | End: 2018-10-24

## 2018-10-24 RX ORDER — SODIUM CHLORIDE 9 MG/ML
1000 INJECTION, SOLUTION INTRAVENOUS
Qty: 0 | Refills: 0 | Status: DISCONTINUED | OUTPATIENT
Start: 2018-10-24 | End: 2018-10-25

## 2018-10-24 RX ORDER — IBUPROFEN 200 MG
600 TABLET ORAL
Qty: 0 | Refills: 0 | Status: DISCONTINUED | OUTPATIENT
Start: 2018-10-24 | End: 2018-10-30

## 2018-10-24 RX ORDER — METHOCARBAMOL 500 MG/1
750 TABLET, FILM COATED ORAL
Qty: 0 | Refills: 0 | Status: DISCONTINUED | OUTPATIENT
Start: 2018-10-24 | End: 2018-10-25

## 2018-10-24 RX ORDER — ENOXAPARIN SODIUM 100 MG/ML
40 INJECTION SUBCUTANEOUS EVERY 24 HOURS
Qty: 0 | Refills: 0 | Status: DISCONTINUED | OUTPATIENT
Start: 2018-10-24 | End: 2018-11-01

## 2018-10-24 RX ORDER — AMLODIPINE BESYLATE 2.5 MG/1
10 TABLET ORAL DAILY
Qty: 0 | Refills: 0 | Status: DISCONTINUED | OUTPATIENT
Start: 2018-10-24 | End: 2018-11-01

## 2018-10-24 RX ORDER — OXYCODONE HYDROCHLORIDE 5 MG/1
5 TABLET ORAL ONCE
Qty: 0 | Refills: 0 | Status: DISCONTINUED | OUTPATIENT
Start: 2018-10-24 | End: 2018-10-24

## 2018-10-24 RX ADMIN — SENNA PLUS 2 TABLET(S): 8.6 TABLET ORAL at 21:19

## 2018-10-24 RX ADMIN — Medication 1 PATCH: at 21:19

## 2018-10-24 RX ADMIN — Medication 100 MILLIGRAM(S): at 21:18

## 2018-10-24 RX ADMIN — SODIUM CHLORIDE 75 MILLILITER(S): 9 INJECTION, SOLUTION INTRAVENOUS at 23:35

## 2018-10-24 RX ADMIN — OXYCODONE HYDROCHLORIDE 10 MILLIGRAM(S): 5 TABLET ORAL at 22:30

## 2018-10-24 RX ADMIN — GABAPENTIN 300 MILLIGRAM(S): 400 CAPSULE ORAL at 21:19

## 2018-10-24 RX ADMIN — OXYCODONE HYDROCHLORIDE 10 MILLIGRAM(S): 5 TABLET ORAL at 21:19

## 2018-10-24 RX ADMIN — LIDOCAINE 1 PATCH: 4 CREAM TOPICAL at 21:21

## 2018-10-24 RX ADMIN — OXYCODONE HYDROCHLORIDE 5 MILLIGRAM(S): 5 TABLET ORAL at 17:31

## 2018-10-24 NOTE — ED PROVIDER NOTE - MEDICAL DECISION MAKING DETAILS
61 y/o male pt with worsening chronic back pain ,unable to take care of self at home , reports being bed bound due to pain , sent in by [facility] for admission , will admit for placement

## 2018-10-24 NOTE — H&P ADULT - PROBLEM SELECTOR PLAN 3
Continue amlodipine 10 daily Elevated BUN:Cr ratio suggestive of dehydration  - Plan to provide IVF overnight  - Recheck in AM Elevated BUN: Cr ratio >20 suggestive of dehydration  - Plan to provide IVF overnight  - Recheck in AM

## 2018-10-24 NOTE — H&P ADULT - LYMPHATIC
supraclavicular R/posterior cervical R/anterior cervical R/supraclavicular L/posterior cervical L/anterior cervical L

## 2018-10-24 NOTE — H&P ADULT - PROBLEM SELECTOR PLAN 7
DVT ppx: DVT ppx: SubQ lovenox  Consistent carbohydrate diet    Summer Hernandez MD  Internal Medicine, PGY-2  Pager [phone] Presents with pain of R foot w/ leg bullae   - X-ray preliminary findings without acute fractures  - Unclear if bullae present from recent fall  - Consider dermatology consult if more bullae appear during admission (suggestive of systemic process) however appears isolated, without associated tenderness or warmth - Unclear if bullae present from recent fall  - Consider dermatology consult if more bullae appear during admission (suggestive of systemic process) however appears isolated, without associated tenderness or warmth   -Monitor

## 2018-10-24 NOTE — H&P ADULT - PROBLEM SELECTOR PLAN 10
DVT ppx: SubQ lovenox  Consistent carbohydrate diet    Summer Hernandez MD  Internal Medicine, PGY-2  Pager [phone] DVT ppx: SubQ lovenox  carbohydrate diet    Summer Hernandez MD  Internal Medicine, PGY-2  Pager [phone] DVT ppx: SubQ lovenox  carbohydrate diet  Nicotine patch started  Tobacco cessation counseling provided at bedside      Summer Hernandez MD  Internal Medicine, PGY-2  Pager [phone]

## 2018-10-24 NOTE — H&P ADULT - PROBLEM SELECTOR PLAN 8
Patient and mother report dark urine  - Plan for UA, no symptoms to suggest UTI Continue amlodipine 10 daily

## 2018-10-24 NOTE — H&P ADULT - NSHPLABSRESULTS_GEN_ALL_CORE
16.1   13.38 )-----------( 254      ( 24 Oct 2018 17:10 )             46.1     CBC Full  -  ( 24 Oct 2018 17:10 )  WBC Count : 13.38 K/uL  Hemoglobin : 16.1 g/dL  Hematocrit : 46.1 %  Platelet Count - Automated : 254 K/uL  Mean Cell Volume : 90.4 fL  Mean Cell Hemoglobin : 31.6 pg  Mean Cell Hemoglobin Concentration : 34.9 %  Auto Neutrophil # : 6.88 K/uL  Auto Lymphocyte # : 4.83 K/uL  Auto Monocyte # : 1.24 K/uL  Auto Eosinophil # : 0.32 K/uL  Auto Basophil # : 0.02 K/uL  Auto Neutrophil % : 51.4 %  Auto Lymphocyte % : 36.1 %  Auto Monocyte % : 9.3 %  Auto Eosinophil % : 2.4 %  Auto Basophil % : 0.1 %    10-24    139  |  101  |  25<H>  ----------------------------<  126<H>  3.7   |  23  |  0.64    Ca    9.0      24 Oct 2018 17:30    TPro  6.9  /  Alb  4.0  /  TBili  0.5  /  DBili  x   /  AST  16  /  ALT  35  /  AlkPhos  55  10-24      R xray of foot - no acute fractures EKG, 10/24, Sinus bradycardia, 45bpm, qtc 408, no acute Tw or ST changes - my reading     10-24    139  |  101  |  25<H>  ----------------------------<  126<H>  3.7   |  23  |  0.64    Ca    9.0      24 Oct 2018 17:30    TPro  6.9  /  Alb  4.0  /  TBili  0.5  /  DBili  x   /  AST  16  /  ALT  35  /  AlkPhos  55  10-24      R xray of foot - no acute fractures EKG, 10/24, Sinus bradycardia, 45bpm, qtc 408, no acute Tw or ST changes - my reading     10-24    139  |  101  |  25<H>  ----------------------------<  126<H>  3.7   |  23  |  0.64    Ca    9.0      24 Oct 2018 17:30    TPro  6.9  /  Alb  4.0  /  TBili  0.5  /  DBili  x   /  AST  16  /  ALT  35  /  AlkPhos  55  10-24      R xray of foot - no acute fractures      CXR - clear lungs, no pleural effusions - my reading

## 2018-10-24 NOTE — ED ADULT TRIAGE NOTE - CHIEF COMPLAINT QUOTE
Patient brought to Er from home by EMS for c/o lower back pain radiating to legs for the past 2 weeks. Pt was prescribed Neurontin with no relief.

## 2018-10-24 NOTE — ED ADULT NURSE NOTE - OBJECTIVE STATEMENT
received pt in wheelchair A and Ox 3 in NAD, pt reports " I have chronic back pain and nothing helps, I took medicine they prescribed but it does not help either". pt is visibly uncomfortable, orders noted and completed.

## 2018-10-24 NOTE — ED PROVIDER NOTE - OBJECTIVE STATEMENT
61 y/o M presents to ED c/o 63 y/o M presents to ED c/o chronic back pain x6 days. Pt was discharged from hospital and was still in pain.  Pain has been worsening, with difficulty moving in the morning. Leg has waxing and waning pain,  can barely stand up , has blister on upper leg area. Had medication at 10/12 in morning ibuprofen, MET ? , Gabapentin. Suppose to have FU with  (spine) surgeon evaluation.  Nurses seen monday/friday left instructions to see a doctor , suggested he should be admitted back to ED.

## 2018-10-24 NOTE — ED ADULT NURSE NOTE - NSIMPLEMENTINTERV_GEN_ALL_ED
Implemented All Universal Safety Interventions:  [place] to call system. Call bell, personal items and telephone within reach. Instruct patient to call for assistance. Room bathroom lighting operational. Non-slip footwear when patient is off stretcher. Physically safe environment: no spills, clutter or unnecessary equipment. Stretcher in lowest position, wheels locked, appropriate side rails in place.

## 2018-10-24 NOTE — H&P ADULT - ASSESSMENT
63 yo M PMHx T2DM, hearing impairment, chronic back pain and bilateral lower extremity pain in setting of recently diagnosed severe central canal stenosis at L3-L5, who presents with worsening back pain likely in setting of severe lumbar spinal stenosis. 63 yo Male with MHx of Type 2 DM, hearing impairment, chronic back pain and bilateral lower extremity pain in setting of recently diagnosed severe central canal stenosis at L3-L5 a/w acute on chronic lower back pain c/b difficulty walking, falls and b/l LE weakness, Rt>Lt and urinary hesitancy; Found to be dehydrated and to have leukocytosis; Also asymptomatic sinus bradycardia, opioid induced constipation;

## 2018-10-24 NOTE — H&P ADULT - NSHPSOCIALHISTORY_GEN_ALL_CORE
Has cut back on smoking as consequence of being in hospital. Previously smoked 1.5 packs, now down to 3 cigs a day. Denies alcohol or illicit drug use. Retired. Has cut back on smoking as consequence of being in hospital. Previously smoked 1.5 packs, now down to 3 cigs a day. Denies alcohol or illicit drug use. Retired ;

## 2018-10-24 NOTE — H&P ADULT - NSHPPHYSICALEXAM_GEN_ALL_CORE
Vital Signs Last 24 Hrs  T(C): 37.2 (24 Oct 2018 20:48), Max: 37.2 (24 Oct 2018 20:48)  T(F): 99 (24 Oct 2018 20:48), Max: 99 (24 Oct 2018 20:48)  HR: 56 (24 Oct 2018 19:08) (56 - 56)  BP: 127/73 (24 Oct 2018 20:48) (121/71 - 160/84)  BP(mean): --  RR: 18 (24 Oct 2018 20:48) (18 - 18)  SpO2: 99% (24 Oct 2018 20:48) (96% - 99%)  I&O's Summary      General: Appears uncomfortable otherwise in no acute distress  Head: Atraumatic, normocephalic  Eyes: EOMI, small pupils  ENMT: Moist mucous membranes  Chest/lung: Normal appearance, no accessory muscle use, speaking full sentences, clear to auscultation bilaterally anteriorly, R basilar rales on exam  Cardio: Regular rate and rhythm, no murmurs, soft cardiac sounds  Abdomen: Soft, nontender, mildly distended, no guarding or rigidity  : No lentz catheter  Extremities: No edema, bullae (2 cm) on anterior shin, no erythema or point tenderness, R foot with tenderness to palpation  Neuro: Answering questions appropriately, saddle sensation intact, 5/5 L leg raise, 4+/5 on R (limited by pain), able to plantar and dorsiflex feet bilaterally, no focal spine point tenderness, no warmth/erythema of spine or surrounding musculature, hard of hearing  Psych: Appropriate, pleasant affect  Skin: Bullae on leg Vital Signs Last 24 Hrs  T(C): 37.2 (24 Oct 2018 20:48), Max: 37.2 (24 Oct 2018 20:48)  T(F): 99 (24 Oct 2018 20:48), Max: 99 (24 Oct 2018 20:48)  HR: 56 (24 Oct 2018 19:08) (56 - 56)  BP: 127/73 (24 Oct 2018 20:48) (121/71 - 160/84)  BP(mean): --  RR: 18 (24 Oct 2018 20:48) (18 - 18)  SpO2: 99% (24 Oct 2018 20:48) (96% - 99%)  I&O's Summary      General: Appears uncomfortable otherwise in no acute distress  Head: Atraumatic, normocephalic  Eyes: EOMI, small pupils  ENMT: Moist mucous membranes  Chest/lung: Normal appearance, no accessory muscle use, speaking full sentences, clear to auscultation bilaterally anteriorly  Cardio: Regular rate and rhythm, no murmurs, soft cardiac sounds  Abdomen: Soft, nontender, mildly distended, no guarding or rigidity  : No lentz catheter  Extremities: No edema, no erythema or point tenderness, Rt foot with tenderness to palpation  Neuro: Answering questions appropriately, saddle sensation intact, 5/5 L leg raise, 4+/5 on R (limited by pain), able to plantar and dorsiflex feet bilaterally, no focal spine point tenderness, no warmth/erythema of spine or surrounding musculature, hard of hearing  Psych: Appropriate, pleasant affect  Skin: Bullae on leg, one bullae (1.5cm x 1.5cm) on anterior shin

## 2018-10-24 NOTE — H&P ADULT - PROBLEM SELECTOR PLAN 9
Likely atelectasis given pain lower back/leg pain (likely not moving)  - CXR to further evaluate Likely atelectasis given pain lower back/leg pain (likely not moving)  - CXR - clear lungs - my reading  - f/u official CXR report

## 2018-10-24 NOTE — H&P ADULT - PROBLEM SELECTOR PLAN 6
Likely atelectasis given pain lower back/leg pain (likely not moving)  - CXR to further evaluate Continue amlodipine 10 daily Last Hga1c was 6.0 suggestive of pre-DM  - Consistent carbohydrate diet  - Can hold SSI

## 2018-10-24 NOTE — H&P ADULT - PROBLEM SELECTOR PLAN 4
Presents with pain of R foot w/ leg bullae   - X-ray preliminary findings without acute fractures  - Unclear if bullae present from recent fall  - Consider dermatology consult if more bullae appear during admission (suggestive of systemic process) however appears isolated, without associated tenderness or warmth Likely reactive in setting of pain and recent steroid use  - Repeat CBC in AM  - No fever, tachycardia to suggest infectious source Likely reactive in setting of pain and recent steroid use; No clinical evidence of infection; UA not suggestive of UTI;   - Repeat CBC in AM  - No fever, tachycardia to suggest infectious source

## 2018-10-24 NOTE — H&P ADULT - MOTOR
grossly 5/5 flexion-extension b/l UE and 4/5 RLE and 5/5 LLE  able to perform 30 deg or LLE and RLE lift of bed due to severe pain

## 2018-10-24 NOTE — ED PROVIDER NOTE - PROGRESS NOTE DETAILS
MARYSOL Jon: Received signout and discussed plan with QUID attending. Xrays negative, pain well controlled. Stable for admission for rehab placement and pain control. Pt amenable with plan.

## 2018-10-24 NOTE — ED PROVIDER NOTE - EXTREMITY EXAM
right shin blister, vesicle with clear fluid , no surrounding erthymea , right medial mallelous tender , no deformities/right upper extremity findings

## 2018-10-24 NOTE — H&P ADULT - HISTORY OF PRESENT ILLNESS
61 yo M PMHx T2DM, hearing impairment, chronic back pain and bilateral lower extremity pain in setting of recently diagnosed severe central canal stenosis at L3-L5, who presents with worsening back pain       Brought in by EMS for lower back pain radiating to legs for 2 weeks    Tried gabapentin without relief  Has had difficutly with moving  waxing/waning pain  Can barely stand up per ED note    F/u w/ ortho Dr. Luis     Recently hospitalized from Oct 13-Oct 18th. He had a duplex which was negative for DVT, regimen for pain included gabapentin, liodcaine patches, methocarbamol and oxycodone 10 mg Q6, amlodipine was started    penile papular lesion  MRI with severe central canal stenosis L3-4, L4-5  Evaluated by ortho, methylprednisolone taper with plan to follow-up w/ ortho (Dr. Huddleston) and d/c on home PT w/o opiates      While in ED: T97.8, 56, 121/71, 18, 96% RA. He was given oxycodone 5 mg. An x-ray of the R ankle had no acute fracture. 63 yo M PMHx T2DM, hearing impairment, chronic back pain and bilateral lower extremity pain in setting of recently diagnosed severe central canal stenosis at L3-L5, who presents with worsening back pain     He has had chronic back pain for years but it has become progressively worse. He was recently hospitalized on Oct 13th-Oct 18th and was discharged w/ physical therapy. He felt on discharge that his pain was not adequately controlled. After his discharge he has not been able to participate with PT and his pain has become worse. He attempted to complete his steroid taper, however he was not sure what he needed and still has remaining pills.     He describes his pain as a heaviness and stiffness. It starts from right side of his lower back to both his knees and down towards his feet. He has tried walking with his walker, however he has fallen three times since his discharge (he denies loss of consciousness or trauma to the head). He was able to catch himself, and it was in the setting of trying to climb the stairs. He wakes up with 10/10 pain. His medications and heat has helped with the pain. Sitting on the toilet has made the pain worse and he has felt constipated. He has noticed recent urinary hesitancy and sometimes he cannot reach the restroom. This has been going on for the last week since he was at the hospital. He denies urinary frequency or dysuria, however his mother notes that his urine is brown/dark. He has not had fecal incontinence. Denied saddle anesthesia.     Of note he had transient chest pain while in bed lasting a few seconds. He has also had significant R foot pain and a new bullae without explanation on his leg. He has not been able to follow-up with orthopedic surgery. He feels that this pain is worse. He has also noticed sweats, but no fever or chills.     While in ED: T97.8, 56, 121/71, 18, 96% RA. He was given oxycodone 5 mg. An x-ray of the R ankle had no acute fracture. 61 yo Male with MHx of Type 2 DM, hearing impairment, chronic back pain and bilateral lower extremity pain in setting of recently diagnosed severe central canal stenosis at L3-L5, who presents with worsening back pain     He has had chronic back pain for years but it has become progressively worse. He was recently hospitalized on Oct 13th-Oct 18th and was discharged w/ physical therapy. He felt on discharge that his pain was not adequately controlled. After his discharge he has not been able to participate with PT and his pain has become worse. He attempted to complete his steroid taper, however he was not sure what he needed and still has remaining pills.     He describes his pain as a heaviness and stiffness. It starts from right side of his lower back to both his knees and down towards his feet. He has tried walking with his walker, however he has fallen three times since his discharge (he denies loss of consciousness or trauma to the head). He was able to catch himself, and it was in the setting of trying to climb the stairs. He wakes up with 10/10 pain. His medications and heat has helped with the pain. Sitting on the toilet has made the pain worse and he has felt constipated. He has noticed recent urinary hesitancy and sometimes he cannot reach the restroom. This has been going on for the last week since he was at the hospital. He denies urinary frequency or dysuria, however his mother notes that his urine is brown/dark. He has not had fecal incontinence. Denied saddle anesthesia.     Of note he had transient chest pain while in bed lasting a few seconds. He has also had significant R foot pain and a new bullae without explanation on his leg. He has not been able to follow-up with orthopedic surgery. He feels that this pain is worse. He has also noticed sweats, but no fever or chills.     While in ED: T97.8, 56, 121/71, 18, 96% RA. He was given oxycodone 5 mg. An x-ray of the R ankle had no acute fracture. 61 yo Male with MHx of Type 2 DM, hearing impairment, chronic back pain and bilateral lower extremity pain in setting of recently diagnosed severe central canal stenosis at L3-L5, who presents with worsening back pain.   Reports chronic back pain for "years" which has become progressively worse. He was recently hospitalized on Oct 13th-Oct 18th and was discharged w/ physical therapy. He felt on discharge that his pain was not adequately controlled. After his discharge he has not been able to participate with PT and his pain has become worse. He attempted to complete his steroid taper, however he was not sure what he needed and still has remaining pills. Describes his pain as a "heaviness and stiffness. It starts from right side of his lower back to both his knees and down towards his feet. He has tried walking with his walker, however he has fallen three times since his discharge (he denies loss of consciousness or trauma to the head). He was able to "catch" himself, and it was in the setting of trying to climb the stairs. He wakes up with 10/10 pain. His medications and heat has helped with the pain. Sitting on the toilet has made the pain worse and he has felt constipated. He has noticed recent urinary hesitancy and sometimes he cannot reach the restroom. This has been going on for the last week since he was at the hospital. He denies urinary frequency or dysuria, however his mother notes that his urine is brown/dark. He has not had fecal incontinence. Denied saddle anesthesia.   Of note he had transient chest pain while in bed lasting a few seconds. He has also had significant R foot pain and a new (one) bullae "without explanation" on his leg. He has not been able to follow-up with orthopedic surgery as the pain is worse. He has also noticed sweats, but no fever or chills. Reports no medical or surgical FHx of spinal problems in parents or siblings.     While in ED: T97.8, 56, 121/71, 18, 96% RA. He was given oxycodone 5 mg. An x-ray of the R ankle had no acute fracture. 63 yo Male, smoker, with MHx of Type 2 DM, hearing impairment, chronic back pain and bilateral lower extremity pain in setting of recently diagnosed severe central canal stenosis at L3-L5, who presents with worsening back pain.   Reports chronic back pain for "years" which has become progressively worse. He was recently hospitalized on Oct 13th-Oct 18th and was discharged w/ physical therapy. He felt on discharge that his pain was not adequately controlled. After his discharge he has not been able to participate with PT and his pain has become worse. He attempted to complete his steroid taper, however he was not sure what he needed and still has remaining pills. Describes his pain as a "heaviness and stiffness. It starts from right side of his lower back to both his knees and down towards his feet. He has tried walking with his walker, however he has fallen three times since his discharge (he denies loss of consciousness or trauma to the head). He was able to "catch" himself, and it was in the setting of trying to climb the stairs. He wakes up with 10/10 pain. His medications and heat has helped with the pain. Sitting on the toilet has made the pain worse and he has felt constipated. He has noticed recent urinary hesitancy and sometimes he cannot reach the restroom. This has been going on for the last week since he was at the hospital. He denies urinary frequency or dysuria, however his mother notes that his urine is brown/dark. He has not had fecal incontinence. Denied saddle anesthesia.   Of note he had transient chest pain while in bed lasting a few seconds. He has also had significant R foot pain and a new (one) bullae "without explanation" on his leg. He has not been able to follow-up with orthopedic surgery as the pain is worse. He has also noticed sweats, but no fever or chills. Reports no medical or surgical FHx of spinal problems in parents or siblings.     While in ED: T97.8, 56, 121/71, 18, 96% RA. He was given oxycodone 5 mg. An x-ray of the R ankle had no acute fracture.

## 2018-10-24 NOTE — H&P ADULT - PROBLEM SELECTOR PLAN 5
Patient and mother report dark urine  - Plan for UA, no symptoms to suggest UTI Last Hga1c was 6.0 suggestive of pre-DM  - Consistent carbohydrate diet  - Can hold SSI Presents with pain of Rt foot of unclear etiology   - X-ray preliminary findings without acute fractures  - f/u official report   - PT eval  - Pain control

## 2018-10-24 NOTE — H&P ADULT - PROBLEM SELECTOR PLAN 1
Has known severe spinal stenosis on recent MRI from last admission. Reports worsening pain, with urinary hesitancy, difficulty holding urine before reaching bathroom, and constipation.   - Consider repeat MRI  - Orthopedic surgery consult in AM  - Pain control with oxycodone 5 mg (moderate), 10 mg (severe), ibuprofen, lidocaine patches, gabapentin 300 TID, and methocarbamol Has known severe spinal stenosis on recent MRI from last admission 10/17. Reports worsening pain, with urinary hesitancy, difficulty holding urine before reaching bathroom, and constipation. Does not appear to have saddle anesthesia on exam.   - Last MRI with severe central canal stenosis at L3-4 and L4-5. L4-5-1 Right parasagittal extruded disc fragment. Circumferential disc bulging with a right parasagittal disc extrusion extending superiorly into the right L4 recess where results in mass effect upon the descending right L5 nerve root and compression of   the thecal sac.   - Consider repeat MRI  - Orthopedic surgery consult in AM  - Pain control with oxycodone 5 mg (moderate), 10 mg (severe), ibuprofen, lidocaine patches, gabapentin 300 TID, and methocarbamol Has known severe spinal stenosis on recent MRI from last admission 10/17. Reports worsening pain, with urinary hesitancy, difficulty holding urine before reaching bathroom, and constipation. Does not appear to have saddle anesthesia on exam.   - Last MRI with severe central canal stenosis at L3-4 and L4-5. L4-5-1 Right parasagittal extruded disc fragment. Circumferential disc bulging with a right parasagittal disc extrusion extending superiorly into the right L4 recess where results in mass effect upon the descending right L5 nerve root and compression of   the thecal sac.   - Consider repeat MRI  - Orthopedic surgery consult in AM  - Pain control with oxycodone ER 15 BID, and dilaudid 1 IVP Q6 for breakthrough pain.   - Ibuprofen, lidocaine patches, gabapentin 300 TID, and methocarbamol  - Avoiding morphine due to concerns of symptomatic bradycardia Has known severe spinal stenosis on recent MRI from last admission 10/17. Reports worsening pain, b/l LE weakness (Rt>Lt), difficulty walking, urinary hesitancy and difficulty holding urine before reaching bathroom; Does not appear to have saddle anesthesia on exam.   - Last MRI with severe central canal stenosis at L3-4 and L4-5. L4-5-1 Right parasagittal extruded disc fragment. Circumferential disc bulging with a right parasagittal disc extrusion extending superiorly into the right L4 recess where results in mass effect upon the descending right L5 nerve root and compression of   the thecal sac.   - Consider repeat MRI  - Orthopedic surgery consult in AM  - Restart Dexamethasone IV (regimen to be further discussed with Ortho Sx by the primary team)  - Pain control with oxycodone ER 15 BID, and Dilaudid 1 IVP Q6 for breakthrough pain.   - Ibuprofen, lidocaine patches, gabapentin 300 TID, and methocarbamol  - Avoiding morphine due to concerns of bradycardia

## 2018-10-24 NOTE — H&P ADULT - PROBLEM SELECTOR PLAN 2
Last Hga1c was 6.0 suggestive of pre-DM  - Consistent carbohydrate diet  - Can hold SSI Has uncontrolled pain.   - Pain control with oxycodone ER 15 BID, and dilaudid 1 IVP Q6 for breakthrough pain.   - Ibuprofen, lidocaine patches, gabapentin 300 TID, and methocarbamol  - Avoiding morphine due to concerns of symptomatic bradycardia  - Pending ECG   - Will need Q4 VS due to concerns of bradycardia Has uncontrolled pain requiring IV pain control;  - Start oxycodone ER 10mg BID, and Dilaudid 1 IVP Q6 for breakthrough pain.   - Ibuprofen, lidocaine patches, gabapentin 300 TID, and methocarbamol  - Avoiding morphine due to concerns of symptomatic bradycardia  -  ECG - sinus bradycardia   - Will need Q4H VS due to concerns of bradycardia with opioids  - bowel regimen for opioid induced constipation;

## 2018-10-24 NOTE — H&P ADULT - NSHPREVIEWOFSYSTEMS_GEN_ALL_CORE
CONSTITUTIONAL: No fever, chills, +diaphoresis  EYES: No visual changes   ENMT: Has difficulty hearing  RESPIRATORY: No shortness of breath. No cough  CARDIOVASCULAR: No chest pain or palpitations. Has felt that he has lower extremity swelling   GASTROINTESTINAL: Mild abdominal pain. + nausea + constipation.   GENITOURINARY: No dysuria, frequency, has had incontinence and dark urine  NEUROLOGICAL: Mild headaches + numbness  SKIN: Has noticed a bullae on his right leg  EXTREMITIES: Has had R foot pain CONSTITUTIONAL: No fever or chills, +diaphoresis  EYES: No visual changes   ENMT: Has difficulty hearing  RESPIRATORY: No shortness of breath. No cough  CARDIOVASCULAR: No chest pain or palpitations.  GASTROINTESTINAL: + nausea + constipation.   GENITOURINARY: No dysuria, frequency, has had incontinence and dark urine  SKIN: Has noticed a bullae on his right leg  EXTREMITIES: Has had R foot pain

## 2018-10-25 DIAGNOSIS — M54.9 DORSALGIA, UNSPECIFIED: ICD-10-CM

## 2018-10-25 DIAGNOSIS — R00.1 BRADYCARDIA, UNSPECIFIED: ICD-10-CM

## 2018-10-25 DIAGNOSIS — M79.671 PAIN IN RIGHT FOOT: ICD-10-CM

## 2018-10-25 PROBLEM — E11.9 TYPE 2 DIABETES MELLITUS WITHOUT COMPLICATIONS: Chronic | Status: INACTIVE | Noted: 2018-10-13 | Resolved: 2018-10-24

## 2018-10-25 LAB
APPEARANCE UR: CLEAR — SIGNIFICANT CHANGE UP
BASOPHILS # BLD AUTO: 0.01 K/UL — SIGNIFICANT CHANGE UP (ref 0–0.2)
BASOPHILS NFR BLD AUTO: 0.1 % — SIGNIFICANT CHANGE UP (ref 0–2)
BILIRUB UR-MCNC: NEGATIVE — SIGNIFICANT CHANGE UP
BLOOD UR QL VISUAL: NEGATIVE — SIGNIFICANT CHANGE UP
BUN SERPL-MCNC: 23 MG/DL — SIGNIFICANT CHANGE UP (ref 7–23)
CALCIUM SERPL-MCNC: 8.9 MG/DL — SIGNIFICANT CHANGE UP (ref 8.4–10.5)
CHLORIDE SERPL-SCNC: 101 MMOL/L — SIGNIFICANT CHANGE UP (ref 98–107)
CO2 SERPL-SCNC: 24 MMOL/L — SIGNIFICANT CHANGE UP (ref 22–31)
COLOR SPEC: YELLOW — SIGNIFICANT CHANGE UP
CREAT SERPL-MCNC: 0.67 MG/DL — SIGNIFICANT CHANGE UP (ref 0.5–1.3)
EOSINOPHIL # BLD AUTO: 0.07 K/UL — SIGNIFICANT CHANGE UP (ref 0–0.5)
EOSINOPHIL NFR BLD AUTO: 0.5 % — SIGNIFICANT CHANGE UP (ref 0–6)
GLUCOSE SERPL-MCNC: 183 MG/DL — HIGH (ref 70–99)
GLUCOSE UR-MCNC: NEGATIVE — SIGNIFICANT CHANGE UP
HCT VFR BLD CALC: 42.4 % — SIGNIFICANT CHANGE UP (ref 39–50)
HGB BLD-MCNC: 14.7 G/DL — SIGNIFICANT CHANGE UP (ref 13–17)
IMM GRANULOCYTES # BLD AUTO: 0.08 # — SIGNIFICANT CHANGE UP
IMM GRANULOCYTES NFR BLD AUTO: 0.6 % — SIGNIFICANT CHANGE UP (ref 0–1.5)
KETONES UR-MCNC: NEGATIVE — SIGNIFICANT CHANGE UP
LEUKOCYTE ESTERASE UR-ACNC: NEGATIVE — SIGNIFICANT CHANGE UP
LYMPHOCYTES # BLD AUTO: 1.92 K/UL — SIGNIFICANT CHANGE UP (ref 1–3.3)
LYMPHOCYTES # BLD AUTO: 14.8 % — SIGNIFICANT CHANGE UP (ref 13–44)
MAGNESIUM SERPL-MCNC: 2 MG/DL — SIGNIFICANT CHANGE UP (ref 1.6–2.6)
MCHC RBC-ENTMCNC: 31.9 PG — SIGNIFICANT CHANGE UP (ref 27–34)
MCHC RBC-ENTMCNC: 34.7 % — SIGNIFICANT CHANGE UP (ref 32–36)
MCV RBC AUTO: 92 FL — SIGNIFICANT CHANGE UP (ref 80–100)
MONOCYTES # BLD AUTO: 0.68 K/UL — SIGNIFICANT CHANGE UP (ref 0–0.9)
MONOCYTES NFR BLD AUTO: 5.3 % — SIGNIFICANT CHANGE UP (ref 2–14)
NEUTROPHILS # BLD AUTO: 10.18 K/UL — HIGH (ref 1.8–7.4)
NEUTROPHILS NFR BLD AUTO: 78.7 % — HIGH (ref 43–77)
NITRITE UR-MCNC: NEGATIVE — SIGNIFICANT CHANGE UP
NRBC # FLD: 0 — SIGNIFICANT CHANGE UP
PH UR: 6.5 — SIGNIFICANT CHANGE UP (ref 5–8)
PHOSPHATE SERPL-MCNC: 4.2 MG/DL — SIGNIFICANT CHANGE UP (ref 2.5–4.5)
PLATELET # BLD AUTO: 227 K/UL — SIGNIFICANT CHANGE UP (ref 150–400)
PMV BLD: 11.9 FL — SIGNIFICANT CHANGE UP (ref 7–13)
POTASSIUM SERPL-MCNC: 4 MMOL/L — SIGNIFICANT CHANGE UP (ref 3.5–5.3)
POTASSIUM SERPL-SCNC: 4 MMOL/L — SIGNIFICANT CHANGE UP (ref 3.5–5.3)
PROT UR-MCNC: 10 — SIGNIFICANT CHANGE UP
RBC # BLD: 4.61 M/UL — SIGNIFICANT CHANGE UP (ref 4.2–5.8)
RBC # FLD: 11.5 % — SIGNIFICANT CHANGE UP (ref 10.3–14.5)
RBC CASTS # UR COMP ASSIST: SIGNIFICANT CHANGE UP (ref 0–?)
SODIUM SERPL-SCNC: 139 MMOL/L — SIGNIFICANT CHANGE UP (ref 135–145)
SP GR SPEC: 1.03 — SIGNIFICANT CHANGE UP (ref 1–1.04)
UROBILINOGEN FLD QL: SIGNIFICANT CHANGE UP
WBC # BLD: 12.94 K/UL — HIGH (ref 3.8–10.5)
WBC # FLD AUTO: 12.94 K/UL — HIGH (ref 3.8–10.5)
WBC UR QL: SIGNIFICANT CHANGE UP (ref 0–?)

## 2018-10-25 PROCEDURE — 99233 SBSQ HOSP IP/OBS HIGH 50: CPT | Mod: GC

## 2018-10-25 RX ORDER — DEXAMETHASONE 0.5 MG/5ML
4 ELIXIR ORAL EVERY 6 HOURS
Qty: 0 | Refills: 0 | Status: COMPLETED | OUTPATIENT
Start: 2018-10-25 | End: 2018-10-26

## 2018-10-25 RX ORDER — DEXAMETHASONE 0.5 MG/5ML
ELIXIR ORAL
Qty: 0 | Refills: 0 | Status: COMPLETED | OUTPATIENT
Start: 2018-10-25 | End: 2018-10-29

## 2018-10-25 RX ORDER — ACETAMINOPHEN 500 MG
650 TABLET ORAL EVERY 6 HOURS
Qty: 0 | Refills: 0 | Status: DISCONTINUED | OUTPATIENT
Start: 2018-10-25 | End: 2018-10-31

## 2018-10-25 RX ORDER — TIZANIDINE 4 MG/1
2 TABLET ORAL EVERY 6 HOURS
Qty: 0 | Refills: 0 | Status: DISCONTINUED | OUTPATIENT
Start: 2018-10-25 | End: 2018-11-01

## 2018-10-25 RX ORDER — DOCUSATE SODIUM 100 MG
1 CAPSULE ORAL
Qty: 0 | Refills: 0 | DISCHARGE
Start: 2018-10-25

## 2018-10-25 RX ORDER — DEXAMETHASONE 0.5 MG/5ML
1 ELIXIR ORAL EVERY 6 HOURS
Qty: 0 | Refills: 0 | Status: COMPLETED | OUTPATIENT
Start: 2018-10-28 | End: 2018-10-29

## 2018-10-25 RX ORDER — OXYCODONE HYDROCHLORIDE 5 MG/1
10 TABLET ORAL EVERY 12 HOURS
Qty: 0 | Refills: 0 | Status: DISCONTINUED | OUTPATIENT
Start: 2018-10-25 | End: 2018-10-25

## 2018-10-25 RX ORDER — DEXAMETHASONE 0.5 MG/5ML
2 ELIXIR ORAL EVERY 6 HOURS
Qty: 0 | Refills: 0 | Status: COMPLETED | OUTPATIENT
Start: 2018-10-27 | End: 2018-10-28

## 2018-10-25 RX ORDER — NICOTINE POLACRILEX 2 MG
1 GUM BUCCAL DAILY
Qty: 0 | Refills: 0 | Status: DISCONTINUED | OUTPATIENT
Start: 2018-10-25 | End: 2018-11-01

## 2018-10-25 RX ORDER — SENNA PLUS 8.6 MG/1
2 TABLET ORAL
Qty: 0 | Refills: 0 | DISCHARGE
Start: 2018-10-25

## 2018-10-25 RX ORDER — DEXAMETHASONE 0.5 MG/5ML
3 ELIXIR ORAL EVERY 6 HOURS
Qty: 0 | Refills: 0 | Status: COMPLETED | OUTPATIENT
Start: 2018-10-26 | End: 2018-10-27

## 2018-10-25 RX ORDER — POLYETHYLENE GLYCOL 3350 17 G/17G
17 POWDER, FOR SOLUTION ORAL
Qty: 0 | Refills: 0 | DISCHARGE
Start: 2018-10-25

## 2018-10-25 RX ORDER — ACETAMINOPHEN 500 MG
650 TABLET ORAL ONCE
Qty: 0 | Refills: 0 | Status: COMPLETED | OUTPATIENT
Start: 2018-10-25 | End: 2018-10-25

## 2018-10-25 RX ORDER — DEXAMETHASONE 0.5 MG/5ML
4 ELIXIR ORAL EVERY 6 HOURS
Qty: 0 | Refills: 0 | Status: DISCONTINUED | OUTPATIENT
Start: 2018-10-25 | End: 2018-10-25

## 2018-10-25 RX ADMIN — Medication 4 MILLIGRAM(S): at 01:01

## 2018-10-25 RX ADMIN — Medication 4 MILLIGRAM(S): at 07:46

## 2018-10-25 RX ADMIN — METHOCARBAMOL 750 MILLIGRAM(S): 500 TABLET, FILM COATED ORAL at 06:49

## 2018-10-25 RX ADMIN — Medication 100 MILLIGRAM(S): at 14:37

## 2018-10-25 RX ADMIN — LIDOCAINE 1 PATCH: 4 CREAM TOPICAL at 06:51

## 2018-10-25 RX ADMIN — LIDOCAINE 1 PATCH: 4 CREAM TOPICAL at 12:36

## 2018-10-25 RX ADMIN — LIDOCAINE 1 PATCH: 4 CREAM TOPICAL at 11:18

## 2018-10-25 RX ADMIN — TIZANIDINE 2 MILLIGRAM(S): 4 TABLET ORAL at 14:39

## 2018-10-25 RX ADMIN — ENOXAPARIN SODIUM 40 MILLIGRAM(S): 100 INJECTION SUBCUTANEOUS at 06:43

## 2018-10-25 RX ADMIN — Medication 600 MILLIGRAM(S): at 02:09

## 2018-10-25 RX ADMIN — GABAPENTIN 300 MILLIGRAM(S): 400 CAPSULE ORAL at 21:56

## 2018-10-25 RX ADMIN — Medication 1 PATCH: at 06:51

## 2018-10-25 RX ADMIN — LIDOCAINE 1 PATCH: 4 CREAM TOPICAL at 05:11

## 2018-10-25 RX ADMIN — Medication 4 MILLIGRAM(S): at 14:37

## 2018-10-25 RX ADMIN — Medication 600 MILLIGRAM(S): at 15:15

## 2018-10-25 RX ADMIN — OXYCODONE HYDROCHLORIDE 10 MILLIGRAM(S): 5 TABLET ORAL at 07:46

## 2018-10-25 RX ADMIN — GABAPENTIN 300 MILLIGRAM(S): 400 CAPSULE ORAL at 14:37

## 2018-10-25 RX ADMIN — AMLODIPINE BESYLATE 10 MILLIGRAM(S): 2.5 TABLET ORAL at 06:43

## 2018-10-25 RX ADMIN — Medication 4 MILLIGRAM(S): at 19:51

## 2018-10-25 RX ADMIN — Medication 650 MILLIGRAM(S): at 12:08

## 2018-10-25 RX ADMIN — Medication 1 PATCH: at 11:16

## 2018-10-25 RX ADMIN — LIDOCAINE 1 PATCH: 4 CREAM TOPICAL at 01:01

## 2018-10-25 RX ADMIN — Medication 600 MILLIGRAM(S): at 14:45

## 2018-10-25 RX ADMIN — Medication 100 MILLIGRAM(S): at 21:57

## 2018-10-25 RX ADMIN — Medication 100 MILLIGRAM(S): at 06:45

## 2018-10-25 RX ADMIN — GABAPENTIN 300 MILLIGRAM(S): 400 CAPSULE ORAL at 06:43

## 2018-10-25 RX ADMIN — Medication 600 MILLIGRAM(S): at 01:07

## 2018-10-25 RX ADMIN — LIDOCAINE 1 PATCH: 4 CREAM TOPICAL at 05:09

## 2018-10-25 RX ADMIN — Medication 650 MILLIGRAM(S): at 12:35

## 2018-10-25 NOTE — PROGRESS NOTE ADULT - PROBLEM SELECTOR PLAN 9
Likely atelectasis given pain lower back/leg pain (likely not moving)  - CXR - clear lungs - my reading  - f/u official CXR report DVT ppx: SubQ lovenox  carbohydrate diet  Dispo: Home with home PT Likely atelectasis given pain lower back/leg pain (likely not moving)  - CXR negative

## 2018-10-25 NOTE — PROGRESS NOTE ADULT - PROBLEM SELECTOR PLAN 7
- Unclear if bullae present from recent fall  - Consider dermatology consult if more bullae appear during admission (suggestive of systemic process) however appears isolated, without associated tenderness or warmth   -Monitor Continue amlodipine 10 daily - Unclear if bullae present from recent fall  - Consider dermatology consult in the future if more bullae appear during admission (suggestive of systemic process) however appears isolated, without associated tenderness or warmth or discharge  - Will continue to monitor

## 2018-10-25 NOTE — PROGRESS NOTE ADULT - PROBLEM SELECTOR PLAN 8
Continue amlodipine 10 daily Likely atelectasis given pain lower back/leg pain (likely not moving)  - CXR negative

## 2018-10-25 NOTE — CONSULT NOTE ADULT - ASSESSMENT
62M with chronic back pain presents with acute RLE radiculopathy, and difficulty ambulating.     Pt. A&Ox3, NAD, laying in bed, tolerating PO diet. Answers all questions appropriately, currently on gabapentin 300mg TID and Ibuprofen 600mg BID PRN. Pt. able to move around in bed with slight limitation.

## 2018-10-25 NOTE — DISCHARGE NOTE ADULT - PROVIDER TOKENS
TOKEN:'[id number]',FREE:[LAST:[Jler],FIRST:[Gene],PHONE:[phone]:[(   )    -],ADDRESS:[[address] [place] [address]]],FREE:[LAST:[Christine],FIRST:[Varghese],PHONE:[phone]],FAX:[(   )    -],ADDRESS:[[address] [place] [address]]]

## 2018-10-25 NOTE — PROGRESS NOTE ADULT - PROBLEM SELECTOR PLAN 4
Likely reactive in setting of pain and recent steroid use; No clinical evidence of infection; UA not suggestive of UTI;   - Repeat CBC in AM  - No fever, tachycardia to suggest infectious source Presents with pain of Rt foot of unclear etiology   - X-ray shows no acute fracture or soft tissue swelling  - PT following  - Pain control

## 2018-10-25 NOTE — PROGRESS NOTE ADULT - SUBJECTIVE AND OBJECTIVE BOX
Richy Martinez Ma, MD  PGY 1 Internal Medicine  Pager: [phone]    Patient is a 62y old  Male who presents with a chief complaint of Back pain (24 Oct 2018 19:19)      SUBJECTIVE / OVERNIGHT EVENTS: NAEO. Back and leg pain improved now. Denies fever, chills, chest pain, or abdominal pain. Last BM was last Saturday, because he ran out of bowel regimens at home.        MEDICATIONS  (STANDING):  acetaminophen   Tablet .. 650 milliGRAM(s) Oral once  amLODIPine   Tablet 10 milliGRAM(s) Oral daily  dexamethasone     Tablet   Oral   dexamethasone     Tablet 4 milliGRAM(s) Oral every 6 hours  docusate sodium 100 milliGRAM(s) Oral three times a day  enoxaparin Injectable 40 milliGRAM(s) SubCutaneous every 24 hours  gabapentin 300 milliGRAM(s) Oral three times a day  lidocaine   Patch 1 Patch Transdermal daily  nicotine -   7 mG/24Hr(s) Patch 1 patch Transdermal daily  polyethylene glycol 3350 17 Gram(s) Oral daily  senna 2 Tablet(s) Oral at bedtime    MEDICATIONS  (PRN):  acetaminophen   Tablet .. 650 milliGRAM(s) Oral every 6 hours PRN Mild Pain (1 - 3)  ibuprofen  Tablet. 600 milliGRAM(s) Oral two times a day PRN mild to moderate pain  tiZANidine 2 milliGRAM(s) Oral every 6 hours PRN spasm      Vital Signs Last 24 Hrs  T(C): 36.8 (25 Oct 2018 04:30), Max: 37.2 (24 Oct 2018 20:48)  T(F): 98.2 (25 Oct 2018 04:30), Max: 99 (24 Oct 2018 20:48)  HR: 52 (25 Oct 2018 07:42) (46 - 56)  BP: 144/90 (25 Oct 2018 06:37) (121/71 - 160/84)  BP(mean): --  RR: 16 (25 Oct 2018 07:42) (16 - 18)  SpO2: 100% (25 Oct 2018 07:42) (96% - 100%)      PHYSICAL EXAM  GENERAL: NAD, well-developed, lying down in bed comfortably  EYES: sclera clear bilatearlly  NECK: Supple  CHEST/LUNG: CTAB, no wheezing or rales, nonlabored breathing  HEART: RRR, S1 and S2, no murmurs  ABDOMEN: Soft, NT, ND, +BS  EXTREMITIES: no peripheral edema  NEURO: 4/5 strength bilateral legs, numbness to touch from right foot to above knee per patient, which is worse than previous admission, no focal neurological findings otherwise  BACK: No spinal tenderness. Mild right lower back paraspinal tenderness.   RECTUM: normal rectal tone, no gross saddle anesthesia  PSYCH: Appropriate mood and cooperative  SKIN: No rash. Small bullae-like lesion on right shin but without any discharge or fluid inside. No surrounding erythema, appears stable.      CAPILLARY BLOOD GLUCOSE      POCT Blood Glucose.: 112 mg/dL (25 Oct 2018 00:49)    I&O's Summary    24 Oct 2018 07:01  -  25 Oct 2018 07:00  --------------------------------------------------------  IN: 0 mL / OUT: 1200 mL / NET: -1200 mL        LABS:                        14.7   12.94 )-----------( 227      ( 25 Oct 2018 06:19 )             42.4     10-25    139  |  101  |  23  ----------------------------<  183<H>  4.0   |  24  |  0.67    Ca    8.9      25 Oct 2018 06:19  Phos  4.2     10-25  Mg     2.0     10-25    TPro  6.9  /  Alb  4.0  /  TBili  0.5  /  DBili  x   /  AST  16  /  ALT  35  /  AlkPhos  55  10-24          Urinalysis Basic - ( 24 Oct 2018 23:25 )    Color: YELLOW / Appearance: CLEAR / S.032 / pH: 6.5  Gluc: NEGATIVE / Ketone: NEGATIVE  / Bili: NEGATIVE / Urobili: TRACE   Blood: NEGATIVE / Protein: 10 / Nitrite: NEGATIVE   Leuk Esterase: NEGATIVE / RBC: 0-2 / WBC 0-2   Sq Epi: x / Non Sq Epi: x / Bacteria: x        RADIOLOGY & ADDITIONAL TESTS:     MICROBIOLOGY:    ANTIMICROBIALS:    CONSULTS:

## 2018-10-25 NOTE — DISCHARGE NOTE ADULT - CARE PROVIDER_API CALL
Norman Huddleston), Orthopaedic Surgery  [address]  [place] [address]  Phone: [phone]  Fax: [phone]    Jacob Denton  [address] [place] [address]  Phone: [phone]  Fax: (   )    -    Varghese King  [address] [place] [address]  Phone: [phone]  Fax: (   )    -

## 2018-10-25 NOTE — DISCHARGE NOTE ADULT - CARE PROVIDERS DIRECT ADDRESSES
[email or web address],DirectAddress_Unknown,DirectAddress_Unknown

## 2018-10-25 NOTE — PROGRESS NOTE ADULT - PROBLEM SELECTOR PLAN 2
Has uncontrolled pain requiring IV pain control;  - S/p oxycodone ER 10mg BID and Dilaudid 1 IVP Q6  - Ibuprofen, lidocaine patches, gabapentin 300 TID, and methocarbamol. Will change methocarbamol to tizanidine per pain management team  - Pain management team consulted, recs appreciated  - Avoiding narcotics given ongoing constipation and no narcotic prescriptions will be given at discharge  - ECG - sinus bradycardia   -C/w bowel regimen for opioid induced constipation Has uncontrolled pain requiring IV pain control;  - S/p oxycodone ER 10mg BID and Dilaudid 1 IVP Q6 regimens  - Ibuprofen, lidocaine patches, gabapentin 300 TID, and methocarbamol. Will change methocarbamol to tizanidine per pain management team  - Pain management team consulted, recs appreciated  - Avoiding narcotics given ongoing constipation and no narcotic prescriptions will be given at discharge  - ECG - sinus bradycardia   - C/w bowel regimen for opioid induced constipation

## 2018-10-25 NOTE — PROGRESS NOTE ADULT - PROBLEM SELECTOR PLAN 6
Last Hga1c was 6.0 suggestive of pre-DM  - Consistent carbohydrate diet  - Can hold SSI - Unclear if bullae present from recent fall  - Consider dermatology consult in the future if more bullae appear during admission (suggestive of systemic process) however appears isolated, without associated tenderness or warmth or discharge  - Will continue to monitor

## 2018-10-25 NOTE — DISCHARGE NOTE ADULT - ADDITIONAL INSTRUCTIONS
Please continue your pain medications and physical therapy.  Please follow up with your bone/spine doctor.

## 2018-10-25 NOTE — PROGRESS NOTE ADULT - PROBLEM SELECTOR PLAN 10
DVT ppx: SubQ lovenox  carbohydrate diet  Nicotine patch started  Tobacco cessation counseling provided at bedside      Sumemr Hernandez MD  Internal Medicine, PGY-2  Pager [phone] DVT ppx: SubQ lovenox  carbohydrate diet  Dispo: Home with home PT

## 2018-10-25 NOTE — CONSULT NOTE ADULT - SUBJECTIVE AND OBJECTIVE BOX
Orthopedic spine consult note    Subjective:  Patient is a 62y Male w/ PMH of DM, Rampart, chronic LBP (managed by otpt Neurologist), recently admitted to [facility] 10/13-10/18 for worsening of back pain/radiculopathy, is now admitted again for worsening of back pain since discharge home with home PT.  He feels that his pain was inadequately controlled at discharge and has steadily worsened since then.  He was discharged with a steroid taper, reports that he took some but not all of the doses.  He was seen by home PT who were arranging for him to go to outpatient visits, and he was to follow up with orthopedics but reports he had trouble setting up transportation to these visits, as he does not drive.  By yesterday the pain had increased to the point that he felt the need to return to the ED for pain control.      He currently endorses some tingling in his right foot and pain centered in his lower back, non-radiating, worse with ambulation.  He also endorses throbbing pains in his right knee and ankle, but denies radiation from his LBP.  He ambulates with a walker at baseline and endorses a long-standing history of intermittent falls, has fallen 3 times since discharge while walking to the bathroom at night, denies any injuries or worsening of back pain after these falls.      HEALTH ISSUES - PROBLEM Dx:  Bradycardia: Bradycardia  Back pain: Back pain  Foot pain, right: Foot pain, right  Leukocytosis: Leukocytosis  Dehydration: Dehydration  Pain management: Pain management  Prophylactic measure: Prophylactic measure  Rales: Rales  Dark urine: Dark urine  Bullae: Bullae  Hypertension: Hypertension  Prediabetes: Prediabetes  Lumbar spinal stenosis: Lumbar spinal stenosis          MEDICATIONS  (STANDING):  amLODIPine   Tablet 10 milliGRAM(s) Oral daily  dexamethasone     Tablet   Oral   dexamethasone     Tablet 4 milliGRAM(s) Oral every 6 hours  docusate sodium 100 milliGRAM(s) Oral three times a day  enoxaparin Injectable 40 milliGRAM(s) SubCutaneous every 24 hours  gabapentin 300 milliGRAM(s) Oral three times a day  lidocaine   Patch 1 Patch Transdermal daily  nicotine -   7 mG/24Hr(s) Patch 1 patch Transdermal daily  polyethylene glycol 3350 17 Gram(s) Oral daily  senna 2 Tablet(s) Oral at bedtime      Allergies    No Known Allergies    Intolerances        PAST MEDICAL & SURGICAL HISTORY:  Hearing difficulty, unspecified laterality  Lumbar stenosis  Prediabetes  Diabetes  No pertinent past medical history  No significant past surgical history                            14.7   12.94 )-----------( 227      ( 25 Oct 2018 06:19 )             42.4       25 Oct 2018 06:19    139    |  101    |  23     ----------------------------<  183    4.0     |  24     |  0.67     Ca    8.9        25 Oct 2018 06:19  Phos  4.2       25 Oct 2018 06:19  Mg     2.0       25 Oct 2018 06:19    TPro  6.9    /  Alb  4.0    /  TBili  0.5    /  DBili  x      /  AST  16     /  ALT  35     /  AlkPhos  55     24 Oct 2018 17:30          Urinalysis Basic - ( 24 Oct 2018 23:25 )    Color: YELLOW / Appearance: CLEAR / S.032 / pH: 6.5  Gluc: NEGATIVE / Ketone: NEGATIVE  / Bili: NEGATIVE / Urobili: TRACE   Blood: NEGATIVE / Protein: 10 / Nitrite: NEGATIVE   Leuk Esterase: NEGATIVE / RBC: 0-2 / WBC 0-2   Sq Epi: x / Non Sq Epi: x / Bacteria: x        Vital Signs Last 24 Hrs  T(C): 36.7 (10-25-18 @ 15:08), Max: 37.2 (10-24-18 @ 20:48)  T(F): 98.1 (10-25-18 @ 15:08), Max: 99 (10-24-18 @ 20:48)  HR: 64 (10-25-18 @ 15:08) (46 - 64)  BP: 133/70 (10-25-18 @ 15:08) (121/71 - 144/90)  BP(mean): --  RR: 18 (10-25-18 @ 15:08) (16 - 18)  SpO2: 99% (10-25-18 @ 15:08) (96% - 100%)      Physical exam:  Gen: NAD, alert and oriented x3.      Spine PE:  Skin intact  No gross deformity  No midline TTP C/T/L/S spine  No bony step offs  No paraspinal muscle ttp/hypertonicity   Negative clonus  Negative babinski  Negative nunes  Negative SLR bilaterally  No saddle anesthesia    Motor:                   C5                C6              C7               C8           T1   R            5/5                5/5            5/5             5/5          5/5  L             5/5               5/5             5/5             5/5          5/5                L2             L3             L4               L5                  S1  R         4+/5*         4+/5*        4+/5*          5/5                5/5     *exam limited due to pain  L          5/5            5/5              5/5             0/5*             5/5    *chronic exam finding  Sensory:            C5         C6         C7      C8       T1        (0=absent, 1=impaired, 2=normal, NT=not testable)  R         2            2           2        2         2  L          2            2           2        2         2               L2          L3         L4      L5       S1         (0=absent, 1=impaired, 2=normal, NT=not testable)  R         2            2            2       1       2  L          2            2           2        2         2    Imaging:    Prior admission MRI:   < from: MR Lumbar Spine w/wo IV Cont (10.17.18 @ 16:33) >  Impression:    Moderate central canal stenosis at 23.  Severe central canal stenosis at L3-4 and L4-5.  L4-5-1 right parasagittal extruded disc fragment extends superiorly     < end of copied text >

## 2018-10-25 NOTE — CONSULT NOTE ADULT - SUBJECTIVE AND OBJECTIVE BOX
Chief Complaint:    HPI:  63 yo Male, smoker, with MHx of Type 2 DM, hearing impairment, chronic back pain and bilateral lower extremity pain in setting of recently diagnosed severe central canal stenosis at L3-L5, who presents with worsening back pain.   Reports chronic back pain for "years" which has become progressively worse. He was recently hospitalized on Oct 13th-Oct 18th and was discharged w/ physical therapy. He felt on discharge that his pain was not adequately controlled. After his discharge he has not been able to participate with PT and his pain has become worse. He attempted to complete his steroid taper, however he was not sure what he needed and still has remaining pills. Describes his pain as a "heaviness and stiffness. It starts from right side of his lower back to both his knees and down towards his feet. He has tried walking with his walker, however he has fallen three times since his discharge (he denies loss of consciousness or trauma to the head). He was able to "catch" himself, and it was in the setting of trying to climb the stairs. He wakes up with 10/10 pain. His medications and heat has helped with the pain. Sitting on the toilet has made the pain worse and he has felt constipated. He has noticed recent urinary hesitancy and sometimes he cannot reach the restroom. This has been going on for the last week since he was at the hospital. He denies urinary frequency or dysuria, however his mother notes that his urine is brown/dark. He has not had fecal incontinence. Denied saddle anesthesia.   Of note he had transient chest pain while in bed lasting a few seconds. He has also had significant R foot pain and a new (one) bullae "without explanation" on his leg. He has not been able to follow-up with orthopedic surgery as the pain is worse. He has also noticed sweats, but no fever or chills. Reports no medical or surgical FHx of spinal problems in parents or siblings.     While in ED: T97.8, 56, 121/71, 18, 96% RA. He was given oxycodone 5 mg. An x-ray of the R ankle had no acute fracture. (24 Oct 2018 19:19)      PAST MEDICAL & SURGICAL HISTORY:  Hearing difficulty, unspecified laterality  Lumbar stenosis  Prediabetes  No significant past surgical history      FAMILY HISTORY:  No significant family history      SOCIAL HISTORY:  [ ] Denies Smoking, Alcohol, or Drug Use    Allergies    No Known Allergies    Intolerances        PAIN MEDICATIONS:  acetaminophen   Tablet .. 650 milliGRAM(s) Oral every 6 hours PRN  gabapentin 300 milliGRAM(s) Oral three times a day  ibuprofen  Tablet. 600 milliGRAM(s) Oral two times a day PRN  tiZANidine 2 milliGRAM(s) Oral every 6 hours PRN      Heme:  enoxaparin Injectable 40 milliGRAM(s) SubCutaneous every 24 hours    Antibiotics:    Cardiovascular:  amLODIPine   Tablet 10 milliGRAM(s) Oral daily    GI:  docusate sodium 100 milliGRAM(s) Oral three times a day  polyethylene glycol 3350 17 Gram(s) Oral daily  senna 2 Tablet(s) Oral at bedtime    Endocrine:  dexamethasone     Tablet   Oral   dexamethasone     Tablet 4 milliGRAM(s) Oral every 6 hours    All Other Medications:  lidocaine   Patch 1 Patch Transdermal daily  nicotine -   7 mG/24Hr(s) Patch 1 patch Transdermal daily      REVIEW OF SYSTEMS:    CONSTITUTIONAL: No fever, weight loss, or fatigue  EYES: No eye pain, visual disturbances, or discharge  ENMT:  No difficulty hearing, tinnitus, vertigo; No sinus or throat pain  NECK: No pain or stiffness      Vital Signs Last 24 Hrs  T(C): 36.8 (25 Oct 2018 04:30), Max: 37.2 (24 Oct 2018 20:48)  T(F): 98.2 (25 Oct 2018 04:30), Max: 99 (24 Oct 2018 20:48)  HR: 52 (25 Oct 2018 07:42) (46 - 56)  BP: 144/90 (25 Oct 2018 06:37) (121/71 - 160/84)  BP(mean): --  RR: 16 (25 Oct 2018 07:42) (16 - 18)  SpO2: 100% (25 Oct 2018 07:42) (96% - 100%)    PAIN SCORE:         SCALE USED: (1-10 VNRS)             PHYSICAL EXAM:    GENERAL: NAD, well-groomed, well-developed  HEAD:  Atraumatic, Normocephalic  EYES: EOMI, PERRLA, conjunctiva and sclera clear          LABS:                          14.7   12.94 )-----------( 227      ( 25 Oct 2018 06:19 )             42.4     10    139  |  101  |  23  ----------------------------<  183<H>  4.0   |  24  |  0.67    Ca    8.9      25 Oct 2018 06:19  Phos  4.2     10-25  Mg     2.0     10-25    TPro  6.9  /  Alb  4.0  /  TBili  0.5  /  DBili  x   /  AST  16  /  ALT  35  /  AlkPhos  55  10-24      Urinalysis Basic - ( 24 Oct 2018 23:25 )    Color: YELLOW / Appearance: CLEAR / S.032 / pH: 6.5  Gluc: NEGATIVE / Ketone: NEGATIVE  / Bili: NEGATIVE / Urobili: TRACE   Blood: NEGATIVE / Protein: 10 / Nitrite: NEGATIVE   Leuk Esterase: NEGATIVE / RBC: 0-2 / WBC 0-2   Sq Epi: x / Non Sq Epi: x / Bacteria: x      Subjective: "I'm in alot of pain. It's a 9/10 right now. Nothing is really helping with my pain. The pain is mostly in my lower back and I get shooting pains in my legs when I make certain movements. I've been taking Motrin at home but it doesn't do anything for me. I need something stronger."

## 2018-10-25 NOTE — PROGRESS NOTE ADULT - ASSESSMENT
61 yo Male with MHx of Type 2 DM, hearing impairment, chronic back pain and bilateral lower extremity pain in setting of recently diagnosed severe central canal stenosis at L3-L5 a/w acute on chronic lower back pain c/b difficulty walking, falls and b/l LE weakness, Rt>Lt and urinary hesitancy; Found to be dehydrated and to have leukocytosis; Also asymptomatic sinus bradycardia, opioid induced constipation.

## 2018-10-25 NOTE — CONSULT NOTE ADULT - PROBLEM SELECTOR RECOMMENDATION 9
1. Add Tizanidine 2mg Q6hrs PRN, Hold for oversedation sedation.   2. Continue gabapentin 300mg TID  3. change ibuprofen to standing x2days   4. Consider adding lidoderm patch to lower back PRN  5. Have pt. follow up with a chronic pain MD as out patient through his insurance.

## 2018-10-25 NOTE — PHYSICAL THERAPY INITIAL EVALUATION ADULT - PERTINENT HX OF CURRENT PROBLEM, REHAB EVAL
Pt. is a 62 year old male admitted to [facility] secondary to dorsalgia. PMH: lumbar stenosis.

## 2018-10-25 NOTE — DISCHARGE NOTE ADULT - HOSPITAL COURSE
61 yo Male with MHx of Type 2 DM, hearing impairment, chronic back pain and bilateral lower extremity pain in setting of recently diagnosed severe central canal stenosis at L3-L5 a/w acute on chronic lower back pain c/b difficulty walking, falls and b/l LE weakness, Rt>Lt and urinary hesitancy. 63 yo Male with MHx of Type 2 DM, hearing impairment, chronic back pain and bilateral lower extremity pain in setting of recently diagnosed severe central canal stenosis at L3-L5 a/w acute on chronic lower back pain c/b difficulty walking, falls and b/l LE weakness, Rt>Lt and urinary hesitancy. However, no other concerning signs or symptoms were noted, such as bowel incontinence, poor rectal tone, saddle anesthesia, and spinal tenderness. His symptoms improved after changing methocarbamol to tizanidine for muscle relaxer. Decadron was started and tapered. Physical therapy recommended rehab. Patient is otherwise medically stable for discharge to rehab. 63 yo Male with MHx of Type 2 DM, hearing impairment, chronic back pain and bilateral lower extremity pain in setting of recently diagnosed severe central canal stenosis at L3-L5 a/w acute on chronic lower back pain c/b difficulty walking, falls and b/l LE weakness, Rt>Lt and urinary hesitancy. However, no other concerning signs or symptoms were noted, such as gross urine/bowel incontinence, poor rectal tone, saddle anesthesia, and spinal tenderness. His symptoms improved after changing methocarbamol to tizanidine for muscle relaxer. Decadron was started and tapered. Physical therapy recommended rehab. During the hospital course, patient complained of right knee pain, which appeared chronic. Minimal swelling was noted on exam. X-ray showed no joint effusion or fracture. Patient is otherwise medically stable for discharge to rehab.

## 2018-10-25 NOTE — DISCHARGE NOTE ADULT - CARE PLAN
Principal Discharge DX:	Lumbar spinal stenosis  Goal:	Continue pain medications, follow up with physical therapy and orthopedic doctor  Assessment and plan of treatment:	You came to the hospital with worsening back and leg pain. Your pain medications were changed. Principal Discharge DX:	Lumbar spinal stenosis  Goal:	Continue pain medications, follow up with physical therapy and orthopedic doctor  Assessment and plan of treatment:	You came to the hospital with worsening back and leg pain. Your pain medications were adjusted. Bone/spine doctor saw you and recommended no surgery. Steroid medications were started and tapered off. You are instructed to get discharged to rehabilitation facility for physical therapy. Please continue your medications and physical therapy. Please follow up with your bone doctor, Dr. Huddleston. Principal Discharge DX:	Lumbar spinal stenosis  Goal:	Continue pain medications, follow up with physical therapy and orthopedic doctor  Assessment and plan of treatment:	You came to the hospital with worsening back and leg pain. Your pain medications were adjusted. Bone/spine doctor saw you and recommended no surgery. Steroid medications were started and tapered off. You are instructed to get discharged to rehabilitation facility for physical therapy. Please continue your medications and physical therapy. Please follow up with your orthopedic doctor, Dr. Huddleston on discharge. Please call [phone] on discharge from the rehab facility.

## 2018-10-25 NOTE — DISCHARGE NOTE ADULT - MEDICATION SUMMARY - MEDICATIONS TO CHANGE
I will SWITCH the dose or number of times a day I take the medications listed below when I get home from the hospital:  None I will SWITCH the dose or number of times a day I take the medications listed below when I get home from the hospital:    ibuprofen 600 mg oral tablet  -- 1 tab(s) by mouth 2 times a day, As Needed

## 2018-10-25 NOTE — PHYSICAL THERAPY INITIAL EVALUATION ADULT - PATIENT PROFILE REVIEW, REHAB EVAL
Pt. profile reviewed, consulted with [facility] prior to initial PT evaluation and tx, as per RN, Pt. is OK to participate in skilled therapy session, current activity orders; increase as tolerated/yes

## 2018-10-25 NOTE — DISCHARGE NOTE ADULT - PLAN OF CARE
Continue pain medications, follow up with physical therapy and orthopedic doctor You came to the hospital with worsening back and leg pain. Your pain medications were changed. You came to the hospital with worsening back and leg pain. Your pain medications were adjusted. Bone/spine doctor saw you and recommended no surgery. Steroid medications were started and tapered off. You are instructed to get discharged to rehabilitation facility for physical therapy. Please continue your medications and physical therapy. Please follow up with your bone doctor, Dr. Huddleston. You came to the hospital with worsening back and leg pain. Your pain medications were adjusted. Bone/spine doctor saw you and recommended no surgery. Steroid medications were started and tapered off. You are instructed to get discharged to rehabilitation facility for physical therapy. Please continue your medications and physical therapy. Please follow up with your orthopedic doctor, Dr. Huddleston on discharge. Please call [phone] on discharge from the rehab facility.

## 2018-10-25 NOTE — DISCHARGE NOTE ADULT - MEDICATION SUMMARY - MEDICATIONS TO TAKE
I will START or STAY ON the medications listed below when I get home from the hospital:    ibuprofen 600 mg oral tablet  -- 1 tab(s) by mouth 2 times a day, As Needed  -- Indication: For Back pain    gabapentin 300 mg oral capsule  -- 1 cap(s) by mouth 3 times a day  -- Indication: For Back pain    amLODIPine 10 mg oral tablet  -- 1 tab(s) by mouth once a day  -- Indication: For HTN    lidocaine 5% topical film  -- Apply on skin to affected area once a day  -- Indication: For Back pain    docusate sodium 100 mg oral capsule  -- 1 cap(s) by mouth 3 times a day  -- Indication: For Constipation    senna oral tablet  -- 2 tab(s) by mouth once a day (at bedtime)  -- Indication: For Constipation    polyethylene glycol 3350 oral powder for reconstitution  -- 17 gram(s) by mouth once a day  -- Indication: For Constipation    tiZANidine 2 mg oral tablet  -- 1 tab(s) by mouth every 6 hours, As needed, spasm  -- Indication: For Back pain    nicotine 7 mg/24 hr transdermal film, extended release  -- 1 patch by transdermal patch once a day  -- Indication: For Smoking cessation I will START or STAY ON the medications listed below when I get home from the hospital:    ibuprofen 400 mg oral tablet  -- 1 tab(s) by mouth every 6 hours, As needed, Moderate Pain (4 - 6)  -- Indication: For Lumbar stenosis    acetaminophen 325 mg oral tablet  -- 3 tab(s) by mouth every 6 hours, As needed, Mild Pain (1 - 3), Moderate Pain (4 - 6)  -- Indication: For Lumbar stenosis    gabapentin 300 mg oral capsule  -- 1 cap(s) by mouth 3 times a day  -- Indication: For Back pain    amLODIPine 10 mg oral tablet  -- 1 tab(s) by mouth once a day  -- Indication: For HTN    lidocaine 5% topical film  -- Apply on skin to affected area once a day  -- Indication: For Back pain    docusate sodium 100 mg oral capsule  -- 1 cap(s) by mouth 3 times a day  -- Indication: For Constipation    senna oral tablet  -- 2 tab(s) by mouth once a day (at bedtime)  -- Indication: For Constipation    polyethylene glycol 3350 oral powder for reconstitution  -- 17 gram(s) by mouth once a day  -- Indication: For Constipation    tiZANidine 2 mg oral tablet  -- 1 tab(s) by mouth every 6 hours, As needed, spasm  -- Indication: For Back pain    nicotine 7 mg/24 hr transdermal film, extended release  -- 1 patch by transdermal patch once a day  -- Indication: For Smoking cessation

## 2018-10-25 NOTE — PHYSICAL THERAPY INITIAL EVALUATION ADULT - IMPAIRED TRANSFERS: SIT/STAND, REHAB EVAL
impaired balance/pain/decreased strength/Pt. in agreement to participate in skilled therapy session despite complaints of pain.

## 2018-10-25 NOTE — DISCHARGE NOTE ADULT - PATIENT PORTAL LINK FT
You can access the [email or web address], offered by [facility], by registering with the following website: [email or web address]

## 2018-10-25 NOTE — PHYSICAL THERAPY INITIAL EVALUATION ADULT - GAIT DISTANCE, PT EVAL
10 feet/further distance not assessed secondary to pt. wanting to return to bed, will assess further distance when feasible

## 2018-10-25 NOTE — PROGRESS NOTE ADULT - PROBLEM SELECTOR PLAN 3
Elevated BUN: Cr ratio >20 suggestive of dehydration  - Plan to provide IVF overnight  - Recheck in AM Likely reactive in setting of pain and recent steroid use; No clinical evidence of infection; UA not suggestive of UTI; currently 12-13s  - No fever, tachycardia to suggest infectious source  - Currently on Decadron taper  - Will continue to monitor

## 2018-10-25 NOTE — PROGRESS NOTE ADULT - PROBLEM SELECTOR PLAN 1
Has known severe spinal stenosis on recent MRI from last admission 10/17. Reports worsening pain, b/l LE weakness (Rt>Lt), difficulty walking, urinary hesitancy and intermittent difficulty holding urine before reaching bathroom; Does not appear to have saddle anesthesia on exam.   - Last MRI with severe central canal stenosis at L3-4 and L4-5. L4-5-1 Right parasagittal extruded disc fragment. Circumferential disc bulging with a right parasagittal disc extrusion extending superiorly into the right L4 recess where results in mass effect upon the descending right L5 nerve root and compression of   the thecal sac.   - Repeat MRI is not indicated given no gross bladder/bowel incontinence, saddle anesthesia, or poor rectal tone on exam  - Ortho consulted, recs appreciated  - C/w decardon taper  - Ibuprofen, lidocaine patches, gabapentin 300 TID, and methocarbamol. Pain management recommended to change methocarbamol to tizanidine.   - Avoiding narcotics given ongoing constipation and no narcotic prescriptions will be given at discharge.

## 2018-10-25 NOTE — CONSULT NOTE ADULT - ASSESSMENT
A/P: 62y Male with DM, chronic LBP, chronic L toe drop, recently admitted for LBP exacerbation, now presenting for worsening of LBP since discharge.  RLE numbness is improved since prior hospitalization, RLE weakness likely due to pain.      - Pain control  - WBAT with assistive devices as needed  - Steroid taper   - follow up as outpatient with Dr. Huddleston  - No acute orthopedic intervention indicated at this time

## 2018-10-25 NOTE — PROGRESS NOTE ADULT - PROBLEM SELECTOR PLAN 5
Presents with pain of Rt foot of unclear etiology   - X-ray preliminary findings without acute fractures  - f/u official report   - PT eval  - Pain control Last Hga1c was 6.0 suggestive of pre-DM  - Consistent carbohydrate diet  - Can hold SSI asymptomatic  avoid AV sandi agents

## 2018-10-26 LAB
BUN SERPL-MCNC: 24 MG/DL — HIGH (ref 7–23)
CALCIUM SERPL-MCNC: 8.7 MG/DL — SIGNIFICANT CHANGE UP (ref 8.4–10.5)
CHLORIDE SERPL-SCNC: 100 MMOL/L — SIGNIFICANT CHANGE UP (ref 98–107)
CO2 SERPL-SCNC: 24 MMOL/L — SIGNIFICANT CHANGE UP (ref 22–31)
CREAT SERPL-MCNC: 0.66 MG/DL — SIGNIFICANT CHANGE UP (ref 0.5–1.3)
GLUCOSE SERPL-MCNC: 247 MG/DL — HIGH (ref 70–99)
HCT VFR BLD CALC: 41.2 % — SIGNIFICANT CHANGE UP (ref 39–50)
HGB BLD-MCNC: 14.6 G/DL — SIGNIFICANT CHANGE UP (ref 13–17)
MAGNESIUM SERPL-MCNC: 2.1 MG/DL — SIGNIFICANT CHANGE UP (ref 1.6–2.6)
MCHC RBC-ENTMCNC: 31.9 PG — SIGNIFICANT CHANGE UP (ref 27–34)
MCHC RBC-ENTMCNC: 35.4 % — SIGNIFICANT CHANGE UP (ref 32–36)
MCV RBC AUTO: 90.2 FL — SIGNIFICANT CHANGE UP (ref 80–100)
NRBC # FLD: 0 — SIGNIFICANT CHANGE UP
PHOSPHATE SERPL-MCNC: 3.5 MG/DL — SIGNIFICANT CHANGE UP (ref 2.5–4.5)
PLATELET # BLD AUTO: 227 K/UL — SIGNIFICANT CHANGE UP (ref 150–400)
PMV BLD: 11.2 FL — SIGNIFICANT CHANGE UP (ref 7–13)
POTASSIUM SERPL-MCNC: 4.6 MMOL/L — SIGNIFICANT CHANGE UP (ref 3.5–5.3)
POTASSIUM SERPL-SCNC: 4.6 MMOL/L — SIGNIFICANT CHANGE UP (ref 3.5–5.3)
RBC # BLD: 4.57 M/UL — SIGNIFICANT CHANGE UP (ref 4.2–5.8)
RBC # FLD: 11.2 % — SIGNIFICANT CHANGE UP (ref 10.3–14.5)
SODIUM SERPL-SCNC: 136 MMOL/L — SIGNIFICANT CHANGE UP (ref 135–145)
WBC # BLD: 21.63 K/UL — HIGH (ref 3.8–10.5)
WBC # FLD AUTO: 21.63 K/UL — HIGH (ref 3.8–10.5)

## 2018-10-26 PROCEDURE — 99233 SBSQ HOSP IP/OBS HIGH 50: CPT | Mod: GC

## 2018-10-26 RX ORDER — LIDOCAINE 4 G/100G
1 CREAM TOPICAL
Qty: 0 | Refills: 0 | DISCHARGE
Start: 2018-10-26

## 2018-10-26 RX ORDER — TIZANIDINE 4 MG/1
1 TABLET ORAL
Qty: 0 | Refills: 0 | DISCHARGE
Start: 2018-10-26

## 2018-10-26 RX ADMIN — Medication 600 MILLIGRAM(S): at 22:14

## 2018-10-26 RX ADMIN — Medication 1 PATCH: at 11:51

## 2018-10-26 RX ADMIN — GABAPENTIN 300 MILLIGRAM(S): 400 CAPSULE ORAL at 22:13

## 2018-10-26 RX ADMIN — Medication 3 MILLIGRAM(S): at 14:07

## 2018-10-26 RX ADMIN — SENNA PLUS 2 TABLET(S): 8.6 TABLET ORAL at 22:13

## 2018-10-26 RX ADMIN — TIZANIDINE 2 MILLIGRAM(S): 4 TABLET ORAL at 01:06

## 2018-10-26 RX ADMIN — Medication 600 MILLIGRAM(S): at 07:54

## 2018-10-26 RX ADMIN — Medication 4 MILLIGRAM(S): at 01:07

## 2018-10-26 RX ADMIN — GABAPENTIN 300 MILLIGRAM(S): 400 CAPSULE ORAL at 14:06

## 2018-10-26 RX ADMIN — TIZANIDINE 2 MILLIGRAM(S): 4 TABLET ORAL at 07:01

## 2018-10-26 RX ADMIN — Medication 3 MILLIGRAM(S): at 09:58

## 2018-10-26 RX ADMIN — Medication 100 MILLIGRAM(S): at 06:53

## 2018-10-26 RX ADMIN — Medication 600 MILLIGRAM(S): at 23:10

## 2018-10-26 RX ADMIN — LIDOCAINE 1 PATCH: 4 CREAM TOPICAL at 11:50

## 2018-10-26 RX ADMIN — ENOXAPARIN SODIUM 40 MILLIGRAM(S): 100 INJECTION SUBCUTANEOUS at 06:53

## 2018-10-26 RX ADMIN — Medication 100 MILLIGRAM(S): at 14:07

## 2018-10-26 RX ADMIN — LIDOCAINE 1 PATCH: 4 CREAM TOPICAL at 23:36

## 2018-10-26 RX ADMIN — Medication 100 MILLIGRAM(S): at 22:13

## 2018-10-26 RX ADMIN — Medication 3 MILLIGRAM(S): at 19:00

## 2018-10-26 RX ADMIN — POLYETHYLENE GLYCOL 3350 17 GRAM(S): 17 POWDER, FOR SOLUTION ORAL at 11:51

## 2018-10-26 RX ADMIN — AMLODIPINE BESYLATE 10 MILLIGRAM(S): 2.5 TABLET ORAL at 06:55

## 2018-10-26 RX ADMIN — GABAPENTIN 300 MILLIGRAM(S): 400 CAPSULE ORAL at 06:53

## 2018-10-26 RX ADMIN — Medication 600 MILLIGRAM(S): at 06:55

## 2018-10-26 RX ADMIN — TIZANIDINE 2 MILLIGRAM(S): 4 TABLET ORAL at 14:11

## 2018-10-26 NOTE — PROGRESS NOTE ADULT - PROBLEM SELECTOR PLAN 1
Has known severe spinal stenosis on recent MRI from last admission 10/17. Reports worsening pain, b/l LE weakness (Rt>Lt), difficulty walking, urinary hesitancy and intermittent difficulty holding urine before reaching bathroom; Does not appear to have saddle anesthesia on exam.   - Last MRI with severe central canal stenosis at L3-4 and L4-5. L4-5-1 Right parasagittal extruded disc fragment. Circumferential disc bulging with a right parasagittal disc extrusion extending superiorly into the right L4 recess where results in mass effect upon the descending right L5 nerve root and compression of   the thecal sac.   - Repeat MRI is not indicated given no gross bladder/bowel incontinence, saddle anesthesia, or poor rectal tone on exam  - Ortho consulted, recs appreciated  - C/w decardon taper  - Ibuprofen, lidocaine patches, gabapentin 300 TID, and methocarbamol. Pain management recommended to change methocarbamol to tizanidine.   - Avoiding narcotics given ongoing constipation and no narcotic prescriptions will be given at discharge. Has known severe spinal stenosis on recent MRI from last admission 10/17. Reports worsening pain, b/l LE weakness (Rt>Lt), difficulty walking, urinary hesitancy and intermittent difficulty holding urine before reaching bathroom; Does not appear to have saddle anesthesia on exam.   - Last MRI with severe central canal stenosis at L3-4 and L4-5. L4-5-1 Right parasagittal extruded disc fragment. Circumferential disc bulging with a right parasagittal disc extrusion extending superiorly into the right L4 recess where results in mass effect upon the descending right L5 nerve root and compression of   the thecal sac.   - Repeat MRI is not indicated given no gross bladder/bowel incontinence, saddle anesthesia, or poor rectal tone on exam  - Ortho consulted, recs appreciated. No acute intervention, c/w steroid taper, and f/u outpatient with Dr. Huddleston  - C/w decardon taper  - Initially with Ibuprofen, lidocaine patches, gabapentin 300 TID, and methocarbamol. Pain management recommended to change methocarbamol to tizanidine. Symptoms improved.  - Avoiding narcotics given ongoing constipation and no narcotic prescriptions will be given at discharge.

## 2018-10-26 NOTE — PROGRESS NOTE ADULT - ASSESSMENT
63 yo Male with MHx of Type 2 DM, hearing impairment, chronic back pain and bilateral lower extremity pain in setting of recently diagnosed severe central canal stenosis at L3-L5 a/w acute on chronic lower back pain c/b difficulty walking, falls and b/l LE weakness, Rt>Lt and urinary hesitancy; Found to be dehydrated and to have leukocytosis; Also asymptomatic sinus bradycardia, opioid induced constipation. 61 yo Male with MHx of Type 2 DM, hearing impairment, chronic back pain and bilateral lower extremity pain in setting of recently diagnosed severe central canal stenosis at L3-L5 a/w acute on chronic lower back pain c/b difficulty walking, falls and b/l LE weakness, Rt>Lt and urinary hesitancy; Found to be dehydrated and to have leukocytosis; also asymptomatic sinus bradycardia, opioid induced constipation.

## 2018-10-26 NOTE — PROGRESS NOTE ADULT - PROBLEM SELECTOR PLAN 4
Presents with pain of Rt foot of unclear etiology   - X-ray shows no acute fracture or soft tissue swelling  - PT following  - Pain control

## 2018-10-26 NOTE — PROGRESS NOTE ADULT - PROBLEM SELECTOR PLAN 10
DVT ppx: SubQ lovenox  carbohydrate diet  Dispo: Home with home PT DVT ppx: SubQ lovenox  carbohydrate diet  Dispo: Home with home PT. However, he only walked 10 ft, so may consider re-eval.

## 2018-10-26 NOTE — PROGRESS NOTE ADULT - PROBLEM SELECTOR PLAN 2
Has uncontrolled pain requiring IV pain control;  - S/p oxycodone ER 10mg BID and Dilaudid 1 IVP Q6 regimens  - Ibuprofen, lidocaine patches, gabapentin 300 TID, and methocarbamol. Will change methocarbamol to tizanidine per pain management team  - Pain management team consulted, recs appreciated  - Avoiding narcotics given ongoing constipation and no narcotic prescriptions will be given at discharge  - ECG - sinus bradycardia   - C/w bowel regimen for opioid induced constipation Has uncontrolled pain requiring IV pain control initially  - S/p oxycodone ER 10mg BID and Dilaudid 1 IVP Q6 regimens  - Ibuprofen, lidocaine patches, gabapentin 300 TID, and methocarbamol. Will change methocarbamol to tizanidine per pain management team  - Pain management team consulted, recs appreciated  - Avoiding narcotics given ongoing constipation and no narcotic prescriptions will be given at discharge  - ECG - sinus bradycardia   - C/w bowel regimen for opioid induced constipation

## 2018-10-26 NOTE — PROGRESS NOTE ADULT - PROBLEM SELECTOR PLAN 3
Likely reactive in setting of pain and recent steroid use; No clinical evidence of infection; UA not suggestive of UTI; currently 12-13s  - No fever, tachycardia to suggest infectious source  - Currently on Decadron taper  - Will continue to monitor Likely reactive in setting of pain and recent steroid use; No clinical evidence of infection; UA not suggestive of UTI; currently 21.  - No fever, tachycardia to suggest infectious source  - Currently on Decadron taper  - Will continue to monitor

## 2018-10-26 NOTE — PROGRESS NOTE ADULT - PROBLEM SELECTOR PLAN 7
- Unclear if bullae present from recent fall  - Consider dermatology consult in the future if more bullae appear during admission (suggestive of systemic process) however appears isolated, without associated tenderness or warmth or discharge  - Will continue to monitor Stable  - Unclear if bullae present from recent fall  - Consider dermatology consult in the future if more bullae appear during admission (suggestive of systemic process) however currently appears isolated, without associated tenderness or warmth or discharge  - Will continue to monitor

## 2018-10-26 NOTE — PROGRESS NOTE ADULT - SUBJECTIVE AND OBJECTIVE BOX
Richy Martinez Ma, MD  PGY 1 Internal Medicine  Pager: [phone]    Patient is a 62y old  Male who presents with a chief complaint of Back pain (25 Oct 2018 17:58)      SUBJECTIVE / OVERNIGHT EVENTS: NAEO.          MEDICATIONS  (STANDING):  amLODIPine   Tablet 10 milliGRAM(s) Oral daily  dexamethasone     Tablet   Oral   dexamethasone     Tablet 3 milliGRAM(s) Oral every 6 hours  docusate sodium 100 milliGRAM(s) Oral three times a day  enoxaparin Injectable 40 milliGRAM(s) SubCutaneous every 24 hours  gabapentin 300 milliGRAM(s) Oral three times a day  lidocaine   Patch 1 Patch Transdermal daily  nicotine -   7 mG/24Hr(s) Patch 1 patch Transdermal daily  polyethylene glycol 3350 17 Gram(s) Oral daily  senna 2 Tablet(s) Oral at bedtime    MEDICATIONS  (PRN):  acetaminophen   Tablet .. 650 milliGRAM(s) Oral every 6 hours PRN Mild Pain (1 - 3)  ibuprofen  Tablet. 600 milliGRAM(s) Oral two times a day PRN mild to moderate pain  tiZANidine 2 milliGRAM(s) Oral every 6 hours PRN spasm      Vital Signs Last 24 Hrs  T(C): 37.1 (26 Oct 2018 02:03), Max: 37.1 (26 Oct 2018 02:03)  T(F): 98.7 (26 Oct 2018 02:03), Max: 98.7 (26 Oct 2018 02:03)  HR: 56 (26 Oct 2018 02:03) (52 - 64)  BP: 107/95 (26 Oct 2018 02:03) (107/95 - 144/90)  BP(mean): --  RR: 17 (26 Oct 2018 02:03) (16 - 18)  SpO2: 100% (26 Oct 2018 02:03) (97% - 100%)      PHYSICAL EXAM  GENERAL: NAD, well-developed  HEAD:  Atraumatic, Normocephalic  EYES: EOMI, PERRLA, conjunctiva and sclera clear  NECK: Supple, No JVD  CHEST/LUNG: Clear to auscultation bilaterally; No wheeze  HEART: Regular rate and rhythm; No murmurs, rubs, or gallops  ABDOMEN: Soft, Nontender, Nondistended; Bowel sounds present  EXTREMITIES:  2+ Peripheral Pulses, No clubbing, cyanosis, or edema  PSYCH: AAOx3  SKIN: No rashes or lesions    CAPILLARY BLOOD GLUCOSE        I&O's Summary    24 Oct 2018 07:  -  25 Oct 2018 07:00  --------------------------------------------------------  IN: 0 mL / OUT: 1200 mL / NET: -1200 mL    25 Oct 2018 07:  -  26 Oct 2018 06:00  --------------------------------------------------------  IN: 0 mL / OUT: 650 mL / NET: -650 mL        LABS:                        14.6   x     )-----------( 227      ( 26 Oct 2018 05:38 )             41.2     10-25    139  |  101  |  23  ----------------------------<  183<H>  4.0   |  24  |  0.67    Ca    8.9      25 Oct 2018 06:19  Phos  4.2     10-25  Mg     2.0     10-25    TPro  6.9  /  Alb  4.0  /  TBili  0.5  /  DBili  x   /  AST  16  /  ALT  35  /  AlkPhos  55  10-24          Urinalysis Basic - ( 24 Oct 2018 23:25 )    Color: YELLOW / Appearance: CLEAR / S.032 / pH: 6.5  Gluc: NEGATIVE / Ketone: NEGATIVE  / Bili: NEGATIVE / Urobili: TRACE   Blood: NEGATIVE / Protein: 10 / Nitrite: NEGATIVE   Leuk Esterase: NEGATIVE / RBC: 0-2 / WBC 0-2   Sq Epi: x / Non Sq Epi: x / Bacteria: x        RADIOLOGY & ADDITIONAL TESTS:     MICROBIOLOGY:    ANTIMICROBIALS:    CONSULTS: Richy Martinez Ma, MD  PGY 1 Internal Medicine  Pager: [phone]    Patient is a 62y old  Male who presents with a chief complaint of Back pain (25 Oct 2018 17:58)      SUBJECTIVE / OVERNIGHT EVENTS: NAEO. He reports his back/leg pain improved after tizanidine. Refused bowel regimens yesterday PM, because when he felt like he needed to go to bathroom for BM, he requested for nursing assistance, but by the time his nurse arrives, he no longer feels like having a BM.          MEDICATIONS  (STANDING):  amLODIPine   Tablet 10 milliGRAM(s) Oral daily  dexamethasone     Tablet   Oral   dexamethasone     Tablet 3 milliGRAM(s) Oral every 6 hours  docusate sodium 100 milliGRAM(s) Oral three times a day  enoxaparin Injectable 40 milliGRAM(s) SubCutaneous every 24 hours  gabapentin 300 milliGRAM(s) Oral three times a day  lidocaine   Patch 1 Patch Transdermal daily  nicotine -   7 mG/24Hr(s) Patch 1 patch Transdermal daily  polyethylene glycol 3350 17 Gram(s) Oral daily  senna 2 Tablet(s) Oral at bedtime    MEDICATIONS  (PRN):  acetaminophen   Tablet .. 650 milliGRAM(s) Oral every 6 hours PRN Mild Pain (1 - 3)  ibuprofen  Tablet. 600 milliGRAM(s) Oral two times a day PRN mild to moderate pain  tiZANidine 2 milliGRAM(s) Oral every 6 hours PRN spasm      Vital Signs Last 24 Hrs  T(C): 37.1 (26 Oct 2018 02:03), Max: 37.1 (26 Oct 2018 02:03)  T(F): 98.7 (26 Oct 2018 02:03), Max: 98.7 (26 Oct 2018 02:03)  HR: 56 (26 Oct 2018 02:03) (52 - 64)  BP: 107/95 (26 Oct 2018 02:03) (107/95 - 144/90)  BP(mean): --  RR: 17 (26 Oct 2018 02:03) (16 - 18)  SpO2: 100% (26 Oct 2018 02:03) (97% - 100%)      PHYSICAL EXAM  GENERAL: NAD, well-developed, lying down in bed comfortably  EYES: sclera clear bilatearlly  NECK: Supple  CHEST/LUNG: CTAB, no wheezing or rales, nonlabored breathing  HEART: RRR, S1 and S2, no murmurs  ABDOMEN: Soft, NT, ND, +BS  EXTREMITIES: no peripheral edema  NEURO: Able to raise legs against gravity, numbness to touch from right foot to above knee per patient, which is worse than previous admission, no focal neurological findings otherwise  BACK: No spinal tenderness. No paraspinal tenderness.  PSYCH: Appropriate mood and cooperative  SKIN: No rash. Small bullae-like lesion on right shin but without any discharge or fluid inside. No surrounding erythema, appears stable.    CAPILLARY BLOOD GLUCOSE        I&O's Summary    24 Oct 2018 07:01  -  25 Oct 2018 07:00  --------------------------------------------------------  IN: 0 mL / OUT: 1200 mL / NET: -1200 mL    25 Oct 2018 07:01  -  26 Oct 2018 06:00  --------------------------------------------------------  IN: 0 mL / OUT: 650 mL / NET: -650 mL        LABS:                        14.6   x     )-----------( 227      ( 26 Oct 2018 05:38 )             41.2     10-25    139  |  101  |  23  ----------------------------<  183<H>  4.0   |  24  |  0.67    Ca    8.9      25 Oct 2018 06:19  Phos  4.2     10-25  Mg     2.0     10-25    TPro  6.9  /  Alb  4.0  /  TBili  0.5  /  DBili  x   /  AST  16  /  ALT  35  /  AlkPhos  55  10-24          Urinalysis Basic - ( 24 Oct 2018 23:25 )    Color: YELLOW / Appearance: CLEAR / S.032 / pH: 6.5  Gluc: NEGATIVE / Ketone: NEGATIVE  / Bili: NEGATIVE / Urobili: TRACE   Blood: NEGATIVE / Protein: 10 / Nitrite: NEGATIVE   Leuk Esterase: NEGATIVE / RBC: 0-2 / WBC 0-2   Sq Epi: x / Non Sq Epi: x / Bacteria: x        RADIOLOGY & ADDITIONAL TESTS:     MICROBIOLOGY:    ANTIMICROBIALS:    CONSULTS:

## 2018-10-26 NOTE — PROGRESS NOTE ADULT - PROBLEM SELECTOR PLAN 9
Likely atelectasis given pain lower back/leg pain (likely not moving)  - CXR negative improved  Likely atelectasis given pain lower back/leg pain (likely not moving)  - CXR negative

## 2018-10-27 DIAGNOSIS — M25.561 PAIN IN RIGHT KNEE: ICD-10-CM

## 2018-10-27 PROCEDURE — 99232 SBSQ HOSP IP/OBS MODERATE 35: CPT | Mod: GC

## 2018-10-27 RX ORDER — LIDOCAINE 4 G/100G
1 CREAM TOPICAL DAILY
Qty: 0 | Refills: 0 | Status: DISCONTINUED | OUTPATIENT
Start: 2018-10-27 | End: 2018-11-01

## 2018-10-27 RX ADMIN — Medication 1 PATCH: at 15:56

## 2018-10-27 RX ADMIN — TIZANIDINE 2 MILLIGRAM(S): 4 TABLET ORAL at 22:47

## 2018-10-27 RX ADMIN — Medication 100 MILLIGRAM(S): at 13:27

## 2018-10-27 RX ADMIN — LIDOCAINE 1 PATCH: 4 CREAM TOPICAL at 13:29

## 2018-10-27 RX ADMIN — Medication 600 MILLIGRAM(S): at 13:33

## 2018-10-27 RX ADMIN — AMLODIPINE BESYLATE 10 MILLIGRAM(S): 2.5 TABLET ORAL at 06:15

## 2018-10-27 RX ADMIN — Medication 2 MILLIGRAM(S): at 18:31

## 2018-10-27 RX ADMIN — POLYETHYLENE GLYCOL 3350 17 GRAM(S): 17 POWDER, FOR SOLUTION ORAL at 13:34

## 2018-10-27 RX ADMIN — Medication 600 MILLIGRAM(S): at 06:16

## 2018-10-27 RX ADMIN — SENNA PLUS 2 TABLET(S): 8.6 TABLET ORAL at 22:11

## 2018-10-27 RX ADMIN — ENOXAPARIN SODIUM 40 MILLIGRAM(S): 100 INJECTION SUBCUTANEOUS at 06:16

## 2018-10-27 RX ADMIN — TIZANIDINE 2 MILLIGRAM(S): 4 TABLET ORAL at 09:39

## 2018-10-27 RX ADMIN — Medication 100 MILLIGRAM(S): at 06:15

## 2018-10-27 RX ADMIN — Medication 1 PATCH: at 13:34

## 2018-10-27 RX ADMIN — Medication 1 PATCH: at 11:00

## 2018-10-27 RX ADMIN — Medication 600 MILLIGRAM(S): at 14:03

## 2018-10-27 RX ADMIN — Medication 100 MILLIGRAM(S): at 22:11

## 2018-10-27 RX ADMIN — GABAPENTIN 300 MILLIGRAM(S): 400 CAPSULE ORAL at 22:11

## 2018-10-27 RX ADMIN — TIZANIDINE 2 MILLIGRAM(S): 4 TABLET ORAL at 16:44

## 2018-10-27 RX ADMIN — Medication 600 MILLIGRAM(S): at 07:10

## 2018-10-27 RX ADMIN — GABAPENTIN 300 MILLIGRAM(S): 400 CAPSULE ORAL at 13:30

## 2018-10-27 RX ADMIN — Medication 2 MILLIGRAM(S): at 09:58

## 2018-10-27 RX ADMIN — GABAPENTIN 300 MILLIGRAM(S): 400 CAPSULE ORAL at 06:15

## 2018-10-27 RX ADMIN — Medication 3 MILLIGRAM(S): at 01:38

## 2018-10-27 RX ADMIN — Medication 2 MILLIGRAM(S): at 13:30

## 2018-10-27 NOTE — PROGRESS NOTE ADULT - ASSESSMENT
62M h/o DM2, hearing impairment, chronic back pain and bilateral lower extremity pain in setting of recently diagnosed severe central canal stenosis at L3-L5 adm 10/24 for acute on chronic lower back pain c/b difficulty walking, falls, and bilateral LE weakness likely 2/2 known central canal stenosis. 62M h/o preDM, hearing impairment, chronic back pain and bilateral lower extremity pain in setting of recently diagnosed severe central canal stenosis at L3-L5 adm 10/24 for acute on chronic lower back pain c/b difficulty walking, falls, and bilateral LE weakness likely 2/2 known central canal stenosis.

## 2018-10-27 NOTE — PROGRESS NOTE ADULT - PROBLEM SELECTOR PLAN 3
- In the setting of steroid use.  - Continue to monitor. - Continue tizanidine, gabapentin, ibuprofen, dexamethasone.  - Pain regimen per Pain Mgmt consult, appreciate involvement.  - Avoiding narcotics given ongoing constipation and no narcotic prescriptions will be given at discharge.  - Bowel regimen.

## 2018-10-27 NOTE — PROGRESS NOTE ADULT - PROBLEM SELECTOR PLAN 2
- Continue tizanidine, gabapentin, ibuprofen, dexamethasone.  - Pain regimen per Pain Mgmt consult, appreciate involvement.  - Avoiding narcotics given ongoing constipation and no narcotic prescriptions will be given at discharge.  - Bowel regimen. - Small swelling noted.  - Check x-ray.

## 2018-10-27 NOTE — PROGRESS NOTE ADULT - PROBLEM SELECTOR PLAN 6
- Stable.  Unclear if bullae present from recent fall  - Consider Dermatology consult in the future if more bullae appear during admission (suggestive of systemic process).  However, currently appears isolated, without associated tenderness or warmth or discharge  - Will continue to monitor. - CC diet. can be on regular diet, patient doesn't want diabetic diet  elevated sugars from steroids

## 2018-10-27 NOTE — PROGRESS NOTE ADULT - SUBJECTIVE AND OBJECTIVE BOX
Cheri Dave MD  PGY-3 | Internal Medicine  [phone] / [phone]    Patient is a 62y old  Male who presents with a chief complaint of Back pain (26 Oct 2018 05:59)      SUBJECTIVE / OVERNIGHT EVENTS: NAEON.    MEDICATIONS  (STANDING):  amLODIPine   Tablet 10 milliGRAM(s) Oral daily  dexamethasone     Tablet   Oral   dexamethasone     Tablet 2 milliGRAM(s) Oral every 6 hours  docusate sodium 100 milliGRAM(s) Oral three times a day  enoxaparin Injectable 40 milliGRAM(s) SubCutaneous every 24 hours  gabapentin 300 milliGRAM(s) Oral three times a day  lidocaine   Patch 1 Patch Transdermal daily  nicotine -   7 mG/24Hr(s) Patch 1 patch Transdermal daily  polyethylene glycol 3350 17 Gram(s) Oral daily  senna 2 Tablet(s) Oral at bedtime    MEDICATIONS  (PRN):  acetaminophen   Tablet .. 650 milliGRAM(s) Oral every 6 hours PRN Mild Pain (1 - 3)  ibuprofen  Tablet. 600 milliGRAM(s) Oral two times a day PRN mild to moderate pain  tiZANidine 2 milliGRAM(s) Oral every 6 hours PRN spasm      T(C): 36.8 (10-27-18 @ 06:11), Max: 36.9 (10-26-18 @ 21:39)  HR: 56 (10-27-18 @ 06:11) (56 - 64)  BP: 124/85 (10-27-18 @ 06:11) (121/87 - 127/76)  RR: 18 (10-27-18 @ 06:11) (17 - 18)  SpO2: 98% (10-27-18 @ 06:11) (97% - 100%)    CAPILLARY BLOOD GLUCOSE      POCT Blood Glucose.: 202 mg/dL (26 Oct 2018 12:31)  POCT Blood Glucose.: 221 mg/dL (26 Oct 2018 09:45)    I&O's Summary    26 Oct 2018 07:01  -  27 Oct 2018 07:00  --------------------------------------------------------  IN: 0 mL / OUT: 1600 mL / NET: -1600 mL        PHYSICAL EXAM:  GENERAL:  HEAD:  EYES:  NECK:  CHEST/LUNG:  HEART:  ABDOMEN:  EXTREMITIES:  PSYCH:  NEUROLOGY:  SKIN:    LABS:                        14.6   21.63 )-----------( 227      ( 26 Oct 2018 05:38 )             41.2     10-26    136  |  100  |  24<H>  ----------------------------<  247<H>  4.6   |  24  |  0.66    Ca    8.7      26 Oct 2018 05:38  Phos  3.5     10-26  Mg     2.1     10-26                RADIOLOGY & ADDITIONAL TESTS:    Imaging Personally Reviewed:     Consultant(s) Notes Reviewed:     Care Discussed with Consultants/Other Providers: Cheri Dave MD  PGY-3 | Internal Medicine  [phone] / [phone]    Patient is a 62y old  Male who presents with a chief complaint of Back pain (26 Oct 2018 05:59)      SUBJECTIVE / OVERNIGHT EVENTS: NAEON.  Pt reports R knee pain because he did not get his "blue pill" last night.  Otherwise has no complaints including F/C, N/V/D, chest pain, abd pain.    MEDICATIONS  (STANDING):  amLODIPine   Tablet 10 milliGRAM(s) Oral daily  dexamethasone     Tablet   Oral   dexamethasone     Tablet 2 milliGRAM(s) Oral every 6 hours  docusate sodium 100 milliGRAM(s) Oral three times a day  enoxaparin Injectable 40 milliGRAM(s) SubCutaneous every 24 hours  gabapentin 300 milliGRAM(s) Oral three times a day  lidocaine   Patch 1 Patch Transdermal daily  nicotine -   7 mG/24Hr(s) Patch 1 patch Transdermal daily  polyethylene glycol 3350 17 Gram(s) Oral daily  senna 2 Tablet(s) Oral at bedtime    MEDICATIONS  (PRN):  acetaminophen   Tablet .. 650 milliGRAM(s) Oral every 6 hours PRN Mild Pain (1 - 3)  ibuprofen  Tablet. 600 milliGRAM(s) Oral two times a day PRN mild to moderate pain  tiZANidine 2 milliGRAM(s) Oral every 6 hours PRN spasm      T(C): 36.8 (10-27-18 @ 06:11), Max: 36.9 (10-26-18 @ 21:39)  HR: 56 (10-27-18 @ 06:11) (56 - 64)  BP: 124/85 (10-27-18 @ 06:11) (121/87 - 127/76)  RR: 18 (10-27-18 @ 06:11) (17 - 18)  SpO2: 98% (10-27-18 @ 06:11) (97% - 100%)    CAPILLARY BLOOD GLUCOSE      POCT Blood Glucose.: 202 mg/dL (26 Oct 2018 12:31)  POCT Blood Glucose.: 221 mg/dL (26 Oct 2018 09:45)    I&O's Summary    26 Oct 2018 07:01  -  27 Oct 2018 07:00  --------------------------------------------------------  IN: 0 mL / OUT: 1600 mL / NET: -1600 mL        PHYSICAL EXAM:  T(C): 36.8 (10-27-18 @ 06:11), Max: 36.9 (10-26-18 @ 21:39)  HR: 56 (10-27-18 @ 06:11) (56 - 64)  BP: 124/85 (10-27-18 @ 06:11) (121/87 - 127/76)  RR: 18 (10-27-18 @ 06:11) (17 - 18)  SpO2: 98% (10-27-18 @ 06:11) (97% - 100%)    Gen: awake, alert  HENT: neck soft / supple  Lymph: no LAD noted in neck  Eye: sclerae anicteric  CV: normal rate, regular rhythm  Pulm: CTAB, no w/r/r auscultated  Abd: +BS, soft, NT, ND  Skin: warm, dry  MSK: R knee with small effusion below patella, passive ROM full, active ROM full, no TTP of patella, no patellar laxity  Ext: no LE edema  Neuro: answering questions appropriately, following commands appropriately, recent and remote memory intact  Psych: normal mood / affect    LABS:                        14.6   21.63 )-----------( 227      ( 26 Oct 2018 05:38 )             41.2     10-26    136  |  100  |  24<H>  ----------------------------<  247<H>  4.6   |  24  |  0.66    Ca    8.7      26 Oct 2018 05:38  Phos  3.5     10-26  Mg     2.1     10-26                RADIOLOGY & ADDITIONAL TESTS:    Imaging Personally Reviewed:     Consultant(s) Notes Reviewed:     Care Discussed with Consultants/Other Providers: Cheri Dave MD  PGY-3 | Internal Medicine  [phone] / [phone]    Patient is a 62y old  Male who presents with a chief complaint of Back pain (26 Oct 2018 05:59)      SUBJECTIVE / OVERNIGHT EVENTS: NAEON.  Pt reports R knee pain because he did not get his "blue pill" last night.  Otherwise has no complaints including F/C, N/V/D, chest pain, abd pain.    MEDICATIONS  (STANDING):  amLODIPine   Tablet 10 milliGRAM(s) Oral daily  dexamethasone     Tablet   Oral   dexamethasone     Tablet 2 milliGRAM(s) Oral every 6 hours  docusate sodium 100 milliGRAM(s) Oral three times a day  enoxaparin Injectable 40 milliGRAM(s) SubCutaneous every 24 hours  gabapentin 300 milliGRAM(s) Oral three times a day  lidocaine   Patch 1 Patch Transdermal daily  nicotine -   7 mG/24Hr(s) Patch 1 patch Transdermal daily  polyethylene glycol 3350 17 Gram(s) Oral daily  senna 2 Tablet(s) Oral at bedtime    MEDICATIONS  (PRN):  acetaminophen   Tablet .. 650 milliGRAM(s) Oral every 6 hours PRN Mild Pain (1 - 3)  ibuprofen  Tablet. 600 milliGRAM(s) Oral two times a day PRN mild to moderate pain  tiZANidine 2 milliGRAM(s) Oral every 6 hours PRN spasm      T(C): 36.8 (10-27-18 @ 06:11), Max: 36.9 (10-26-18 @ 21:39)  HR: 56 (10-27-18 @ 06:11) (56 - 64)  BP: 124/85 (10-27-18 @ 06:11) (121/87 - 127/76)  RR: 18 (10-27-18 @ 06:11) (17 - 18)  SpO2: 98% (10-27-18 @ 06:11) (97% - 100%)    CAPILLARY BLOOD GLUCOSE      POCT Blood Glucose.: 202 mg/dL (26 Oct 2018 12:31)  POCT Blood Glucose.: 221 mg/dL (26 Oct 2018 09:45)    I&O's Summary    26 Oct 2018 07:01  -  27 Oct 2018 07:00  --------------------------------------------------------  IN: 0 mL / OUT: 1600 mL / NET: -1600 mL        PHYSICAL EXAM:  T(C): 36.8 (10-27-18 @ 06:11), Max: 36.9 (10-26-18 @ 21:39)  HR: 56 (10-27-18 @ 06:11) (56 - 64)  BP: 124/85 (10-27-18 @ 06:11) (121/87 - 127/76)  RR: 18 (10-27-18 @ 06:11) (17 - 18)  SpO2: 98% (10-27-18 @ 06:11) (97% - 100%)    Gen: awake, alert  HENT: neck soft / supple  Lymph: no LAD noted in neck  Eye: sclerae anicteric  CV: bradycardic, regular rhythm  Pulm: CTAB, no w/r/r auscultated  Abd: +BS, soft, NT, ND  Skin: warm, dry  MSK: R knee with small effusion below patella, passive ROM full, active ROM full, no TTP of patella, no patellar laxity  Ext: no LE edema  Neuro: answering questions appropriately, following commands appropriately, recent and remote memory intact  Psych: normal mood / affect    LABS:                        14.6   21.63 )-----------( 227      ( 26 Oct 2018 05:38 )             41.2     10-26    136  |  100  |  24<H>  ----------------------------<  247<H>  4.6   |  24  |  0.66    Ca    8.7      26 Oct 2018 05:38  Phos  3.5     10-26  Mg     2.1     10-26                RADIOLOGY & ADDITIONAL TESTS:    Imaging Personally Reviewed:     Consultant(s) Notes Reviewed:     Care Discussed with Consultants/Other Providers:

## 2018-10-27 NOTE — PROGRESS NOTE ADULT - PROBLEM SELECTOR PLAN 7
- Amlodipine 10mg q day. - Stable.  Unclear if bullae present from recent fall  - Consider Dermatology consult in the future if more bullae appear during admission (suggestive of systemic process).  However, currently appears isolated, without associated tenderness or warmth or discharge  - Will continue to monitor.

## 2018-10-27 NOTE — PROGRESS NOTE ADULT - PROBLEM SELECTOR PLAN 1
- 2/2 known severe spinal stenosis on recent MRI from last admission 10/17/18.  - Repeat MRI is not indicated given no gross bladder/bowel incontinence, saddle anesthesia, or poor rectal tone on exam.  - On dexamethasone taper.  - Ortho consulted, recs appreciated.  No acute intervention, c/w steroid taper, and f/u outpatient with Dr. Huddleston  - Avoiding narcotics given ongoing constipation and no narcotic prescriptions will be given at discharge.

## 2018-10-28 PROCEDURE — 73562 X-RAY EXAM OF KNEE 3: CPT | Mod: 26,RT

## 2018-10-28 PROCEDURE — 99232 SBSQ HOSP IP/OBS MODERATE 35: CPT | Mod: GC

## 2018-10-28 RX ADMIN — Medication 600 MILLIGRAM(S): at 13:42

## 2018-10-28 RX ADMIN — ENOXAPARIN SODIUM 40 MILLIGRAM(S): 100 INJECTION SUBCUTANEOUS at 06:19

## 2018-10-28 RX ADMIN — GABAPENTIN 300 MILLIGRAM(S): 400 CAPSULE ORAL at 06:19

## 2018-10-28 RX ADMIN — SENNA PLUS 2 TABLET(S): 8.6 TABLET ORAL at 22:01

## 2018-10-28 RX ADMIN — Medication 1 PATCH: at 13:11

## 2018-10-28 RX ADMIN — Medication 600 MILLIGRAM(S): at 01:53

## 2018-10-28 RX ADMIN — Medication 1 PATCH: at 13:00

## 2018-10-28 RX ADMIN — Medication 1 MILLIGRAM(S): at 22:01

## 2018-10-28 RX ADMIN — POLYETHYLENE GLYCOL 3350 17 GRAM(S): 17 POWDER, FOR SOLUTION ORAL at 13:12

## 2018-10-28 RX ADMIN — TIZANIDINE 2 MILLIGRAM(S): 4 TABLET ORAL at 22:01

## 2018-10-28 RX ADMIN — Medication 100 MILLIGRAM(S): at 22:01

## 2018-10-28 RX ADMIN — Medication 600 MILLIGRAM(S): at 02:33

## 2018-10-28 RX ADMIN — Medication 650 MILLIGRAM(S): at 09:55

## 2018-10-28 RX ADMIN — LIDOCAINE 1 PATCH: 4 CREAM TOPICAL at 01:29

## 2018-10-28 RX ADMIN — Medication 1 MILLIGRAM(S): at 09:18

## 2018-10-28 RX ADMIN — TIZANIDINE 2 MILLIGRAM(S): 4 TABLET ORAL at 09:18

## 2018-10-28 RX ADMIN — Medication 600 MILLIGRAM(S): at 13:12

## 2018-10-28 RX ADMIN — Medication 1 MILLIGRAM(S): at 15:00

## 2018-10-28 RX ADMIN — GABAPENTIN 300 MILLIGRAM(S): 400 CAPSULE ORAL at 22:01

## 2018-10-28 RX ADMIN — GABAPENTIN 300 MILLIGRAM(S): 400 CAPSULE ORAL at 15:00

## 2018-10-28 RX ADMIN — LIDOCAINE 1 PATCH: 4 CREAM TOPICAL at 13:12

## 2018-10-28 RX ADMIN — Medication 650 MILLIGRAM(S): at 10:25

## 2018-10-28 RX ADMIN — AMLODIPINE BESYLATE 10 MILLIGRAM(S): 2.5 TABLET ORAL at 06:19

## 2018-10-28 RX ADMIN — Medication 100 MILLIGRAM(S): at 06:19

## 2018-10-28 RX ADMIN — Medication 2 MILLIGRAM(S): at 06:19

## 2018-10-28 RX ADMIN — Medication 1 PATCH: at 08:02

## 2018-10-28 NOTE — PROGRESS NOTE ADULT - PROBLEM SELECTOR PLAN 7
- Stable.  Unclear if bullae present from recent fall  - Consider Dermatology consult in the future if more bullae appear during admission (suggestive of systemic process).  However, currently appears isolated, without associated tenderness or warmth or discharge  - Will continue to monitor.

## 2018-10-28 NOTE — PROGRESS NOTE ADULT - PROBLEM SELECTOR PLAN 9
- Enoxaparin.    Dispo: home PT vs rehab pending PT evaluation. - Enoxaparin.    Dispo: Rehab per PT

## 2018-10-28 NOTE — PROGRESS NOTE ADULT - PROBLEM SELECTOR PLAN 2
- Small swelling noted.  - Check x-ray. - Small swelling noted. However, appears chronic and stable, per patient  - Check x-ray.

## 2018-10-28 NOTE — PROGRESS NOTE ADULT - ASSESSMENT
62M h/o preDM, hearing impairment, chronic back pain and bilateral lower extremity pain in setting of recently diagnosed severe central canal stenosis at L3-L5 adm 10/24 for acute on chronic lower back pain c/b difficulty walking, falls, and bilateral LE weakness likely 2/2 known central canal stenosis.

## 2018-10-28 NOTE — PROGRESS NOTE ADULT - PROBLEM SELECTOR PLAN 1
- 2/2 known severe spinal stenosis on recent MRI from last admission 10/17/18.  - Repeat MRI is not indicated given no gross bladder/bowel incontinence, saddle anesthesia, or poor rectal tone on exam.  - On dexamethasone taper.  - Ortho consulted, recs appreciated.  No acute intervention, c/w steroid taper, and f/u outpatient with Dr. Huddleston  - Avoiding narcotics given ongoing constipation and no narcotic prescriptions will be given at discharge. - 2/2 known severe spinal stenosis on recent MRI from last admission 10/17/18.  - Repeat MRI is not indicated given no gross bladder/bowel incontinence, saddle anesthesia, or poor rectal tone on exam.  - On dexamethasone taper. Last day today.  - Ortho consulted, recs appreciated.  No acute intervention, c/w steroid taper, and f/u outpatient with Dr. Huddleston  - Avoiding narcotics given ongoing constipation and no narcotic prescriptions will be given at discharge.

## 2018-10-28 NOTE — PROGRESS NOTE ADULT - SUBJECTIVE AND OBJECTIVE BOX
Richy Martinez Ma, MD  PGY 1 Internal Medicine  Pager: [phone]    Patient is a 62y old  Male who presents with a chief complaint of Back pain (27 Oct 2018 08:54)      SUBJECTIVE / OVERNIGHT EVENTS:          MEDICATIONS  (STANDING):  amLODIPine   Tablet 10 milliGRAM(s) Oral daily  dexamethasone     Tablet   Oral   dexamethasone     Tablet 1 milliGRAM(s) Oral every 6 hours  docusate sodium 100 milliGRAM(s) Oral three times a day  enoxaparin Injectable 40 milliGRAM(s) SubCutaneous every 24 hours  gabapentin 300 milliGRAM(s) Oral three times a day  lidocaine   Patch 1 Patch Transdermal daily  nicotine -   7 mG/24Hr(s) Patch 1 patch Transdermal daily  polyethylene glycol 3350 17 Gram(s) Oral daily  senna 2 Tablet(s) Oral at bedtime    MEDICATIONS  (PRN):  acetaminophen   Tablet .. 650 milliGRAM(s) Oral every 6 hours PRN Mild Pain (1 - 3)  ibuprofen  Tablet. 600 milliGRAM(s) Oral two times a day PRN mild to moderate pain  tiZANidine 2 milliGRAM(s) Oral every 6 hours PRN spasm      Vital Signs Last 24 Hrs  T(C): 36.7 (28 Oct 2018 06:14), Max: 37.3 (27 Oct 2018 21:47)  T(F): 98 (28 Oct 2018 06:14), Max: 99.1 (27 Oct 2018 21:47)  HR: 55 (28 Oct 2018 06:14) (52 - 60)  BP: 139/80 (28 Oct 2018 06:14) (139/80 - 144/80)  BP(mean): --  RR: 18 (28 Oct 2018 06:14) (18 - 18)  SpO2: 100% (28 Oct 2018 06:14) (96% - 100%)      PHYSICAL EXAM  GENERAL: NAD, well-developed  HEAD:  Atraumatic, Normocephalic  EYES: EOMI, PERRLA, conjunctiva and sclera clear  NECK: Supple, No JVD  CHEST/LUNG: Clear to auscultation bilaterally; No wheeze  HEART: Regular rate and rhythm; No murmurs, rubs, or gallops  ABDOMEN: Soft, Nontender, Nondistended; Bowel sounds present  EXTREMITIES:  2+ Peripheral Pulses, No clubbing, cyanosis, or edema  PSYCH: AAOx3  SKIN: No rashes or lesions    CAPILLARY BLOOD GLUCOSE        I&O's Summary    27 Oct 2018 07:01  -  28 Oct 2018 07:00  --------------------------------------------------------  IN: 0 mL / OUT: 1600 mL / NET: -1600 mL        LABS:                    RADIOLOGY & ADDITIONAL TESTS:     MICROBIOLOGY:    ANTIMICROBIALS:    CONSULTS: Richy Martinez Ma, MD  PGY 1 Internal Medicine  Pager: [phone]    Patient is a 62y old  Male who presents with a chief complaint of Back pain (27 Oct 2018 08:54)      SUBJECTIVE / OVERNIGHT EVENTS: NAEO. Had a BM yesterday. Reports his right leg/knee pain is stable.          MEDICATIONS  (STANDING):  amLODIPine   Tablet 10 milliGRAM(s) Oral daily  dexamethasone     Tablet   Oral   dexamethasone     Tablet 1 milliGRAM(s) Oral every 6 hours  docusate sodium 100 milliGRAM(s) Oral three times a day  enoxaparin Injectable 40 milliGRAM(s) SubCutaneous every 24 hours  gabapentin 300 milliGRAM(s) Oral three times a day  lidocaine   Patch 1 Patch Transdermal daily  nicotine -   7 mG/24Hr(s) Patch 1 patch Transdermal daily  polyethylene glycol 3350 17 Gram(s) Oral daily  senna 2 Tablet(s) Oral at bedtime    MEDICATIONS  (PRN):  acetaminophen   Tablet .. 650 milliGRAM(s) Oral every 6 hours PRN Mild Pain (1 - 3)  ibuprofen  Tablet. 600 milliGRAM(s) Oral two times a day PRN mild to moderate pain  tiZANidine 2 milliGRAM(s) Oral every 6 hours PRN spasm      Vital Signs Last 24 Hrs  T(C): 36.7 (28 Oct 2018 06:14), Max: 37.3 (27 Oct 2018 21:47)  T(F): 98 (28 Oct 2018 06:14), Max: 99.1 (27 Oct 2018 21:47)  HR: 55 (28 Oct 2018 06:14) (52 - 60)  BP: 139/80 (28 Oct 2018 06:14) (139/80 - 144/80)  BP(mean): --  RR: 18 (28 Oct 2018 06:14) (18 - 18)  SpO2: 100% (28 Oct 2018 06:14) (96% - 100%)      PHYSICAL EXAM  GENERAL: NAD, well-developed  HEAD:  Atraumatic, Normocephalic  EYES: EOMI, PERRLA, conjunctiva and sclera clear  NECK: Supple, No JVD  CHEST/LUNG: Clear to auscultation bilaterally; No wheeze  HEART: Regular rate and rhythm; No murmurs, rubs, or gallops  ABDOMEN: Soft, Nontender, Nondistended; Bowel sounds present  EXTREMITIES:  2+ Peripheral Pulses, No clubbing, cyanosis, or edema  PSYCH: AAOx3  SKIN: No rashes or lesions    CAPILLARY BLOOD GLUCOSE        I&O's Summary    27 Oct 2018 07:01  -  28 Oct 2018 07:00  --------------------------------------------------------  IN: 0 mL / OUT: 1600 mL / NET: -1600 mL        LABS:                    RADIOLOGY & ADDITIONAL TESTS:     MICROBIOLOGY:    ANTIMICROBIALS:    CONSULTS: Richy Martinez Ma, MD  PGY 1 Internal Medicine  Pager: [phone]    Patient is a 62y old  Male who presents with a chief complaint of Back pain (27 Oct 2018 08:54)      SUBJECTIVE / OVERNIGHT EVENTS: NAEO. Had a BM yesterday. Reports his right leg/knee pain is stable.          MEDICATIONS  (STANDING):  amLODIPine   Tablet 10 milliGRAM(s) Oral daily  dexamethasone     Tablet   Oral   dexamethasone     Tablet 1 milliGRAM(s) Oral every 6 hours  docusate sodium 100 milliGRAM(s) Oral three times a day  enoxaparin Injectable 40 milliGRAM(s) SubCutaneous every 24 hours  gabapentin 300 milliGRAM(s) Oral three times a day  lidocaine   Patch 1 Patch Transdermal daily  nicotine -   7 mG/24Hr(s) Patch 1 patch Transdermal daily  polyethylene glycol 3350 17 Gram(s) Oral daily  senna 2 Tablet(s) Oral at bedtime    MEDICATIONS  (PRN):  acetaminophen   Tablet .. 650 milliGRAM(s) Oral every 6 hours PRN Mild Pain (1 - 3)  ibuprofen  Tablet. 600 milliGRAM(s) Oral two times a day PRN mild to moderate pain  tiZANidine 2 milliGRAM(s) Oral every 6 hours PRN spasm      Vital Signs Last 24 Hrs  T(C): 36.7 (28 Oct 2018 06:14), Max: 37.3 (27 Oct 2018 21:47)  T(F): 98 (28 Oct 2018 06:14), Max: 99.1 (27 Oct 2018 21:47)  HR: 55 (28 Oct 2018 06:14) (52 - 60)  BP: 139/80 (28 Oct 2018 06:14) (139/80 - 144/80)  BP(mean): --  RR: 18 (28 Oct 2018 06:14) (18 - 18)  SpO2: 100% (28 Oct 2018 06:14) (96% - 100%)      PHYSICAL EXAM  Gen: awake, alert  HENT: neck supple  Eye: sclerae clear bilaterally  CV: bradycardic, regular rhythm, no murmurs  Pulm: CTAB, no w/r/r auscultated  Abd: +BS, soft, NT, ND  Skin: warm, dry  MSK: R knee with minimal effusion below patella,     passive ROM full, active ROM full, no TTP of patella, no patellar laxity    Ext: no LE edema  Neuro: answering questions appropriately, following commands appropriately, recent and remote memory intact  Psych: normal mood / affect      CAPILLARY BLOOD GLUCOSE        I&O's Summary    27 Oct 2018 07:01  -  28 Oct 2018 07:00  --------------------------------------------------------  IN: 0 mL / OUT: 1600 mL / NET: -1600 mL        LABS:                    RADIOLOGY & ADDITIONAL TESTS:     MICROBIOLOGY:    ANTIMICROBIALS:    CONSULTS: Richy Martinez Ma, MD  PGY 1 Internal Medicine  Pager: [phone]    Patient is a 62y old  Male who presents with a chief complaint of Back pain (27 Oct 2018 08:54)      SUBJECTIVE / OVERNIGHT EVENTS: NAEO. Had a BM yesterday. Reports his right leg/knee pain is stable/chronic. Denies fever, chills, chest pain, or SOB.          MEDICATIONS  (STANDING):  amLODIPine   Tablet 10 milliGRAM(s) Oral daily  dexamethasone     Tablet   Oral   dexamethasone     Tablet 1 milliGRAM(s) Oral every 6 hours  docusate sodium 100 milliGRAM(s) Oral three times a day  enoxaparin Injectable 40 milliGRAM(s) SubCutaneous every 24 hours  gabapentin 300 milliGRAM(s) Oral three times a day  lidocaine   Patch 1 Patch Transdermal daily  nicotine -   7 mG/24Hr(s) Patch 1 patch Transdermal daily  polyethylene glycol 3350 17 Gram(s) Oral daily  senna 2 Tablet(s) Oral at bedtime    MEDICATIONS  (PRN):  acetaminophen   Tablet .. 650 milliGRAM(s) Oral every 6 hours PRN Mild Pain (1 - 3)  ibuprofen  Tablet. 600 milliGRAM(s) Oral two times a day PRN mild to moderate pain  tiZANidine 2 milliGRAM(s) Oral every 6 hours PRN spasm      Vital Signs Last 24 Hrs  T(C): 36.7 (28 Oct 2018 06:14), Max: 37.3 (27 Oct 2018 21:47)  T(F): 98 (28 Oct 2018 06:14), Max: 99.1 (27 Oct 2018 21:47)  HR: 55 (28 Oct 2018 06:14) (52 - 60)  BP: 139/80 (28 Oct 2018 06:14) (139/80 - 144/80)  BP(mean): --  RR: 18 (28 Oct 2018 06:14) (18 - 18)  SpO2: 100% (28 Oct 2018 06:14) (96% - 100%)      PHYSICAL EXAM  Gen: awake, alert  HENT: neck supple  Eye: sclerae clear bilaterally  CV: bradycardic, regular rhythm, no murmurs  Pulm: CTAB, no w/r/r auscultated  Abd: +BS, soft, NT, ND  Skin: warm, dry  BACK: no spinal tenderness, no paraspinal muscle tenderness  MSK: R knee with minimal effusion below patella, passive ROM full, active ROM full, no TTP of patella, no patellar laxity, no obvious bone deformity  Ext: no LE edema  Neuro: answering questions appropriately, following commands appropriately, nonfocal  Psych: normal mood / affect      CAPILLARY BLOOD GLUCOSE        I&O's Summary    27 Oct 2018 07:01  -  28 Oct 2018 07:00  --------------------------------------------------------  IN: 0 mL / OUT: 1600 mL / NET: -1600 mL        LABS:                    RADIOLOGY & ADDITIONAL TESTS:     MICROBIOLOGY:    ANTIMICROBIALS:    CONSULTS: Richy Martinez Ma, MD  PGY 1 Internal Medicine  Pager: [phone]    Patient is a 62y old  Male who presents with a chief complaint of Back pain (27 Oct 2018 08:54)      SUBJECTIVE / OVERNIGHT EVENTS: NAEO. Had a BM yesterday. Reports his right leg/knee pain is stable/chronic. Denies fever, chills, chest pain, or SOB.      MEDICATIONS  (STANDING):  amLODIPine   Tablet 10 milliGRAM(s) Oral daily  dexamethasone     Tablet   Oral   dexamethasone     Tablet 1 milliGRAM(s) Oral every 6 hours  docusate sodium 100 milliGRAM(s) Oral three times a day  enoxaparin Injectable 40 milliGRAM(s) SubCutaneous every 24 hours  gabapentin 300 milliGRAM(s) Oral three times a day  lidocaine   Patch 1 Patch Transdermal daily  nicotine -   7 mG/24Hr(s) Patch 1 patch Transdermal daily  polyethylene glycol 3350 17 Gram(s) Oral daily  senna 2 Tablet(s) Oral at bedtime    MEDICATIONS  (PRN):  acetaminophen   Tablet .. 650 milliGRAM(s) Oral every 6 hours PRN Mild Pain (1 - 3)  ibuprofen  Tablet. 600 milliGRAM(s) Oral two times a day PRN mild to moderate pain  tiZANidine 2 milliGRAM(s) Oral every 6 hours PRN spasm      Vital Signs Last 24 Hrs  T(C): 36.7 (28 Oct 2018 06:14), Max: 37.3 (27 Oct 2018 21:47)  T(F): 98 (28 Oct 2018 06:14), Max: 99.1 (27 Oct 2018 21:47)  HR: 55 (28 Oct 2018 06:14) (52 - 60)  BP: 139/80 (28 Oct 2018 06:14) (139/80 - 144/80)  BP(mean): --  RR: 18 (28 Oct 2018 06:14) (18 - 18)  SpO2: 100% (28 Oct 2018 06:14) (96% - 100%)      PHYSICAL EXAM  Gen: awake, alert  HENT: neck supple  Eye: sclerae clear bilaterally  CV: bradycardic, regular rhythm, no murmurs  Pulm: CTAB, no w/r/r auscultated  Abd: +BS, soft, NT, ND  Skin: warm, dry  BACK: no spinal tenderness, no paraspinal muscle tenderness  MSK: R knee with minimal effusion below patella, passive ROM full, active ROM full, no TTP of patella, no patellar laxity, no obvious bone deformity  Ext: no LE edema  Neuro: answering questions appropriately, following commands appropriately, nonfocal  Psych: normal mood / affect      CAPILLARY BLOOD GLUCOSE        I&O's Summary    27 Oct 2018 07:01  -  28 Oct 2018 07:00  --------------------------------------------------------  IN: 0 mL / OUT: 1600 mL / NET: -1600 mL        LABS:                    RADIOLOGY & ADDITIONAL TESTS:     MICROBIOLOGY:    ANTIMICROBIALS:    CONSULTS:

## 2018-10-28 NOTE — PROGRESS NOTE ADULT - PROBLEM SELECTOR PLAN 3
- Continue tizanidine, gabapentin, ibuprofen, dexamethasone.  - Pain regimen per Pain Mgmt consult, appreciate involvement.  - Avoiding narcotics given ongoing constipation and no narcotic prescriptions will be given at discharge.  - Bowel regimen. - Continue tizanidine, gabapentin, ibuprofen, dexamethasone.  - Pain regimen per Pain Mgmt consult, appreciate involvement.  - Avoiding narcotics given ongoing constipation and no narcotic prescriptions will be given at discharge.  - C/w bowel regimen.

## 2018-10-29 PROCEDURE — 99233 SBSQ HOSP IP/OBS HIGH 50: CPT

## 2018-10-29 RX ADMIN — Medication 600 MILLIGRAM(S): at 22:35

## 2018-10-29 RX ADMIN — LIDOCAINE 1 PATCH: 4 CREAM TOPICAL at 01:00

## 2018-10-29 RX ADMIN — Medication 1 MILLIGRAM(S): at 02:09

## 2018-10-29 RX ADMIN — LIDOCAINE 1 PATCH: 4 CREAM TOPICAL at 13:03

## 2018-10-29 RX ADMIN — Medication 1 PATCH: at 13:04

## 2018-10-29 RX ADMIN — GABAPENTIN 300 MILLIGRAM(S): 400 CAPSULE ORAL at 14:26

## 2018-10-29 RX ADMIN — Medication 100 MILLIGRAM(S): at 21:49

## 2018-10-29 RX ADMIN — Medication 600 MILLIGRAM(S): at 21:48

## 2018-10-29 RX ADMIN — Medication 650 MILLIGRAM(S): at 13:30

## 2018-10-29 RX ADMIN — Medication 650 MILLIGRAM(S): at 13:03

## 2018-10-29 RX ADMIN — TIZANIDINE 2 MILLIGRAM(S): 4 TABLET ORAL at 21:49

## 2018-10-29 RX ADMIN — Medication 100 MILLIGRAM(S): at 07:35

## 2018-10-29 RX ADMIN — Medication 600 MILLIGRAM(S): at 05:35

## 2018-10-29 RX ADMIN — SENNA PLUS 2 TABLET(S): 8.6 TABLET ORAL at 21:48

## 2018-10-29 RX ADMIN — ENOXAPARIN SODIUM 40 MILLIGRAM(S): 100 INJECTION SUBCUTANEOUS at 07:35

## 2018-10-29 RX ADMIN — GABAPENTIN 300 MILLIGRAM(S): 400 CAPSULE ORAL at 21:48

## 2018-10-29 RX ADMIN — Medication 600 MILLIGRAM(S): at 05:04

## 2018-10-29 RX ADMIN — TIZANIDINE 2 MILLIGRAM(S): 4 TABLET ORAL at 13:03

## 2018-10-29 RX ADMIN — GABAPENTIN 300 MILLIGRAM(S): 400 CAPSULE ORAL at 07:35

## 2018-10-29 RX ADMIN — Medication 1 PATCH: at 07:38

## 2018-10-29 RX ADMIN — AMLODIPINE BESYLATE 10 MILLIGRAM(S): 2.5 TABLET ORAL at 07:35

## 2018-10-29 RX ADMIN — Medication 1 PATCH: at 13:06

## 2018-10-29 NOTE — PROGRESS NOTE ADULT - PROBLEM SELECTOR PLAN 3
- Continue tizanidine, gabapentin, ibuprofen, dexamethasone.  - Pain regimen per Pain Mgmt consult, appreciate involvement.  - Avoiding narcotics given ongoing constipation and no narcotic prescriptions will be given at discharge.  - C/w bowel regimen.

## 2018-10-29 NOTE — PROGRESS NOTE ADULT - PROBLEM SELECTOR PLAN 7
- Stable.  Unclear if bullae present from recent fall  -  appears isolated, without associated tenderness or warmth or discharge  - Will continue to monitor.

## 2018-10-29 NOTE — PROGRESS NOTE ADULT - SUBJECTIVE AND OBJECTIVE BOX
Patient is a 62y old  Male who presents with a chief complaint of Back pain (29 Oct 2018 09:36)      SUBJECTIVE / OVERNIGHT EVENTS: No acute events overnight. Still with lower back pain and right leg pain, worse with movement. [place] any other complaints at this time     MEDICATIONS  (STANDING):  amLODIPine   Tablet 10 milliGRAM(s) Oral daily  docusate sodium 100 milliGRAM(s) Oral three times a day  enoxaparin Injectable 40 milliGRAM(s) SubCutaneous every 24 hours  gabapentin 300 milliGRAM(s) Oral three times a day  lidocaine   Patch 1 Patch Transdermal daily  nicotine -   7 mG/24Hr(s) Patch 1 patch Transdermal daily  polyethylene glycol 3350 17 Gram(s) Oral daily  senna 2 Tablet(s) Oral at bedtime    MEDICATIONS  (PRN):  acetaminophen   Tablet .. 650 milliGRAM(s) Oral every 6 hours PRN Mild Pain (1 - 3)  ibuprofen  Tablet. 600 milliGRAM(s) Oral two times a day PRN mild to moderate pain  tiZANidine 2 milliGRAM(s) Oral every 6 hours PRN spasm      Vital Signs Last 24 Hrs  T(C): 36.7 (29 Oct 2018 07:30), Max: 36.7 (28 Oct 2018 21:28)  T(F): 98 (29 Oct 2018 07:30), Max: 98.1 (28 Oct 2018 21:28)  HR: 55 (29 Oct 2018 07:30) (51 - 60)  BP: 110/70 (29 Oct 2018 07:30) (110/70 - 134/78)  BP(mean): --  RR: 18 (29 Oct 2018 07:30) (18 - 18)  SpO2: 100% (29 Oct 2018 07:30) (100% - 100%)  CAPILLARY BLOOD GLUCOSE        I&O's Summary    28 Oct 2018 07:01  -  29 Oct 2018 07:00  --------------------------------------------------------  IN: 0 mL / OUT: 1200 mL / NET: -1200 mL        PHYSICAL EXAM:  Gen: awake, alert  HENT: neck supple  Eye: sclerae clear bilaterally  CV:Regular rate, & rhythm, no murmurs  Pulm: CTAB,   Abd: +BS, soft, NT, ND  Skin: warm, dry  BACK: no spinal tenderness, no paraspinal muscle tenderness  MSK: R knee with minimal effusion below patella, passive ROM full, active ROM full, no TTP of patella, no patellar laxity, no obvious bone deformity  Ext: no LE edema  Neuro: answering questions appropriately, following commands appropriately, nonfocal  Psych: normal mood / affect    LABS:                    RADIOLOGY & ADDITIONAL TESTS:    Imaging Personally Reviewed:    Consultant(s) Notes Reviewed:      Care Discussed with Consultants/Other Providers:

## 2018-10-29 NOTE — PROGRESS NOTE ADULT - PROBLEM SELECTOR PLAN 1
- 2/2 known severe spinal stenosis on recent MRI from last admission 10/17/18.  - Repeat MRI is not indicated given no gross bladder/bowel incontinence, saddle anesthesia, or poor rectal tone on exam.  - On dexamethasone taper. Last day today.  - Ortho consulted, recs appreciated.  No acute intervention, c/w steroid taper, and f/u outpatient with Dr. Huddleston  - Avoiding narcotics given ongoing constipation and no narcotic prescriptions will be given at discharge.

## 2018-10-29 NOTE — PROGRESS NOTE ADULT - ATTENDING COMMENTS
42 min discharge time
Patient was seen and examined personally by me. I have discussed the plan and reviewed the resident's note and agree with the above physical exam findings including assessment and plan except as indicated below.    Continue regimen as recommended by pain management. No opiates  Xray right knee with moderate to large sized enthesophyte, ROM of knee intact. Continue NSAIDs prn  PT recs: rehab  Dispo: pending rehab
Patient was seen and examined personally by me. I have discussed the plan and reviewed the resident's note and agree with the above physical exam findings including assessment and plan except as indicated below.    Steroid taper, avoid opiates-not indicated for spinal stenosis pain  Pain management consult appreciated. Per patient, reports 9/10 pain in right leg but is bearable. New pain regimen helping with pain  Patient requests rehab.  PT to reevaluate
Patient was seen and examined personally by me. I have discussed the plan and reviewed the resident's note and agree with the above physical exam findings including assessment and plan except as indicated below.    Pain controlled on current regimen. Continue regimen as recommended by pain management. No opiates  f/u right knee Xr but no significant swelling noted, has full ROM  PT recs: rehab  Dispo: pending rehab
Patient was seen and examined personally by me. I have discussed the plan and reviewed the resident's note and agree with the above physical exam findings including assessment and plan except as indicated below.    Steroid taper, avoid opiates-not indicated for spinal stenosis pain  Pain management consult  PT recs: home PT but only ambulated 10 feet. Will ask to reevaluate

## 2018-10-29 NOTE — PROGRESS NOTE ADULT - ASSESSMENT
62M h/o preDM, hearing impairment, chronic back pain and bilateral lower extremity pain in setting of recently diagnosed severe central canal stenosis at L3-L5 adm 10/24 for acute on chronic lower back pain c/b difficulty walking, falls, and bilateral LE weakness likely 2/2 known central canal stenosis. Plan for d/c to rehab facility per PT

## 2018-10-30 PROCEDURE — 99232 SBSQ HOSP IP/OBS MODERATE 35: CPT

## 2018-10-30 RX ORDER — IBUPROFEN 200 MG
400 TABLET ORAL EVERY 8 HOURS
Qty: 0 | Refills: 0 | Status: DISCONTINUED | OUTPATIENT
Start: 2018-10-30 | End: 2018-10-31

## 2018-10-30 RX ADMIN — AMLODIPINE BESYLATE 10 MILLIGRAM(S): 2.5 TABLET ORAL at 05:57

## 2018-10-30 RX ADMIN — Medication 400 MILLIGRAM(S): at 21:25

## 2018-10-30 RX ADMIN — Medication 400 MILLIGRAM(S): at 22:25

## 2018-10-30 RX ADMIN — GABAPENTIN 300 MILLIGRAM(S): 400 CAPSULE ORAL at 21:25

## 2018-10-30 RX ADMIN — LIDOCAINE 1 PATCH: 4 CREAM TOPICAL at 19:56

## 2018-10-30 RX ADMIN — Medication 100 MILLIGRAM(S): at 21:25

## 2018-10-30 RX ADMIN — Medication 600 MILLIGRAM(S): at 12:32

## 2018-10-30 RX ADMIN — GABAPENTIN 300 MILLIGRAM(S): 400 CAPSULE ORAL at 12:32

## 2018-10-30 RX ADMIN — Medication 100 MILLIGRAM(S): at 05:57

## 2018-10-30 RX ADMIN — Medication 600 MILLIGRAM(S): at 00:00

## 2018-10-30 RX ADMIN — Medication 1 PATCH: at 12:23

## 2018-10-30 RX ADMIN — Medication 600 MILLIGRAM(S): at 05:57

## 2018-10-30 RX ADMIN — Medication 600 MILLIGRAM(S): at 13:15

## 2018-10-30 RX ADMIN — SENNA PLUS 2 TABLET(S): 8.6 TABLET ORAL at 21:25

## 2018-10-30 RX ADMIN — TIZANIDINE 2 MILLIGRAM(S): 4 TABLET ORAL at 21:25

## 2018-10-30 RX ADMIN — GABAPENTIN 300 MILLIGRAM(S): 400 CAPSULE ORAL at 05:57

## 2018-10-30 RX ADMIN — ENOXAPARIN SODIUM 40 MILLIGRAM(S): 100 INJECTION SUBCUTANEOUS at 05:57

## 2018-10-30 RX ADMIN — LIDOCAINE 1 PATCH: 4 CREAM TOPICAL at 12:24

## 2018-10-30 RX ADMIN — Medication 1 PATCH: at 12:29

## 2018-10-30 RX ADMIN — Medication 1 PATCH: at 19:56

## 2018-10-30 NOTE — PROGRESS NOTE ADULT - SUBJECTIVE AND OBJECTIVE BOX
Patient is a 62y old  Male who presents with a chief complaint of Back pain (29 Oct 2018 09:36)      SUBJECTIVE / OVERNIGHT EVENTS: Still b/l LE pain worse with movement. Also c/o of right knee pain. Otherwise no current complaints. Passing BM, tolerating PO.     MEDICATIONS  (STANDING):  amLODIPine   Tablet 10 milliGRAM(s) Oral daily  docusate sodium 100 milliGRAM(s) Oral three times a day  enoxaparin Injectable 40 milliGRAM(s) SubCutaneous every 24 hours  gabapentin 300 milliGRAM(s) Oral three times a day  lidocaine   Patch 1 Patch Transdermal daily  nicotine -   7 mG/24Hr(s) Patch 1 patch Transdermal daily  polyethylene glycol 3350 17 Gram(s) Oral daily  senna 2 Tablet(s) Oral at bedtime    MEDICATIONS  (PRN):  acetaminophen   Tablet .. 650 milliGRAM(s) Oral every 6 hours PRN Mild Pain (1 - 3)  ibuprofen  Tablet. 600 milliGRAM(s) Oral two times a day PRN mild to moderate pain  tiZANidine 2 milliGRAM(s) Oral every 6 hours PRN spasm      Vital Signs Last 24 Hrs  T(C): 36.3 (30 Oct 2018 06:17), Max: 37.3 (29 Oct 2018 22:05)  T(F): 97.4 (30 Oct 2018 06:17), Max: 99.2 (29 Oct 2018 22:05)  HR: 58 (30 Oct 2018 06:17) (52 - 68)  BP: 132/85 (30 Oct 2018 06:17) (112/59 - 142/84)  BP(mean): --  RR: 18 (30 Oct 2018 06:17) (18 - 18)  SpO2: 100% (30 Oct 2018 06:17) (99% - 100%)  CAPILLARY BLOOD GLUCOSE        I&O's Summary      PHYSICAL EXAM:  Gen: awake, alert  HENT: neck supple  Eye: sclerae clear bilaterally  CV:Regular rate, & rhythm, no murmurs  Pulm: CTAB,   Abd: +BS, soft, NT, ND  Skin: warm, dry  BACK: no spinal tenderness, no paraspinal muscle tenderness  MSK:  RLE:  passive ROM full, active ROM full, no TTP of patella, no patellar laxity, no obvious bone deformity  Ext: no LE edema  Neuro: answering questions appropriately, following commands appropriately, nonfocal  Psych: normal mood / affect    LABS:                    RADIOLOGY & ADDITIONAL TESTS:    Imaging Personally Reviewed:    Consultant(s) Notes Reviewed:      Care Discussed with Consultants/Other Providers:

## 2018-10-31 PROCEDURE — 99233 SBSQ HOSP IP/OBS HIGH 50: CPT

## 2018-10-31 RX ORDER — ACETAMINOPHEN 500 MG
975 TABLET ORAL EVERY 6 HOURS
Qty: 0 | Refills: 0 | Status: DISCONTINUED | OUTPATIENT
Start: 2018-10-31 | End: 2018-11-01

## 2018-10-31 RX ORDER — IBUPROFEN 200 MG
400 TABLET ORAL EVERY 6 HOURS
Qty: 0 | Refills: 0 | Status: DISCONTINUED | OUTPATIENT
Start: 2018-10-31 | End: 2018-11-01

## 2018-10-31 RX ORDER — ACETAMINOPHEN 500 MG
1000 TABLET ORAL EVERY 6 HOURS
Qty: 0 | Refills: 0 | Status: DISCONTINUED | OUTPATIENT
Start: 2018-10-31 | End: 2018-10-31

## 2018-10-31 RX ORDER — GABAPENTIN 400 MG/1
400 CAPSULE ORAL THREE TIMES A DAY
Qty: 0 | Refills: 0 | Status: DISCONTINUED | OUTPATIENT
Start: 2018-10-31 | End: 2018-11-01

## 2018-10-31 RX ADMIN — Medication 100 MILLIGRAM(S): at 07:10

## 2018-10-31 RX ADMIN — Medication 400 MILLIGRAM(S): at 22:02

## 2018-10-31 RX ADMIN — Medication 400 MILLIGRAM(S): at 13:40

## 2018-10-31 RX ADMIN — Medication 400 MILLIGRAM(S): at 07:11

## 2018-10-31 RX ADMIN — Medication 975 MILLIGRAM(S): at 23:00

## 2018-10-31 RX ADMIN — GABAPENTIN 300 MILLIGRAM(S): 400 CAPSULE ORAL at 13:36

## 2018-10-31 RX ADMIN — GABAPENTIN 400 MILLIGRAM(S): 400 CAPSULE ORAL at 22:01

## 2018-10-31 RX ADMIN — Medication 975 MILLIGRAM(S): at 17:16

## 2018-10-31 RX ADMIN — Medication 100 MILLIGRAM(S): at 13:36

## 2018-10-31 RX ADMIN — AMLODIPINE BESYLATE 10 MILLIGRAM(S): 2.5 TABLET ORAL at 07:09

## 2018-10-31 RX ADMIN — LIDOCAINE 1 PATCH: 4 CREAM TOPICAL at 13:35

## 2018-10-31 RX ADMIN — Medication 1 PATCH: at 13:41

## 2018-10-31 RX ADMIN — Medication 400 MILLIGRAM(S): at 23:02

## 2018-10-31 RX ADMIN — ENOXAPARIN SODIUM 40 MILLIGRAM(S): 100 INJECTION SUBCUTANEOUS at 07:09

## 2018-10-31 RX ADMIN — LIDOCAINE 1 PATCH: 4 CREAM TOPICAL at 18:26

## 2018-10-31 RX ADMIN — SENNA PLUS 2 TABLET(S): 8.6 TABLET ORAL at 22:01

## 2018-10-31 RX ADMIN — Medication 400 MILLIGRAM(S): at 06:06

## 2018-10-31 RX ADMIN — Medication 1 PATCH: at 18:26

## 2018-10-31 RX ADMIN — TIZANIDINE 2 MILLIGRAM(S): 4 TABLET ORAL at 22:02

## 2018-10-31 RX ADMIN — Medication 100 MILLIGRAM(S): at 22:01

## 2018-10-31 RX ADMIN — GABAPENTIN 300 MILLIGRAM(S): 400 CAPSULE ORAL at 07:10

## 2018-10-31 RX ADMIN — Medication 400 MILLIGRAM(S): at 14:15

## 2018-10-31 RX ADMIN — LIDOCAINE 1 PATCH: 4 CREAM TOPICAL at 01:25

## 2018-10-31 RX ADMIN — Medication 975 MILLIGRAM(S): at 22:02

## 2018-10-31 RX ADMIN — TIZANIDINE 2 MILLIGRAM(S): 4 TABLET ORAL at 15:51

## 2018-10-31 RX ADMIN — Medication 975 MILLIGRAM(S): at 16:19

## 2018-10-31 RX ADMIN — Medication 1 PATCH: at 13:36

## 2018-10-31 NOTE — DIETITIAN INITIAL EVALUATION ADULT. - NS AS NUTRI INTERV MEALS SNACK
1. Continue Regular diet. 2. Please Encourage po intake, assist with meals and menu selections, provide alternatives PRN. 3. Continue bowel regimen PRN. 4. Monitor weights, labs, BM's, skin integrity, p.o. intake./General/healthful diet

## 2018-10-31 NOTE — PROGRESS NOTE ADULT - PROBLEM SELECTOR PLAN 8
- Enoxaparin for dvt prophylaxis     Dispo: Rehab per PT - Enoxaparin for dvt prophylaxis     Dispo: Rehab per PT    Andriy Montes MD - PGY1  Department of Internal Medicine  Pager - [phone]

## 2018-10-31 NOTE — DIETITIAN INITIAL EVALUATION ADULT. - PROBLEM SELECTOR PLAN 4
Likely reactive in setting of pain and recent steroid use; No clinical evidence of infection; UA not suggestive of UTI;   - Repeat CBC in AM  - No fever, tachycardia to suggest infectious source

## 2018-10-31 NOTE — DIETITIAN INITIAL EVALUATION ADULT. - ORAL INTAKE PTA
good/Patient was unable to prepare and cook meals due to limited mobility. Patient was mainly ordering delivery food to eat.

## 2018-10-31 NOTE — DIETITIAN INITIAL EVALUATION ADULT. - PERTINENT MEDS FT
MEDICATIONS  (STANDING):  amLODIPine   Tablet 10 milliGRAM(s) Oral daily  docusate sodium 100 milliGRAM(s) Oral three times a day  enoxaparin Injectable 40 milliGRAM(s) SubCutaneous every 24 hours  gabapentin 400 milliGRAM(s) Oral three times a day  lidocaine   Patch 1 Patch Transdermal daily  nicotine -   7 mG/24Hr(s) Patch 1 patch Transdermal daily  polyethylene glycol 3350 17 Gram(s) Oral daily  senna 2 Tablet(s) Oral at bedtime    MEDICATIONS  (PRN):  acetaminophen   Tablet .. 1000 milliGRAM(s) Oral every 6 hours PRN Mild Pain (1 - 3), Moderate Pain (4 - 6)  ibuprofen  Tablet. 400 milliGRAM(s) Oral every 6 hours PRN Moderate Pain (4 - 6)  tiZANidine 2 milliGRAM(s) Oral every 6 hours PRN spasm

## 2018-10-31 NOTE — DIETITIAN INITIAL EVALUATION ADULT. - PROBLEM SELECTOR PLAN 3
Elevated BUN: Cr ratio >20 suggestive of dehydration  - Plan to provide IVF overnight  - Recheck in AM

## 2018-10-31 NOTE — DIETITIAN INITIAL EVALUATION ADULT. - PROBLEM SELECTOR PLAN 2
Has uncontrolled pain requiring IV pain control;  - Start oxycodone ER 10mg BID, and Dilaudid 1 IVP Q6 for breakthrough pain.   - Ibuprofen, lidocaine patches, gabapentin 300 TID, and methocarbamol  - Avoiding morphine due to concerns of symptomatic bradycardia  -  ECG - sinus bradycardia   - Will need Q4H VS due to concerns of bradycardia with opioids  - bowel regimen for opioid induced constipation;

## 2018-10-31 NOTE — PROGRESS NOTE ADULT - SUBJECTIVE AND OBJECTIVE BOX
Patient is a 62y old  Male who presents with a chief complaint of Back pain (30 Oct 2018 10:02)      SUBJECTIVE / OVERNIGHT EVENTS: No acute events overnight. Still with b/l LE pain and back pain, currently controlled. No other complaints this AM     MEDICATIONS  (STANDING):  amLODIPine   Tablet 10 milliGRAM(s) Oral daily  docusate sodium 100 milliGRAM(s) Oral three times a day  enoxaparin Injectable 40 milliGRAM(s) SubCutaneous every 24 hours  gabapentin 300 milliGRAM(s) Oral three times a day  lidocaine   Patch 1 Patch Transdermal daily  nicotine -   7 mG/24Hr(s) Patch 1 patch Transdermal daily  polyethylene glycol 3350 17 Gram(s) Oral daily  senna 2 Tablet(s) Oral at bedtime    MEDICATIONS  (PRN):  acetaminophen   Tablet .. 650 milliGRAM(s) Oral every 6 hours PRN Mild Pain (1 - 3)  ibuprofen  Tablet. 400 milliGRAM(s) Oral every 8 hours PRN Moderate Pain (4 - 6)  tiZANidine 2 milliGRAM(s) Oral every 6 hours PRN spasm      Vital Signs Last 24 Hrs  T(C): 36.4 (31 Oct 2018 05:21), Max: 36.9 (30 Oct 2018 13:30)  T(F): 97.5 (31 Oct 2018 05:21), Max: 98.5 (30 Oct 2018 13:30)  HR: 56 (31 Oct 2018 05:21) (56 - 65)  BP: 124/85 (31 Oct 2018 05:21) (121/84 - 146/85)  BP(mean): --  RR: 18 (31 Oct 2018 05:21) (17 - 18)  SpO2: 98% (31 Oct 2018 05:21) (98% - 100%)  CAPILLARY BLOOD GLUCOSE        I&O's Summary    30 Oct 2018 07:01  -  31 Oct 2018 07:00  --------------------------------------------------------  IN: 0 mL / OUT: 300 mL / NET: -300 mL        PHYSICAL EXAM:  Gen: awake, alert  HENT: neck supple  Eye: sclerae clear bilaterally  CV:Regular rate, & rhythm, no murmurs  Pulm: CTAB,   Abd: + soft, NT, ND  Skin: warm, dry  BACK: no spinal tenderness, no paraspinal muscle tenderness  MSK:  RLE:  passive ROM full, active ROM full, no TTP of patella, no patellar laxity, no obvious bone deformity  Ext: no LE edema  Neuro: answering questions appropriately, following commands appropriately, nonfocal  Psych: normal mood / affect    LABS:                    RADIOLOGY & ADDITIONAL TESTS:    Imaging Personally Reviewed:    Consultant(s) Notes Reviewed:      Care Discussed with Consultants/Other Providers:

## 2018-10-31 NOTE — PROGRESS NOTE ADULT - PROBLEM SELECTOR PLAN 6
- now resolved   -  was isolated, without associated tenderness or warmth or discharge  - Will continue to monitor.

## 2018-10-31 NOTE — DIETITIAN INITIAL EVALUATION ADULT. - OTHER INFO
Initial Dietitian Evaluation 2/2 to extended length of stay. Per chart review patient with medical history of prediabetes, hearing impairment, chronic back pain and bilateral lower extremity pain in setting of recently diagnosed severe central canal stenosis at L3-L5, who presents with worsening back pain. Patient reports good appetite and PO intake PTA. Patient reports due to limited mobility he was consuming delievy food or food cooked for him. Patient states that he food allergies to pork, seafood and oatmeal, ?intolerance to chocolate. Patient unsure if he has had weight changes PTA. Notes UBW is 235# but could not report how long ago. Admit weight noted to be ~200#. Given limited mobility at home patient possible with deconditioning. Tolerating diet in-house. Eating 100% of meals. Reports some nausea likely due to medications but no vomiting. On bowel regimen to alleviate constipation. Encouraged PO intake as tolerated. Patient pending d/c to rehab.

## 2018-10-31 NOTE — DIETITIAN INITIAL EVALUATION ADULT. - PROBLEM SELECTOR PLAN 5
Presents with pain of Rt foot of unclear etiology   - X-ray preliminary findings without acute fractures  - f/u official report   - PT eval  - Pain control

## 2018-10-31 NOTE — DIETITIAN INITIAL EVALUATION ADULT. - PROBLEM SELECTOR PLAN 7
- Unclear if bullae present from recent fall  - Consider dermatology consult if more bullae appear during admission (suggestive of systemic process) however appears isolated, without associated tenderness or warmth   -Monitor

## 2018-10-31 NOTE — PROGRESS NOTE ADULT - PROBLEM SELECTOR PLAN 1
- 2/2 known severe spinal stenosis on recent MRI from last admission 10/17/18.  - Repeat MRI is not indicated given no gross bladder/bowel incontinence, saddle anesthesia, or poor rectal tone on exam.  - On dexamethasone taper. Last day today.  - Ortho consulted, recs appreciated.  No acute intervention, c/w steroid taper, and f/u outpatient with Dr. Huddleston  - Avoiding narcotics given ongoing constipation and no narcotic prescriptions will be given at discharge. - 2/2 known severe spinal stenosis on recent MRI from last admission 10/17/18.  - Repeat MRI is not indicated given no gross bladder/bowel incontinence, saddle anesthesia, or poor rectal tone on exam.  - completed steroid taper   - Ortho consulted, recs appreciated.  No acute intervention, c/w steroid taper, and f/u outpatient with Dr. Huddleston  - Avoiding narcotics given ongoing constipation and no narcotic prescriptions will be given at discharge.

## 2018-10-31 NOTE — DIETITIAN INITIAL EVALUATION ADULT. - PROBLEM SELECTOR PLAN 1
Has known severe spinal stenosis on recent MRI from last admission 10/17. Reports worsening pain, b/l LE weakness (Rt>Lt), difficulty walking, urinary hesitancy and difficulty holding urine before reaching bathroom; Does not appear to have saddle anesthesia on exam.   - Last MRI with severe central canal stenosis at L3-4 and L4-5. L4-5-1 Right parasagittal extruded disc fragment. Circumferential disc bulging with a right parasagittal disc extrusion extending superiorly into the right L4 recess where results in mass effect upon the descending right L5 nerve root and compression of   the thecal sac.   - Consider repeat MRI  - Orthopedic surgery consult in AM  - Restart Dexamethasone IV (regimen to be further discussed with Ortho Sx by the primary team)  - Pain control with oxycodone ER 15 BID, and Dilaudid 1 IVP Q6 for breakthrough pain.   - Ibuprofen, lidocaine patches, gabapentin 300 TID, and methocarbamol  - Avoiding morphine due to concerns of bradycardia

## 2018-10-31 NOTE — DIETITIAN INITIAL EVALUATION ADULT. - PROBLEM SELECTOR PLAN 10
DVT ppx: SubQ lovenox  carbohydrate diet  Nicotine patch started  Tobacco cessation counseling provided at bedside      Summer Hernandez MD  Internal Medicine, PGY-2  Pager [phone]

## 2018-10-31 NOTE — DIETITIAN INITIAL EVALUATION ADULT. - PROBLEM SELECTOR PLAN 9
Likely atelectasis given pain lower back/leg pain (likely not moving)  - CXR - clear lungs - my reading  - f/u official CXR report

## 2018-11-01 VITALS
OXYGEN SATURATION: 99 % | SYSTOLIC BLOOD PRESSURE: 115 MMHG | TEMPERATURE: 99 F | DIASTOLIC BLOOD PRESSURE: 71 MMHG | HEART RATE: 57 BPM | RESPIRATION RATE: 18 BRPM

## 2018-11-01 PROCEDURE — 99239 HOSP IP/OBS DSCHRG MGMT >30: CPT

## 2018-11-01 RX ORDER — ACETAMINOPHEN 500 MG
3 TABLET ORAL
Qty: 0 | Refills: 0 | DISCHARGE
Start: 2018-11-01

## 2018-11-01 RX ORDER — IBUPROFEN 200 MG
1 TABLET ORAL
Qty: 0 | Refills: 0 | COMMUNITY

## 2018-11-01 RX ORDER — IBUPROFEN 200 MG
1 TABLET ORAL
Qty: 0 | Refills: 0 | DISCHARGE
Start: 2018-11-01

## 2018-11-01 RX ADMIN — Medication 975 MILLIGRAM(S): at 11:20

## 2018-11-01 RX ADMIN — Medication 100 MILLIGRAM(S): at 06:35

## 2018-11-01 RX ADMIN — POLYETHYLENE GLYCOL 3350 17 GRAM(S): 17 POWDER, FOR SOLUTION ORAL at 12:29

## 2018-11-01 RX ADMIN — GABAPENTIN 400 MILLIGRAM(S): 400 CAPSULE ORAL at 06:36

## 2018-11-01 RX ADMIN — Medication 400 MILLIGRAM(S): at 06:35

## 2018-11-01 RX ADMIN — Medication 400 MILLIGRAM(S): at 12:29

## 2018-11-01 RX ADMIN — Medication 1 PATCH: at 12:34

## 2018-11-01 RX ADMIN — Medication 400 MILLIGRAM(S): at 12:59

## 2018-11-01 RX ADMIN — LIDOCAINE 1 PATCH: 4 CREAM TOPICAL at 12:33

## 2018-11-01 RX ADMIN — ENOXAPARIN SODIUM 40 MILLIGRAM(S): 100 INJECTION SUBCUTANEOUS at 06:36

## 2018-11-01 RX ADMIN — Medication 975 MILLIGRAM(S): at 10:47

## 2018-11-01 RX ADMIN — Medication 1 PATCH: at 12:30

## 2018-11-01 RX ADMIN — TIZANIDINE 2 MILLIGRAM(S): 4 TABLET ORAL at 06:36

## 2018-11-01 RX ADMIN — TIZANIDINE 2 MILLIGRAM(S): 4 TABLET ORAL at 12:29

## 2018-11-01 RX ADMIN — AMLODIPINE BESYLATE 10 MILLIGRAM(S): 2.5 TABLET ORAL at 06:35

## 2018-11-01 RX ADMIN — Medication 400 MILLIGRAM(S): at 07:30

## 2018-11-01 NOTE — PROGRESS NOTE ADULT - PROBLEM SELECTOR PLAN 2
- Continue tizanidine, gabapentin, ibuprofen, acetominophen   - Pain regimen per Pain Mgmt consult, appreciate involvement.  - Avoiding narcotics given ongoing constipation and given etiology of pain 2/2 to spinal stenosis   - C/w bowel regimen.

## 2018-11-01 NOTE — PROGRESS NOTE ADULT - PROBLEM SELECTOR PROBLEM 1
Lumbar spinal stenosis

## 2018-11-01 NOTE — PROGRESS NOTE ADULT - PROBLEM SELECTOR PLAN 1
- 2/2 known severe spinal stenosis on recent MRI from last admission 10/17/18.  - Repeat MRI is not indicated given no gross bladder/bowel incontinence, saddle anesthesia, or poor rectal tone on exam.  - completed steroid taper   - Ortho consulted, recs appreciated.  No acute intervention, c/w steroid taper, and f/u outpatient with Dr. Huddleston  - Avoiding narcotics as per pain mngm reccs

## 2018-11-01 NOTE — PROGRESS NOTE ADULT - ASSESSMENT
62M h/o preDM, hearing impairment, chronic back pain and bilateral lower extremity pain in setting of recently diagnosed severe central canal stenosis at L3-L5 adm 10/24 for acute on chronic lower back pain c/b difficulty walking, falls, and bilateral LE weakness likely 2/2 known central canal stenosis. Discharged to rehab facility earlier this evening.

## 2018-11-01 NOTE — PROGRESS NOTE ADULT - PROBLEM SELECTOR PLAN 7
- Enoxaparin for dvt prophylaxis     Dispo: Rehab per PT    Andriy Montes MD - PGY1  Department of Internal Medicine  Pager - [phone]

## 2018-11-01 NOTE — PROGRESS NOTE ADULT - SUBJECTIVE AND OBJECTIVE BOX
Patient is a 62y old  Male who presents with a chief complaint of Back pain (31 Oct 2018 11:32)      SUBJECTIVE / OVERNIGHT EVENTS: Patient seen and examined at bedside this AM. Note submitted after discharge. Patient was c/o of lower back pain worse with movement. Adequately controlled with analgesic regimen. Otherwise no complaints.     MEDICATIONS  (STANDING):  amLODIPine   Tablet 10 milliGRAM(s) Oral daily  docusate sodium 100 milliGRAM(s) Oral three times a day  enoxaparin Injectable 40 milliGRAM(s) SubCutaneous every 24 hours  gabapentin 400 milliGRAM(s) Oral three times a day  lidocaine   Patch 1 Patch Transdermal daily  nicotine -   7 mG/24Hr(s) Patch 1 patch Transdermal daily  polyethylene glycol 3350 17 Gram(s) Oral daily  senna 2 Tablet(s) Oral at bedtime    MEDICATIONS  (PRN):  acetaminophen   Tablet .. 975 milliGRAM(s) Oral every 6 hours PRN Mild Pain (1 - 3), Moderate Pain (4 - 6)  ibuprofen  Tablet. 400 milliGRAM(s) Oral every 6 hours PRN Moderate Pain (4 - 6)  tiZANidine 2 milliGRAM(s) Oral every 6 hours PRN spasm      Vital Signs Last 24 Hrs  T(C): 37.1 (01 Nov 2018 14:43), Max: 37.1 (01 Nov 2018 14:43)  T(F): 98.8 (01 Nov 2018 14:43), Max: 98.8 (01 Nov 2018 14:43)  HR: 57 (01 Nov 2018 14:43) (54 - 57)  BP: 115/71 (01 Nov 2018 14:43) (115/71 - 121/80)  BP(mean): --  RR: 18 (01 Nov 2018 14:43) (18 - 18)  SpO2: 99% (01 Nov 2018 14:43) (99% - 100%)  CAPILLARY BLOOD GLUCOSE        I&O's Summary    31 Oct 2018 07:01  -  01 Nov 2018 07:00  --------------------------------------------------------  IN: 0 mL / OUT: 750 mL / NET: -750 mL        PHYSICAL EXAM:  Gen: awake, alert  HENT: neck supple  Eye: sclerae clear bilaterally  CV:Regular rate, & rhythm, no murmurs  Pulm: CTAB,   Abd: + soft, NT, ND  Skin: warm, dry  BACK: no spinal tenderness, no paraspinal muscle tenderness  MSK:  RLE:  passive ROM full, active ROM full, Was able to walk with assistance.   Neuro: answering questions appropriately, following commands appropriately, nonfocal  Psych: normal mood / affect    LABS:                    RADIOLOGY & ADDITIONAL TESTS:    Imaging Personally Reviewed:    Consultant(s) Notes Reviewed:      Care Discussed with Consultants/Other Providers:

## 2019-09-10 NOTE — ED PROVIDER NOTE - NS ED NOTE AC HIGH RISK COUNTRIES
[FreeTextEntry1] : \par \par B/L knee pain:\par -I reviewed results of MRI of B/L knees with pt\par -advise he stop ibuprofen as it is not working\par -will try meloxicam 15 mg PO daily prn\par -will refer to PT\par -will refer to orthopedics \par -he is to notify office if sx persist or worsen\par \par Hemorrhoids:\par -he has not made appt with Colorectal surgery_ again advised\par \par Vitamin D Deficiency:\par -I advised vitamin D3 1000 units daily\par \par Hypertriglyceridemia:\par -I adv low fat/low cholesterol diet\par -lipids at goal 2019\par \par HCM:\par \par CPE 2017\par \par EKG 2017\par \par Flu shot: 10/10/2018- advised in the fall \par \par Tdap:  2015\par \par HIV test:  2019 negative\par \par Hepatitis C screening: negative 2018\par \par PSA: 1.16 2019\par \par Depression screenin2019 Negative (PHQ 2 score 0)\par \par Colonoscopy: 2018-polyp -repeat colonoscopy advised in 5 years\par \par \par F/U 3 months.  
No

## 2022-05-30 NOTE — ED ADULT NURSE NOTE - CCCP TRG CHIEF CMPLNT
POD#5 Awake, alert. No complaints. Up in chair. Eager to ambulate. Denies CP, palpitations, SOB at rest, dizziness/lightheadedness. Vitals:    05/30/22 0411 05/30/22 0636 05/30/22 0709 05/30/22 1001   BP: (!) 118/57  (!) 118/58    Pulse: 70  72 76   Resp: 16  16 16   Temp: 97.7 °F (36.5 °C)  97.7 °F (36.5 °C)    TempSrc: Temporal  Temporal    SpO2: (!) 84%  92% 96%   Weight:  185 lb 9.6 oz (84.2 kg)     Height:         O2: none      Intake/Output Summary (Last 24 hours) at 5/30/2022 1004  Last data filed at 5/30/2022 0859  Gross per 24 hour   Intake 540 ml   Output 2450 ml   Net -1910 ml         +BM on 5/29    UO: 1200mL/8hr       Recent Labs     05/28/22  0659 05/29/22  0733 05/30/22  0542   WBC 16.1* 15.4* 10.8   HGB 10.6* 9.7* 8.9*   HCT 33.4* 29.9* 27.5*   PLT 87* 133 132      Recent Labs     05/28/22  0659 05/29/22  0733 05/30/22  0542   BUN 41* 43* 37*   CREATININE 0.9 1.0 0.9         Telemetry: SR        PE  Cardiac: RRR  Lungs: decreased bases  Chest incision with intact GHANSHYAM DSD.  Sternum stable. Prior chest tube site incisions C/D/I, no erythema with intact sutures. Epicardial pacing wires present and secure.    Abd: Softly distended-improved, nontender, +BS  Ext: Incisions C/D/I, approximated, no erythema, + edema.              A/P: POD#5  1. VHD/CAD  --Stable s/p Urgent sternotomy/Urgent Aortic valve repair by leaflet debridement/Complex mitral valve replacement with annular debridement and a 33mm [employer] Mosaic bioprosthetic/Tricuspid valve repair by bicuspidization/CABG x 3 (LIMA-LAD, SVG-Diag, SVG-PDA)/WALLACE exclusion with 40mm AtriClip/EVH RLE/Rigid internal fixation of the sternum with [place] plates x 7/01 modifier on 5/25/2022  --Post op JOEL reveals 60% EF on inotropic support  --Will order TTE prior to discharge to establish baseline and evaluate valves, ordered on 5/28/2022  --Scr stable  --ASA/statin - low dose BB started 5/28  --reinforce sternal precautions  --continue epicardial pacing back pain general aspiration   --witnessed coughing with thin liquids by nursing staff in [facility]   --Speech consulted for evaluation -- recommend minced and moist consistency solids with thin liquids            DVT prophylaxis:  --continue bilateral knee high MYKE hose  --continue PCDs  --continue progressive ambulation  --continue lovenox for dvt prophylaxis and continue knee high MYKE hose/pcds/progressive ambulation        Dispo: plan is for rehab on discharge, possibly as soon as Tuesday--case management unable to initiate plans for rehab until 5/31 due to the holiday        This patient's case and care plan was discussed with the attending surgeon

## 2022-10-07 NOTE — PROGRESS NOTE ADULT - PROBLEM SELECTOR PROBLEM 4
Patient is pain for carisoprodol on his own and does not want alternative   Leukocytosis Foot pain, right

## 2022-12-02 ENCOUNTER — INPATIENT (INPATIENT)
Facility: HOSPITAL | Age: 67
LOS: 16 days | Discharge: HOME | End: 2022-12-19
Attending: INTERNAL MEDICINE | Admitting: INTERNAL MEDICINE
Payer: MEDICARE

## 2022-12-02 VITALS
DIASTOLIC BLOOD PRESSURE: 74 MMHG | HEART RATE: 53 BPM | SYSTOLIC BLOOD PRESSURE: 154 MMHG | OXYGEN SATURATION: 97 % | RESPIRATION RATE: 17 BRPM | HEIGHT: 75 IN | TEMPERATURE: 98 F | WEIGHT: 214.95 LBS

## 2022-12-02 PROBLEM — H91.90 UNSPECIFIED HEARING LOSS, UNSPECIFIED EAR: Chronic | Status: ACTIVE | Noted: 2018-10-24

## 2022-12-02 PROBLEM — M48.061 SPINAL STENOSIS, LUMBAR REGION WITHOUT NEUROGENIC CLAUDICATION: Chronic | Status: ACTIVE | Noted: 2018-10-24

## 2022-12-02 PROBLEM — R73.03 PREDIABETES: Chronic | Status: ACTIVE | Noted: 2018-10-24

## 2022-12-02 LAB
ALBUMIN SERPL ELPH-MCNC: 4 G/DL — SIGNIFICANT CHANGE UP (ref 3.3–5)
ALP SERPL-CCNC: 70 U/L — SIGNIFICANT CHANGE UP (ref 40–120)
ALT FLD-CCNC: 26 U/L — SIGNIFICANT CHANGE UP (ref 12–78)
ANION GAP SERPL CALC-SCNC: 5 MMOL/L — SIGNIFICANT CHANGE UP (ref 5–17)
APTT BLD: 33.5 SEC — SIGNIFICANT CHANGE UP (ref 27.5–35.5)
AST SERPL-CCNC: 15 U/L — SIGNIFICANT CHANGE UP (ref 15–37)
BASOPHILS # BLD AUTO: 0.05 K/UL — SIGNIFICANT CHANGE UP (ref 0–0.2)
BASOPHILS NFR BLD AUTO: 0.6 % — SIGNIFICANT CHANGE UP (ref 0–2)
BILIRUB SERPL-MCNC: 0.6 MG/DL — SIGNIFICANT CHANGE UP (ref 0.2–1.2)
BUN SERPL-MCNC: 20 MG/DL — SIGNIFICANT CHANGE UP (ref 7–23)
CALCIUM SERPL-MCNC: 9.4 MG/DL — SIGNIFICANT CHANGE UP (ref 8.5–10.1)
CHLORIDE SERPL-SCNC: 104 MMOL/L — SIGNIFICANT CHANGE UP (ref 96–108)
CO2 SERPL-SCNC: 30 MMOL/L — SIGNIFICANT CHANGE UP (ref 22–31)
CREAT SERPL-MCNC: 0.9 MG/DL — SIGNIFICANT CHANGE UP (ref 0.5–1.3)
EGFR: 95 ML/MIN/1.73M2 — SIGNIFICANT CHANGE UP
EOSINOPHIL # BLD AUTO: 0.26 K/UL — SIGNIFICANT CHANGE UP (ref 0–0.5)
EOSINOPHIL NFR BLD AUTO: 3 % — SIGNIFICANT CHANGE UP (ref 0–6)
FLUAV AG NPH QL: SIGNIFICANT CHANGE UP
FLUBV AG NPH QL: SIGNIFICANT CHANGE UP
GLUCOSE SERPL-MCNC: 110 MG/DL — HIGH (ref 70–99)
HCT VFR BLD CALC: 42.4 % — SIGNIFICANT CHANGE UP (ref 39–50)
HGB BLD-MCNC: 14.8 G/DL — SIGNIFICANT CHANGE UP (ref 13–17)
IMM GRANULOCYTES NFR BLD AUTO: 0.2 % — SIGNIFICANT CHANGE UP (ref 0–0.9)
INR BLD: 1.09 RATIO — SIGNIFICANT CHANGE UP (ref 0.88–1.16)
LYMPHOCYTES # BLD AUTO: 2.61 K/UL — SIGNIFICANT CHANGE UP (ref 1–3.3)
LYMPHOCYTES # BLD AUTO: 29.6 % — SIGNIFICANT CHANGE UP (ref 13–44)
MAGNESIUM SERPL-MCNC: 2.3 MG/DL — SIGNIFICANT CHANGE UP (ref 1.6–2.6)
MCHC RBC-ENTMCNC: 31.5 PG — SIGNIFICANT CHANGE UP (ref 27–34)
MCHC RBC-ENTMCNC: 34.9 G/DL — SIGNIFICANT CHANGE UP (ref 32–36)
MCV RBC AUTO: 90.2 FL — SIGNIFICANT CHANGE UP (ref 80–100)
MONOCYTES # BLD AUTO: 0.82 K/UL — SIGNIFICANT CHANGE UP (ref 0–0.9)
MONOCYTES NFR BLD AUTO: 9.3 % — SIGNIFICANT CHANGE UP (ref 2–14)
NEUTROPHILS # BLD AUTO: 5.05 K/UL — SIGNIFICANT CHANGE UP (ref 1.8–7.4)
NEUTROPHILS NFR BLD AUTO: 57.3 % — SIGNIFICANT CHANGE UP (ref 43–77)
NRBC # BLD: 0 /100 WBCS — SIGNIFICANT CHANGE UP (ref 0–0)
PHOSPHATE SERPL-MCNC: 4.3 MG/DL — SIGNIFICANT CHANGE UP (ref 2.5–4.5)
PLATELET # BLD AUTO: 240 K/UL — SIGNIFICANT CHANGE UP (ref 150–400)
POTASSIUM SERPL-MCNC: 3.4 MMOL/L — LOW (ref 3.5–5.3)
POTASSIUM SERPL-SCNC: 3.4 MMOL/L — LOW (ref 3.5–5.3)
PROT SERPL-MCNC: 7.8 GM/DL — SIGNIFICANT CHANGE UP (ref 6–8.3)
PROTHROM AB SERPL-ACNC: 13 SEC — SIGNIFICANT CHANGE UP (ref 10.5–13.4)
RBC # BLD: 4.7 M/UL — SIGNIFICANT CHANGE UP (ref 4.2–5.8)
RBC # FLD: 11.7 % — SIGNIFICANT CHANGE UP (ref 10.3–14.5)
SARS-COV-2 RNA SPEC QL NAA+PROBE: SIGNIFICANT CHANGE UP
SODIUM SERPL-SCNC: 139 MMOL/L — SIGNIFICANT CHANGE UP (ref 135–145)
WBC # BLD: 8.81 K/UL — SIGNIFICANT CHANGE UP (ref 3.8–10.5)
WBC # FLD AUTO: 8.81 K/UL — SIGNIFICANT CHANGE UP (ref 3.8–10.5)

## 2022-12-02 PROCEDURE — 99285 EMERGENCY DEPT VISIT HI MDM: CPT | Mod: FS

## 2022-12-02 PROCEDURE — 93971 EXTREMITY STUDY: CPT | Mod: 26

## 2022-12-02 PROCEDURE — 93010 ELECTROCARDIOGRAM REPORT: CPT

## 2022-12-02 PROCEDURE — 73706 CT ANGIO LWR EXTR W/O&W/DYE: CPT | Mod: 26,50,MA

## 2022-12-02 PROCEDURE — 93925 LOWER EXTREMITY STUDY: CPT | Mod: 26

## 2022-12-02 RX ORDER — MORPHINE SULFATE 50 MG/1
4 CAPSULE, EXTENDED RELEASE ORAL ONCE
Refills: 0 | Status: DISCONTINUED | OUTPATIENT
Start: 2022-12-02 | End: 2022-12-02

## 2022-12-02 RX ORDER — ACETAMINOPHEN 500 MG
1000 TABLET ORAL ONCE
Refills: 0 | Status: COMPLETED | OUTPATIENT
Start: 2022-12-02 | End: 2022-12-02

## 2022-12-02 RX ORDER — HEPARIN SODIUM 5000 [USP'U]/ML
4000 INJECTION INTRAVENOUS; SUBCUTANEOUS EVERY 6 HOURS
Refills: 0 | Status: DISCONTINUED | OUTPATIENT
Start: 2022-12-02 | End: 2022-12-03

## 2022-12-02 RX ORDER — HEPARIN SODIUM 5000 [USP'U]/ML
8000 INJECTION INTRAVENOUS; SUBCUTANEOUS EVERY 6 HOURS
Refills: 0 | Status: DISCONTINUED | OUTPATIENT
Start: 2022-12-02 | End: 2022-12-03

## 2022-12-02 RX ORDER — HEPARIN SODIUM 5000 [USP'U]/ML
8000 INJECTION INTRAVENOUS; SUBCUTANEOUS ONCE
Refills: 0 | Status: COMPLETED | OUTPATIENT
Start: 2022-12-02 | End: 2022-12-02

## 2022-12-02 RX ORDER — HEPARIN SODIUM 5000 [USP'U]/ML
INJECTION INTRAVENOUS; SUBCUTANEOUS
Qty: 25000 | Refills: 0 | Status: DISCONTINUED | OUTPATIENT
Start: 2022-12-02 | End: 2022-12-03

## 2022-12-02 RX ADMIN — HEPARIN SODIUM 1800 UNIT(S)/HR: 5000 INJECTION INTRAVENOUS; SUBCUTANEOUS at 19:48

## 2022-12-02 RX ADMIN — Medication 400 MILLIGRAM(S): at 16:40

## 2022-12-02 RX ADMIN — HEPARIN SODIUM 8000 UNIT(S): 5000 INJECTION INTRAVENOUS; SUBCUTANEOUS at 19:48

## 2022-12-02 NOTE — ED PROVIDER NOTE - CLINICAL SUMMARY MEDICAL DECISION MAKING FREE TEXT BOX
67y Male with PMHx HTN, HLD, neuropathy, arthritis, chronic back pain presents to the ER for lower extremity pain. Reports waking up with bilateral lower back pain with radiation down both legs, L leg worse than R leg. Reports 10/10 pain, +crampy, numbness, tingling. Motrin at 12pm with minimal relief. Denies trauma, injury. +smoker. Denies recent surgeries, long car rides or plane rides, recent hospitalizations. Denies bowel or bladder incontinence or retention, saddle anesthesia. Denies chest pain, palpitations, SOB. Vital signs stable, exam unremarkable, concern for MSK pain vs chronic arthritis, low suspicion for DVT or electrolyte abnormality. Will get labs, meds, US, reassess. Patient declining CT at this time.

## 2022-12-02 NOTE — ED PROVIDER NOTE - OBJECTIVE STATEMENT
67y Male with PMHx HTN, HLD, neuropathy, arthritis presents to the ER for lower extremity pain. Patient reports waking up with bilateral lower back pain with radiation down both legs, L leg worse than R leg. Reports 10/10 pain, +crampy, numbness, tingling. Tylenol this morning with minimal relief. Denies trauma, injury. +smoker. Denies recent surgeries, long car rides or plane rides, recent hospitalizations. Denies bowel or bladder incontinence or retention, saddle anesthesia. 67y Male with PMHx HTN, HLD, neuropathy, arthritis, chronic back pain presents to the ER for lower extremity pain. Patient reports waking up with bilateral lower back pain with radiation down both legs, L leg worse than R leg. Reports 10/10 pain, +crampy, numbness, tingling. Motrin at 12pm with minimal relief. Denies trauma, injury. +smoker. Denies recent surgeries, long car rides or plane rides, recent hospitalizations. Denies bowel or bladder incontinence or retention, saddle anesthesia. Of note, patient seen at [facility] for similar symptoms prior to Thanksgiving, was supposed to get stress test but was given the option to go home vs stay for it. Denies chest pain, palpitations, SOB.

## 2022-12-02 NOTE — ED PROVIDER NOTE - NSFOLLOWUPINSTRUCTIONS_ED_ALL_ED_FT
Rest, drink plenty of fluids  Advance activity as tolerated  Continue all previously prescribed medications as directed  Follow up with your PMD - bring copies of your results  Return to the ER for chest pain, difficulty breathing, severe pain, numbness, weakness, or other new or concerning symptoms

## 2022-12-02 NOTE — ED PROVIDER NOTE - NSICDXPASTMEDICALHX_GEN_ALL_CORE_FT
PAST MEDICAL HISTORY:  Hearing difficulty, unspecified laterality     Lumbar stenosis     Prediabetes

## 2022-12-02 NOTE — ED PROVIDER NOTE - NS ED ATTENDING STATEMENT MOD
This was a shared visit with the JAYASHREE. I reviewed and verified the documentation and independently performed the documented:

## 2022-12-02 NOTE — ED PROVIDER NOTE - PROGRESS NOTE DETAILS
Rene: Reviewed results with patient.  Received sign out pending arterial sono, CTA.  Initially ordered for venous duplex but tech noted abnormal arterial findings so added LE arterial study.  D/w Dr. Covarrubias on call vascular recommended CTA.  CTA with extensive atherosclerotic disease but no acute occlusion at this time.  D/w Dr. Covarrubias who recommends follow up in office.  Patient also with history of chronic lower back pain for years radiating down b/l LE which he states is same pain today.  Ambulating.  Denies claudication pain, right now LEs warm, appear well perfused clinically.  Patient declined morphine and improved pain with tylenol. Rene: Reviewed results with patient.  Received sign out pending arterial sono, CTA.  Initially ordered for venous duplex but tech noted abnormal arterial findings so added LE arterial study.  D/w Dr. Covarrubias on call vascular recommended CTA.  CTA with extensive atherosclerotic disease but no acute occlusion at this time.  D/w Dr. Covarrubias who recommends follow up in office.  Patient also with history of chronic lower back pain for years radiating down b/l LE which he states is same pain today.  No new symptoms and neuro intact currently.  Ambulating.  Denies claudication pain, pain is chronic, right now LEs warm, appear well perfused clinically.  Moving without restriction.  Patient declined morphine and improved pain with tylenol.  Heparin gtt dc physically by me 4am. Rene: Patient endorsing pain in buttock region, constant.  C/w patients previous chronic lbp/ radiculopathy.  Says worse from lying in stretcher.  No pain in distal LE.  Given additional meds. Rene: Patient ambulating with cane but still with pain in buttock region.  Seems unsteady.  With recent heparin gtt, concerned patient may fall.  Discussed plan for admission for observation and pain control and patient agrees. Rene: Reviewed results with patient.  Received sign out pending arterial sono, CTA.  Initially ordered for venous duplex but tech noted abnormal arterial findings so added LE arterial study.  D/w Dr. Covarrubias on call vascular recommended CTA.  CTA with extensive atherosclerotic disease but no acute occlusion at this time.  D/w Dr. Covarrubias who recommends follow up in office.  Patient also with history of chronic lower back pain for years radiating down b/l LE which he states is same pain today.  No new symptoms and neuro intact currently.  Ambulating at home with walker.  Denies claudication type pain, pain is chronic and right now LEs warm, appear well perfused clinically.  Moving LEs without restriction.  Patient declined morphine and improved pain with tylenol.  Heparin gtt dc physically by me 4am.

## 2022-12-02 NOTE — ED PROVIDER NOTE - NS_EDPROVIDERDISPOUSERTYPE_ED_A_ED
Patient would like communication of their results via:        Cell Phone:   Telephone Information:   Mobile [phone]     Okay to leave a message containing results? Yes    Health Maintenance Due   Topic Date Due   • COVID-19 Vaccine (3 - Booster for Moderna series) 04/16/2021   • Pneumococcal Vaccine 0-64 (2 - PCV) 06/13/2021   • Medicare Advantage- Medicare Wellness Visit  01/01/2022   • Influenza Vaccine (1) 09/01/2022   • Cervical Cancer Risk  09/28/2022   • Depression Screening  11/03/2022       Patient is up to date, no discussion needed.     Attending Attestation (For Attendings USE Only)...

## 2022-12-02 NOTE — ED ADULT NURSE NOTE - ED STAT RN HANDOFF DETAILS
Hand off report given to Juju ELIAS. Made aware of current situation. Current labs and hepatin drip that is currently paused per Dr. Steele. Pt on cardiac monitor and pulse ox in place, vitals are stable. Hand off report given to Juju ELIAS. Made aware of current situation. Current labs and hepatin drip that is currently paused per Dr. López. No adverse side effects of heparin noted. Pt on cardiac monitor and pulse ox in place.

## 2022-12-02 NOTE — ED PROVIDER NOTE - NS ED ROS FT
Constitutional: (-) Fever, (-) Chills  Skin: (-) Color changes, (-) Rashes, (-) Wounds  CV: (-) Chest pain, (-) Palpitations  Resp: (-) Cough, (-) Shortness of breath  GI: (-) Abdominal pain, (-) Nausea, (-) Vomiting, (-) Diarrhea  : (-) Dysuria, (-) Hematuria, (-) Increased frequency, (-) Incontinence, (-) Retention, (-) Saddle anesthesia   MSK: (+) Myalgias, (+) Back pain, (-) Calf pain, (-) Neck pain  Neuro: (-) Headache

## 2022-12-02 NOTE — ED ADULT NURSE REASSESSMENT NOTE - NS ED NURSE REASSESS COMMENT FT1
AAOx3 pt in stretcher, on Heparin drip going 18mL/hr. Pt on cardiac monitor with a HR that has been trending into the mid 40's, pt asymptomatic.  Pulse ox in place, satting 96% on RA. Pt in NAD at this time

## 2022-12-03 DIAGNOSIS — Z29.9 ENCOUNTER FOR PROPHYLACTIC MEASURES, UNSPECIFIED: ICD-10-CM

## 2022-12-03 DIAGNOSIS — M48.061 SPINAL STENOSIS, LUMBAR REGION WITHOUT NEUROGENIC CLAUDICATION: ICD-10-CM

## 2022-12-03 DIAGNOSIS — I10 ESSENTIAL (PRIMARY) HYPERTENSION: ICD-10-CM

## 2022-12-03 DIAGNOSIS — I73.9 PERIPHERAL VASCULAR DISEASE, UNSPECIFIED: ICD-10-CM

## 2022-12-03 DIAGNOSIS — M54.50 LOW BACK PAIN, UNSPECIFIED: ICD-10-CM

## 2022-12-03 LAB
A1C WITH ESTIMATED AVERAGE GLUCOSE RESULT: 6.9 % — HIGH (ref 4–5.6)
ALBUMIN SERPL ELPH-MCNC: 3.7 G/DL — SIGNIFICANT CHANGE UP (ref 3.3–5)
ALP SERPL-CCNC: 70 U/L — SIGNIFICANT CHANGE UP (ref 40–120)
ALT FLD-CCNC: 26 U/L — SIGNIFICANT CHANGE UP (ref 12–78)
ANION GAP SERPL CALC-SCNC: 7 MMOL/L — SIGNIFICANT CHANGE UP (ref 5–17)
APTT BLD: 46.7 SEC — HIGH (ref 27.5–35.5)
AST SERPL-CCNC: 17 U/L — SIGNIFICANT CHANGE UP (ref 15–37)
BILIRUB SERPL-MCNC: 0.5 MG/DL — SIGNIFICANT CHANGE UP (ref 0.2–1.2)
BUN SERPL-MCNC: 21 MG/DL — SIGNIFICANT CHANGE UP (ref 7–23)
CALCIUM SERPL-MCNC: 9.1 MG/DL — SIGNIFICANT CHANGE UP (ref 8.5–10.1)
CHLORIDE SERPL-SCNC: 102 MMOL/L — SIGNIFICANT CHANGE UP (ref 96–108)
CHOLEST SERPL-MCNC: 169 MG/DL — SIGNIFICANT CHANGE UP
CO2 SERPL-SCNC: 29 MMOL/L — SIGNIFICANT CHANGE UP (ref 22–31)
CREAT SERPL-MCNC: 0.76 MG/DL — SIGNIFICANT CHANGE UP (ref 0.5–1.3)
EGFR: 100 ML/MIN/1.73M2 — SIGNIFICANT CHANGE UP
ESTIMATED AVERAGE GLUCOSE: 151 MG/DL — HIGH (ref 68–114)
GLUCOSE SERPL-MCNC: 107 MG/DL — HIGH (ref 70–99)
HCT VFR BLD CALC: 40.9 % — SIGNIFICANT CHANGE UP (ref 39–50)
HCT VFR BLD CALC: 42.8 % — SIGNIFICANT CHANGE UP (ref 39–50)
HDLC SERPL-MCNC: 38 MG/DL — LOW
HGB BLD-MCNC: 14.4 G/DL — SIGNIFICANT CHANGE UP (ref 13–17)
HGB BLD-MCNC: 14.9 G/DL — SIGNIFICANT CHANGE UP (ref 13–17)
LIPID PNL WITH DIRECT LDL SERPL: 111 MG/DL — HIGH
MCHC RBC-ENTMCNC: 31.1 PG — SIGNIFICANT CHANGE UP (ref 27–34)
MCHC RBC-ENTMCNC: 31.4 PG — SIGNIFICANT CHANGE UP (ref 27–34)
MCHC RBC-ENTMCNC: 34.8 G/DL — SIGNIFICANT CHANGE UP (ref 32–36)
MCHC RBC-ENTMCNC: 35.2 G/DL — SIGNIFICANT CHANGE UP (ref 32–36)
MCV RBC AUTO: 88.3 FL — SIGNIFICANT CHANGE UP (ref 80–100)
MCV RBC AUTO: 90.3 FL — SIGNIFICANT CHANGE UP (ref 80–100)
NON HDL CHOLESTEROL: 131 MG/DL — HIGH
NRBC # BLD: 0 /100 WBCS — SIGNIFICANT CHANGE UP (ref 0–0)
NRBC # BLD: 0 /100 WBCS — SIGNIFICANT CHANGE UP (ref 0–0)
PLATELET # BLD AUTO: 227 K/UL — SIGNIFICANT CHANGE UP (ref 150–400)
PLATELET # BLD AUTO: 230 K/UL — SIGNIFICANT CHANGE UP (ref 150–400)
POTASSIUM SERPL-MCNC: 3.5 MMOL/L — SIGNIFICANT CHANGE UP (ref 3.5–5.3)
POTASSIUM SERPL-SCNC: 3.5 MMOL/L — SIGNIFICANT CHANGE UP (ref 3.5–5.3)
PROT SERPL-MCNC: 7.6 GM/DL — SIGNIFICANT CHANGE UP (ref 6–8.3)
RBC # BLD: 4.63 M/UL — SIGNIFICANT CHANGE UP (ref 4.2–5.8)
RBC # BLD: 4.74 M/UL — SIGNIFICANT CHANGE UP (ref 4.2–5.8)
RBC # FLD: 11.7 % — SIGNIFICANT CHANGE UP (ref 10.3–14.5)
RBC # FLD: 11.9 % — SIGNIFICANT CHANGE UP (ref 10.3–14.5)
SODIUM SERPL-SCNC: 138 MMOL/L — SIGNIFICANT CHANGE UP (ref 135–145)
TRIGL SERPL-MCNC: 99 MG/DL — SIGNIFICANT CHANGE UP
WBC # BLD: 8.4 K/UL — SIGNIFICANT CHANGE UP (ref 3.8–10.5)
WBC # BLD: 8.64 K/UL — SIGNIFICANT CHANGE UP (ref 3.8–10.5)
WBC # FLD AUTO: 8.4 K/UL — SIGNIFICANT CHANGE UP (ref 3.8–10.5)
WBC # FLD AUTO: 8.64 K/UL — SIGNIFICANT CHANGE UP (ref 3.8–10.5)

## 2022-12-03 PROCEDURE — 76775 US EXAM ABDO BACK WALL LIM: CPT | Mod: 26

## 2022-12-03 PROCEDURE — 99223 1ST HOSP IP/OBS HIGH 75: CPT

## 2022-12-03 RX ORDER — LIDOCAINE 4 G/100G
1 CREAM TOPICAL EVERY 24 HOURS
Refills: 0 | Status: DISCONTINUED | OUTPATIENT
Start: 2022-12-04 | End: 2022-12-19

## 2022-12-03 RX ORDER — METHOCARBAMOL 500 MG/1
750 TABLET, FILM COATED ORAL ONCE
Refills: 0 | Status: COMPLETED | OUTPATIENT
Start: 2022-12-03 | End: 2022-12-03

## 2022-12-03 RX ORDER — GABAPENTIN 400 MG/1
400 CAPSULE ORAL THREE TIMES A DAY
Refills: 0 | Status: DISCONTINUED | OUTPATIENT
Start: 2022-12-03 | End: 2022-12-11

## 2022-12-03 RX ORDER — OXYCODONE HYDROCHLORIDE 5 MG/1
5 TABLET ORAL ONCE
Refills: 0 | Status: DISCONTINUED | OUTPATIENT
Start: 2022-12-03 | End: 2022-12-03

## 2022-12-03 RX ORDER — AMLODIPINE BESYLATE 2.5 MG/1
10 TABLET ORAL DAILY
Refills: 0 | Status: DISCONTINUED | OUTPATIENT
Start: 2022-12-03 | End: 2022-12-19

## 2022-12-03 RX ORDER — MORPHINE SULFATE 50 MG/1
4 CAPSULE, EXTENDED RELEASE ORAL ONCE
Refills: 0 | Status: DISCONTINUED | OUTPATIENT
Start: 2022-12-03 | End: 2022-12-03

## 2022-12-03 RX ORDER — LIDOCAINE 4 G/100G
1 CREAM TOPICAL ONCE
Refills: 0 | Status: COMPLETED | OUTPATIENT
Start: 2022-12-03 | End: 2022-12-03

## 2022-12-03 RX ORDER — CYCLOBENZAPRINE HYDROCHLORIDE 10 MG/1
10 TABLET, FILM COATED ORAL ONCE
Refills: 0 | Status: COMPLETED | OUTPATIENT
Start: 2022-12-03 | End: 2022-12-03

## 2022-12-03 RX ORDER — ACETAMINOPHEN 500 MG
975 TABLET ORAL EVERY 6 HOURS
Refills: 0 | Status: DISCONTINUED | OUTPATIENT
Start: 2022-12-03 | End: 2022-12-10

## 2022-12-03 RX ORDER — INFLUENZA VIRUS VACCINE 15; 15; 15; 15 UG/.5ML; UG/.5ML; UG/.5ML; UG/.5ML
0.7 SUSPENSION INTRAMUSCULAR ONCE
Refills: 0 | Status: DISCONTINUED | OUTPATIENT
Start: 2022-12-03 | End: 2022-12-19

## 2022-12-03 RX ORDER — ACETAMINOPHEN 500 MG
1000 TABLET ORAL ONCE
Refills: 0 | Status: COMPLETED | OUTPATIENT
Start: 2022-12-03 | End: 2022-12-03

## 2022-12-03 RX ORDER — CYCLOBENZAPRINE HYDROCHLORIDE 10 MG/1
10 TABLET, FILM COATED ORAL THREE TIMES A DAY
Refills: 0 | Status: COMPLETED | OUTPATIENT
Start: 2022-12-03 | End: 2022-12-05

## 2022-12-03 RX ADMIN — LIDOCAINE 1 PATCH: 4 CREAM TOPICAL at 08:10

## 2022-12-03 RX ADMIN — Medication 975 MILLIGRAM(S): at 22:40

## 2022-12-03 RX ADMIN — MORPHINE SULFATE 4 MILLIGRAM(S): 50 CAPSULE, EXTENDED RELEASE ORAL at 08:33

## 2022-12-03 RX ADMIN — Medication 975 MILLIGRAM(S): at 14:59

## 2022-12-03 RX ADMIN — CYCLOBENZAPRINE HYDROCHLORIDE 10 MILLIGRAM(S): 10 TABLET, FILM COATED ORAL at 07:57

## 2022-12-03 RX ADMIN — CYCLOBENZAPRINE HYDROCHLORIDE 10 MILLIGRAM(S): 10 TABLET, FILM COATED ORAL at 16:00

## 2022-12-03 RX ADMIN — GABAPENTIN 400 MILLIGRAM(S): 400 CAPSULE ORAL at 16:00

## 2022-12-03 RX ADMIN — OXYCODONE HYDROCHLORIDE 5 MILLIGRAM(S): 5 TABLET ORAL at 06:28

## 2022-12-03 RX ADMIN — Medication 975 MILLIGRAM(S): at 21:40

## 2022-12-03 RX ADMIN — GABAPENTIN 400 MILLIGRAM(S): 400 CAPSULE ORAL at 21:39

## 2022-12-03 RX ADMIN — AMLODIPINE BESYLATE 10 MILLIGRAM(S): 2.5 TABLET ORAL at 18:02

## 2022-12-03 RX ADMIN — LIDOCAINE 1 PATCH: 4 CREAM TOPICAL at 20:30

## 2022-12-03 RX ADMIN — CYCLOBENZAPRINE HYDROCHLORIDE 10 MILLIGRAM(S): 10 TABLET, FILM COATED ORAL at 21:39

## 2022-12-03 RX ADMIN — Medication 975 MILLIGRAM(S): at 16:02

## 2022-12-03 RX ADMIN — LIDOCAINE 1 PATCH: 4 CREAM TOPICAL at 18:05

## 2022-12-03 RX ADMIN — Medication 400 MILLIGRAM(S): at 01:46

## 2022-12-03 RX ADMIN — METHOCARBAMOL 750 MILLIGRAM(S): 500 TABLET, FILM COATED ORAL at 05:17

## 2022-12-03 NOTE — H&P ADULT - NSHPSOCIALHISTORY_GEN_ALL_CORE
Living situation: lives at home, uses walker and sometimes uses cane to walk, able to perform ADLs  Occupation: retired, used to be a   Tobacco Use: 1.5 packs per day for 64 years.   Alcohol Use: denies etoh intake  Drug use: denies marijuana, cocaine, heroine and other illicit drug use

## 2022-12-03 NOTE — H&P ADULT - NSHPPHYSICALEXAM_GEN_ALL_CORE
Vital Signs Last 24 Hrs  T(C): 36.8 (03 Dec 2022 07:36), Max: 36.8 (03 Dec 2022 07:36)  T(F): 98.2 (03 Dec 2022 07:36), Max: 98.2 (03 Dec 2022 07:36)  HR: 55 (03 Dec 2022 07:36) (41 - 55)  BP: 119/77 (03 Dec 2022 07:36) (111/76 - 154/74)  BP(mean): --  RR: 13 (03 Dec 2022 07:36) (12 - 20)  SpO2: 95% (03 Dec 2022 07:36) (95% - 98%)    Parameters below as of 03 Dec 2022 07:36  Patient On (Oxygen Delivery Method): room air

## 2022-12-03 NOTE — H&P ADULT - HISTORY OF PRESENT ILLNESS
all other ROS negative except as per HPI
Patient is a 67yom w/ hx of HTN, HLD, neuropathy, lumbar stenosis, who presents with acute on chronic back and lower extremity pain. Patient states that on 11/21, he had lower back pain that radiated to his b/l lower legs, and was admited to [facility]. They performed some studies (he's unsure exactly what" and discharged him. He presents to [facility] with worsening of his lower back pain, often 10/10 in intensity, and worse when leaning forward, bending down ( ie. to get something from the floor), improves with motrin and gabapentin. Lower back pain radiates down his bilateral legs and has associated numbness and tingling. Pain is "squeezing" and "throbbing" in nature. The pain recently became unbearable and brought him here. He denies any urinary or fecal incontinence, and denies any saddle anesthesia. Denies any fevers or chils, rashes, n/v/headaches. States that at [facility], he was told he had a "abdominal aneurysm has hx of smoking, currently smoked 1.5 packs per day.

## 2022-12-03 NOTE — PATIENT PROFILE ADULT - NSPROGENOTHERPROVIDER_GEN_A_NUR
Patient will come to office to : prescription.   Patient was advised of location and hours: Yes.   Patient was advised to bring photo identification: Yes.   Patient elects another party to  item: yes, name: Teodoro, Daughter.   none

## 2022-12-03 NOTE — PATIENT PROFILE ADULT - FALL HARM RISK - HARM RISK INTERVENTIONS

## 2022-12-03 NOTE — H&P ADULT - PROBLEM SELECTOR PLAN 2
MR reviewed from 10/2018: "Moderate central canal stenosis at 23. Severe central canal stenosis at L3-4 and L4-5. L4-5-1 right parasagittal extruded disc fragment extends superiorly "  -likely worsening of his known stenosis  -repeat MRI as above

## 2022-12-03 NOTE — H&P ADULT - ASSESSMENT
67yom w/ hx of HTN, HLD, neuropathy, lumbar stenosis, who presents with acute on chronic back and lower extremity pain, admitted for pain control. Also found to have occlusion of femoral artery, from peripheral vascular disease.

## 2022-12-03 NOTE — H&P ADULT - NSHPREVIEWOFSYSTEMS_GEN_ALL_CORE
REVIEW OF SYSTEMS:  CONSTITUTIONAL/GENERAL: No weakness, fevers or chills  EYES/OPHTHALMOLOGIC: No visual changes, No blurry vision, No vertigo   ENMT: No throat pain, or neck stiffness   RESPIRATORY/THORAX: No cough, wheezing, hemoptysis; No shortness of breath  CARDIOVASCULAR: No chest pain or palpitations  GASTROINTESTINAL: No abdominal or epigastric pain. No nausea, vomiting, or hematemesis; No diarrhea or constipation. No melena or hematochezia.  GENITOURINARY: No dysuria, frequency or hematuria  NEUROLOGICAL: No numbness or weakness  SKIN: No itching, rashes  ENDOCRINOLOGY: no cold intolerance, no heat intolerance, no weight gain nor weigh tloss  PSYCHIATRIC:  mood stable, no si/hi  MUSCULOSKELETAL SYSTEM: lower back pain sharp/throbbing in nature, lower extremity pain and associated numbness/tingling.

## 2022-12-03 NOTE — H&P ADULT - PROBLEM SELECTOR PLAN 3
CT on this admission shows "Occlusion of the superficial femoral artery and proximal popliteal artery in the left lower extremity. Flow is reconstituted in the mid popliteal  artery via collaterals."  -ED discussed with Dr. Foss who recommended outpatient followup  -f/u full vascular recs  -heparin gtt stopped at this point  -obtain Lipids/a1c to risk stratify disease process CT on this admission shows "Occlusion of the superficial femoral artery and proximal popliteal artery in the left lower extremity. Flow is reconstituted in the mid popliteal  artery via collaterals."  -ED discussed with Dr. Foss who recommended outpatient followup  -f/u full vascular recs  -heparin gtt stopped at this point  -obtain Lipids/a1c to risk stratify disease process  -states he had an "abdominal aneurysm" as told to him by [facility]. Given he is a smoker and already has vascular disease, will obtain Abd-US and re-assess. May need further vascular workup.

## 2022-12-03 NOTE — H&P ADULT - MUSCULOSKELETAL COMMENTS
+ straight leg test in both legs, worse on the right. + paravertebral muscle tenderness. able to feel sensation bilaterally

## 2022-12-04 DIAGNOSIS — H53.039 STRABISMIC AMBLYOPIA, UNSPECIFIED EYE: ICD-10-CM

## 2022-12-04 LAB
CULTURE RESULTS: SIGNIFICANT CHANGE UP
HCV AB S/CO SERPL IA: 0.13 S/CO — SIGNIFICANT CHANGE UP (ref 0–0.99)
HCV AB SERPL-IMP: SIGNIFICANT CHANGE UP
SPECIMEN SOURCE: SIGNIFICANT CHANGE UP

## 2022-12-04 PROCEDURE — 72158 MRI LUMBAR SPINE W/O & W/DYE: CPT | Mod: 26

## 2022-12-04 PROCEDURE — 99232 SBSQ HOSP IP/OBS MODERATE 35: CPT

## 2022-12-04 RX ORDER — HYDROMORPHONE HYDROCHLORIDE 2 MG/ML
1 INJECTION INTRAMUSCULAR; INTRAVENOUS; SUBCUTANEOUS ONCE
Refills: 0 | Status: DISCONTINUED | OUTPATIENT
Start: 2022-12-04 | End: 2022-12-04

## 2022-12-04 RX ORDER — ATORVASTATIN CALCIUM 80 MG/1
40 TABLET, FILM COATED ORAL AT BEDTIME
Refills: 0 | Status: DISCONTINUED | OUTPATIENT
Start: 2022-12-04 | End: 2022-12-19

## 2022-12-04 RX ADMIN — GABAPENTIN 400 MILLIGRAM(S): 400 CAPSULE ORAL at 05:43

## 2022-12-04 RX ADMIN — AMLODIPINE BESYLATE 10 MILLIGRAM(S): 2.5 TABLET ORAL at 05:44

## 2022-12-04 RX ADMIN — Medication 975 MILLIGRAM(S): at 05:44

## 2022-12-04 RX ADMIN — GABAPENTIN 400 MILLIGRAM(S): 400 CAPSULE ORAL at 16:10

## 2022-12-04 RX ADMIN — Medication 975 MILLIGRAM(S): at 21:54

## 2022-12-04 RX ADMIN — ATORVASTATIN CALCIUM 40 MILLIGRAM(S): 80 TABLET, FILM COATED ORAL at 21:54

## 2022-12-04 RX ADMIN — HYDROMORPHONE HYDROCHLORIDE 1 MILLIGRAM(S): 2 INJECTION INTRAMUSCULAR; INTRAVENOUS; SUBCUTANEOUS at 12:13

## 2022-12-04 RX ADMIN — CYCLOBENZAPRINE HYDROCHLORIDE 10 MILLIGRAM(S): 10 TABLET, FILM COATED ORAL at 21:54

## 2022-12-04 RX ADMIN — LIDOCAINE 1 PATCH: 4 CREAM TOPICAL at 05:44

## 2022-12-04 RX ADMIN — GABAPENTIN 400 MILLIGRAM(S): 400 CAPSULE ORAL at 21:54

## 2022-12-04 RX ADMIN — LIDOCAINE 1 PATCH: 4 CREAM TOPICAL at 16:08

## 2022-12-04 RX ADMIN — HYDROMORPHONE HYDROCHLORIDE 1 MILLIGRAM(S): 2 INJECTION INTRAMUSCULAR; INTRAVENOUS; SUBCUTANEOUS at 12:15

## 2022-12-04 RX ADMIN — CYCLOBENZAPRINE HYDROCHLORIDE 10 MILLIGRAM(S): 10 TABLET, FILM COATED ORAL at 16:10

## 2022-12-04 RX ADMIN — CYCLOBENZAPRINE HYDROCHLORIDE 10 MILLIGRAM(S): 10 TABLET, FILM COATED ORAL at 05:43

## 2022-12-04 RX ADMIN — Medication 975 MILLIGRAM(S): at 06:45

## 2022-12-04 RX ADMIN — Medication 975 MILLIGRAM(S): at 22:54

## 2022-12-04 RX ADMIN — LIDOCAINE 1 PATCH: 4 CREAM TOPICAL at 08:04

## 2022-12-04 NOTE — PHYSICAL THERAPY INITIAL EVALUATION ADULT - DID THE PATIENT HAVE SURGERY?
This is the hx of ZAFAR a 68 y/o male patient who was admitted to [facility] due to complications of lower back pain affecting medical condition and with subsequent affection on functional mobility./n/a

## 2022-12-04 NOTE — PHYSICAL THERAPY INITIAL EVALUATION ADULT - ADDITIONAL COMMENTS
as per patient he lives with mother in an apartment. pt has 4 stairs with 2 HR's (widely spread) to access complex and then elevator. pt has a cane and is use it independently.

## 2022-12-04 NOTE — PHYSICAL THERAPY INITIAL EVALUATION ADULT - PERTINENT HX OF CURRENT PROBLEM, REHAB EVAL
This is the hx pearl STEPHEN a 68 y/o male patient who was admitted to [facility] due to complications of lower back pain affecting medical condition and with subsequent affection on functional mobility. US Abdominal Aorta 12/3/22: (+) abdominal aortic aneurysm. US Duplex 12/2/22: (-) DVT.

## 2022-12-04 NOTE — PHYSICAL THERAPY INITIAL EVALUATION ADULT - GENERAL OBSERVATIONS, REHAB EVAL
Chart reviewed, pt encountered on the hallway speaking with nursing staff. Platinum, pt was ambulating with cane but antalgic and with decreased balance.

## 2022-12-04 NOTE — PROGRESS NOTE ADULT - PROBLEM SELECTOR PLAN 1
possibly from known sciatica and stenosis vs vascular occlusion/claudication   . MRI previously showed L3-L4 stenosis.. normal straight leg on exam but has pain when laying flat   -repeat MRI on this admission to assess progression  -unlikely to be cord compression - no saddle anesthesia, urinary or fecal incontinence. normal strength   -PT consulted-home pt   -Pain control: increase gabapentin to 400 TID, start flexiril for spasms - 10 TID for 2 days standing, tylenol for PRN. has  been getting motrin daily and was recently on heparin gtt, so would avoid NSAIDs for now. added daily lidocaine patch (says this helps). If these measures do not improve pain, would consider steroids, as patient states it has helped in the past

## 2022-12-04 NOTE — PHYSICAL THERAPY INITIAL EVALUATION ADULT - GAIT TRAINING, PT EVAL
To be able to perform ambulation independently using cane for 400 feet, using proper technique using AD, with proper posture and functional distance at home in 2 weeks.

## 2022-12-04 NOTE — PROGRESS NOTE ADULT - PROBLEM SELECTOR PLAN 3
CT on this admission shows "Occlusion of the superficial femoral artery and proximal popliteal artery in the left lower extremity. Flow is reconstituted in the mid popliteal  artery via collaterals."  -ED discussed with Dr. Foss who recommended outpatient followup  -f/u full vascular recs  -heparin gtt stopped at this point  -obtain Lipids/a1c to risk stratify disease process  -states he had an "abdominal aneurysm" as told to him by [facility]. Given he is a smoker and already has vascular disease, will obtain Abd-US and re-assess. May need further vascular workup.  - will start antiplatelets

## 2022-12-04 NOTE — PROGRESS NOTE ADULT - SUBJECTIVE AND OBJECTIVE BOX
Patient is a 67y old  Male who presents with a chief complaint of lower back pain, lower extremity pain (03 Dec 2022 09:38)      INTERVAL HPI/OVERNIGHT EVENTS: no events     MEDICATIONS  (STANDING):  amLODIPine   Tablet 10 milliGRAM(s) Oral daily  cyclobenzaprine 10 milliGRAM(s) Oral three times a day  gabapentin 400 milliGRAM(s) Oral three times a day  influenza  Vaccine (HIGH DOSE) 0.7 milliLiter(s) IntraMuscular once  lidocaine   4% Patch 1 Patch Transdermal every 24 hours    MEDICATIONS  (PRN):  acetaminophen     Tablet .. 975 milliGRAM(s) Oral every 6 hours PRN Severe Pain (7 - 10)      Allergies    No Known Allergies    Intolerances        REVIEW OF SYSTEMS:  CONSTITUTIONAL: No fever, weight loss  EYES: No eye pain, visual disturbances, or discharge  ENMT:  No difficulty hearing, tinnitus, vertigo; No sinus or throat pain  RESPIRATORY: No cough, wheezing, chills or hemoptysis; No shortness of breath  CARDIOVASCULAR: No chest pain, palpitations, dizziness, or leg swelling  GASTROINTESTINAL: No abdominal or epigastric pain. No nausea, vomiting, or hematemesis; No diarrhea or constipation. No melena or hematochezia.  GENITOURINARY: No dysuria, frequency, hematuria, or incontinence  NEUROLOGICAL: No headaches, memory loss, loss of strength, numbness, or tremors  SKIN: No itching, burning, rashes, or lesions   MUSCULOSKELETAL: No joint pain or swelling; No muscle, back, or extremity pain  PSYCHIATRIC: No depression, anxiety, mood swings, or difficulty sleeping  HEME/LYMPH: No easy bruising, or bleeding gums      Vital Signs Last 24 Hrs  T(C): 36.7 (04 Dec 2022 17:15), Max: 36.7 (04 Dec 2022 12:00)  T(F): 98 (04 Dec 2022 17:15), Max: 98.1 (04 Dec 2022 12:00)  HR: 66 (04 Dec 2022 17:15) (45 - 66)  BP: 122/76 (04 Dec 2022 17:15) (118/72 - 137/75)  BP(mean): --  RR: 16 (04 Dec 2022 17:15) (16 - 18)  SpO2: 95% (04 Dec 2022 17:15) (95% - 100%)    Parameters below as of 04 Dec 2022 17:15  Patient On (Oxygen Delivery Method): room air          PHYSICAL EXAM:  GENERAL: NAD  HEAD:  Atraumatic, Normocephalic  EYES: EOMI, PERRLA, conjunctiva and sclera clear  ENMT: No tonsillar erythema, exudates, or enlargement;   NECK: Supple, Normal thyroid  NERVOUS SYSTEM:  Alert & Oriented X3, Good concentration; Motor Strength 5/5 B/L upper and lower extremities; DTRs 2+ intact and symmetric  CHEST/LUNG: CTABL; No rales, rhonchi, wheezing, or rubs  HEART: Regular rate and rhythm; No murmurs, rubs, or gallops  ABDOMEN: Soft, Nontender, Nondistended; Bowel sounds present  EXTREMITIES:  left foot cold compared the rights. no palpable pulses   LYMPH: No lymphadenopathy noted  SKIN: No rashes or lesions    LABS:                                   14.4   8.64  )-----------( 227      ( 03 Dec 2022 10:10 )             40.9     12-03    138  |  102  |  21  ----------------------------<  107<H>  3.5   |  29  |  0.76    Ca    9.1      03 Dec 2022 10:10    TPro  7.6  /  Alb  3.7  /  TBili  0.5  /  DBili  x   /  AST  17  /  ALT  26  /  AlkPhos  70  12-03    PT/INR - ( 02 Dec 2022 19:00 )   PT: 13.0 sec;   INR: 1.09 ratio         PTT - ( 03 Dec 2022 07:20 )  PTT:46.7 sec        PT/INR - ( 02 Dec 2022 19:00 )   PT: 13.0 sec;   INR: 1.09 ratio         PTT - ( 03 Dec 2022 07:20 )  PTT:46.7 sec    CAPILLARY BLOOD GLUCOSE          RADIOLOGY & ADDITIONAL TESTS:    < from: CT Angio Lower Extremity w/ IV Cont, Bilateral (12.02.22 @ 20:57) >    Infrarenal abdominal aortic aneurysm, measuring 3.1 cm.    Occluded right internal iliac artery and bilateral femoral arteries.    One-vessel runoff to the right foot.    Three-vessel runoff to the left foot, though with thread-like flow in the   anterior and posterior tibial arteries and the peroneal artery. The left  dorsalis pedis artery remains patent.    < end of copied text >      Imaging Personally Reviewed:  [x ] YES  [ ] NO    Consultant(s) Notes Reviewed:  [ ] YES  [ ] NO    Care Discussed with Consultants/Other Providers [ ] YES  [ ] NO

## 2022-12-04 NOTE — CONSULT NOTE ADULT - ASSESSMENT
Interventional Radiology  Evaluate for Procedure: BLE angiogram/intervention    HPI: 67y Male with severe PAF including chronic b/l SFA occlusion    Allergies: nkda  Medications (Abx/Cardiac/Anticoagulation/Blood Products)  amLODIPine   Tablet: 10 milliGRAM(s) Oral (12-04 @ 05:44)  heparin   Injectable: 8000 Unit(s) IV Push (12-02 @ 19:48)    Data:  T(C): 36.7  HR: 66  BP: 122/76  RR: 16  SpO2: 95%    -WBC 8.64 / HgB 14.4 / Hct 40.9 / Plt 227  -Na 138 / Cl 102 / BUN 21 / Glucose 107  -K 3.5 / CO2 29 / Cr 0.76  -ALT 26 / Alk Phos 70 / T.Bili 0.5  -INR -- / PTT 46.7    Radiology: infrarenal aortic ectasia, b/l SFA occlusion chronic    Assessment/Plan:   -67y Male with severe PAD including chronic b/l SFA occlusion  -Severe spinal stenosis.  -Rec ortho work-up, if patient needs surgery, will perform angio/stenting afterwards. Otherwise will plan for angio/stenting Wednesday.  Interventional Radiology  Evaluate for Procedure: BLE angiogram/intervention    HPI: 67y Male with severe PAF including chronic b/l SFA occlusion    Allergies: nkda  Medications (Abx/Cardiac/Anticoagulation/Blood Products)  amLODIPine   Tablet: 10 milliGRAM(s) Oral (12-04 @ 05:44)  heparin   Injectable: 8000 Unit(s) IV Push (12-02 @ 19:48)    Data:  T(C): 36.7  HR: 66  BP: 122/76  RR: 16  SpO2: 95%    -WBC 8.64 / HgB 14.4 / Hct 40.9 / Plt 227  -Na 138 / Cl 102 / BUN 21 / Glucose 107  -K 3.5 / CO2 29 / Cr 0.76  -ALT 26 / Alk Phos 70 / T.Bili 0.5  -INR -- / PTT 46.7    Radiology: infrarenal aortic ectasia, b/l SFA occlusion chronic    Assessment/Plan:   -67y Male with severe PAD including chronic b/l SFA occlusion  -Severe spinal stenosis.  -Rec ortho work-up, if patient needs surgery, will perform angio/stenting afterwards. Otherwise will plan for angio/stenting this week.

## 2022-12-04 NOTE — PHYSICAL THERAPY INITIAL EVALUATION ADULT - PLANNED THERAPY INTERVENTIONS, PT EVAL
To be able to perform stair negotiation with cane device and 1 hand rails Independently to improve access and exiting from home and access to bedrooms, basement and bathrooms by 2 weeks/balance training/bed mobility training/gait training/strengthening/transfer training

## 2022-12-05 PROCEDURE — 70552 MRI BRAIN STEM W/DYE: CPT | Mod: 26

## 2022-12-05 PROCEDURE — 72100 X-RAY EXAM L-S SPINE 2/3 VWS: CPT | Mod: 26

## 2022-12-05 PROCEDURE — 99232 SBSQ HOSP IP/OBS MODERATE 35: CPT

## 2022-12-05 RX ADMIN — Medication 975 MILLIGRAM(S): at 05:28

## 2022-12-05 RX ADMIN — GABAPENTIN 400 MILLIGRAM(S): 400 CAPSULE ORAL at 17:21

## 2022-12-05 RX ADMIN — ATORVASTATIN CALCIUM 40 MILLIGRAM(S): 80 TABLET, FILM COATED ORAL at 22:28

## 2022-12-05 RX ADMIN — AMLODIPINE BESYLATE 10 MILLIGRAM(S): 2.5 TABLET ORAL at 05:28

## 2022-12-05 RX ADMIN — GABAPENTIN 400 MILLIGRAM(S): 400 CAPSULE ORAL at 22:28

## 2022-12-05 RX ADMIN — LIDOCAINE 1 PATCH: 4 CREAM TOPICAL at 07:58

## 2022-12-05 RX ADMIN — CYCLOBENZAPRINE HYDROCHLORIDE 10 MILLIGRAM(S): 10 TABLET, FILM COATED ORAL at 05:28

## 2022-12-05 RX ADMIN — Medication 975 MILLIGRAM(S): at 06:28

## 2022-12-05 RX ADMIN — LIDOCAINE 1 PATCH: 4 CREAM TOPICAL at 17:20

## 2022-12-05 RX ADMIN — Medication 975 MILLIGRAM(S): at 22:27

## 2022-12-05 RX ADMIN — GABAPENTIN 400 MILLIGRAM(S): 400 CAPSULE ORAL at 05:27

## 2022-12-05 RX ADMIN — LIDOCAINE 1 PATCH: 4 CREAM TOPICAL at 05:28

## 2022-12-05 RX ADMIN — Medication 975 MILLIGRAM(S): at 23:27

## 2022-12-05 NOTE — CHART NOTE - NSCHARTNOTEFT_GEN_A_CORE
Pt casted for SAFO to be dispensed after discharge.  Please inform  f he is going to rehab.    Marcell LOPEZ  MGoldberg P&O  [phone]

## 2022-12-05 NOTE — PROGRESS NOTE ADULT - PROBLEM SELECTOR PLAN 3
CT on this admission shows "Occlusion of the superficial femoral artery and proximal popliteal artery in the left lower extremity. Flow is reconstituted in the mid popliteal  artery via collaterals."  -f/u Dr. Foss who recommended outpatient followup  - will start antiplatelets and stain on dc   - refused angiogram inpatient.

## 2022-12-05 NOTE — CONSULT NOTE ADULT - ATTENDING COMMENTS
Above note reviewed. Agree with above.    Patient's clinical presentation and available spinal imaging reviewed.  The patient complains of primarily low back pain and left lower extremity radicular pain with cramping.  He notes a chronic history of low back pain with bilateral lower extremity radicular pain and right foot drop that has been present for approximately 5 years.  He denies any bowel or bladder incontinence, saddle anesthesia, or other spinal complaints at this time.    The patient was evaluated by my colleagues and determined to have an occlusion of his left lower extremity, with recommendation for intervention and antiplatelet treatment.  Given that the patient has had relatively stable radicular symptoms with neurologic deficit that has been chronic and that the chronic deficit is unlikely to improve with surgical management given the longstanding presentation, and also with the patient lacking any bowel or bladder incontinence, saddle anesthesia, or new neurologic deficit, it is reasonable to pursue the revascularization procedure initially.  I would recommend that the patient be off anticoagulation for at least 5 days prior to a orthopedic surgical spinal intervention and at least 5 days after the procedure.    X-rays of the lumbar spine demonstrate overall maintained alignment with diffuse spondylosis.  MRI lumbar spine without contrast demonstrates severe L2-L5 central canal stenosis.    Given that the patient's presentation is more concerning for an acute vascular pathology rather than spinal pathology, it is reasonable to address the vascular pathology first followed by a spinal intervention once the above is completed. This may be discussed as an inpatient if the patient continues to have severe pain after the management by my colleagues, or any change in symptoms.  Otherwise, this may be scheduled as an outpatient.      Vikas Meredith,   Orthopedic and Spine Surgeon  [facility]

## 2022-12-05 NOTE — PROGRESS NOTE ADULT - PROBLEM SELECTOR PLAN 1
possibly from known sciatica and stenosis vs vascular occlusion/claudication   . MRI previously showed L3-L4 stenosis.. normal straight leg on exam but has pain when laying flat   -repeat MRI reviewd and no cord compression seen but does have severe spondylosis   - ortho to see the pt   -Pain control: increase gabapentin to 400 TID, start flexiril for spasms - 10 TID for 2 days standing, tylenol for PRN. has  been getting motrin daily and was recently on heparin gtt, so would avoid NSAIDs for now. added daily lidocaine patch (says this helps).

## 2022-12-05 NOTE — CHART NOTE - NSCHARTNOTEFT_GEN_A_CORE
Patient requires custom AFO for ambulation stability and balance. Patient will need the brace for a period greater than 9 months.

## 2022-12-05 NOTE — PROGRESS NOTE ADULT - SUBJECTIVE AND OBJECTIVE BOX
Patient is a 67y old  Male who presents with a chief complaint of lower back pain, lower extremity pain (03 Dec 2022 09:38)      INTERVAL HPI/OVERNIGHT EVENTS: no events     MEDICATIONS  (STANDING):  amLODIPine   Tablet 10 milliGRAM(s) Oral daily  cyclobenzaprine 10 milliGRAM(s) Oral three times a day  gabapentin 400 milliGRAM(s) Oral three times a day  influenza  Vaccine (HIGH DOSE) 0.7 milliLiter(s) IntraMuscular once  lidocaine   4% Patch 1 Patch Transdermal every 24 hours    MEDICATIONS  (PRN):  acetaminophen     Tablet .. 975 milliGRAM(s) Oral every 6 hours PRN Severe Pain (7 - 10)      Allergies    No Known Allergies    Intolerances        REVIEW OF SYSTEMS:  CONSTITUTIONAL: No fever, weight loss  EYES: No eye pain, visual disturbances, or discharge  ENMT:  No difficulty hearing, tinnitus, vertigo; No sinus or throat pain  RESPIRATORY: No cough, wheezing, chills or hemoptysis; No shortness of breath  CARDIOVASCULAR: No chest pain, palpitations, dizziness, or leg swelling  GASTROINTESTINAL: No abdominal or epigastric pain. No nausea, vomiting, or hematemesis; No diarrhea or constipation. No melena or hematochezia.  GENITOURINARY: No dysuria, frequency, hematuria, or incontinence  NEUROLOGICAL: No headaches, memory loss, loss of strength, numbness, or tremors  SKIN: No itching, burning, rashes, or lesions   MUSCULOSKELETAL: No joint pain or swelling; No muscle, back, or extremity pain  PSYCHIATRIC: No depression, anxiety, mood swings, or difficulty sleeping  HEME/LYMPH: No easy bruising, or bleeding gums      Vital Signs Last 24 Hrs  T(C): 36.6 (05 Dec 2022 11:04), Max: 37 (05 Dec 2022 06:06)  T(F): 97.8 (05 Dec 2022 11:04), Max: 98.6 (05 Dec 2022 06:06)  HR: 55 (05 Dec 2022 11:04) (55 - 66)  BP: 129/73 (05 Dec 2022 11:04) (119/68 - 129/73)  BP(mean): --  RR: 18 (05 Dec 2022 11:04) (16 - 18)  SpO2: 99% (05 Dec 2022 11:04) (95% - 99%)    Parameters below as of 04 Dec 2022 17:15  Patient On (Oxygen Delivery Method): room air            PHYSICAL EXAM:  GENERAL: NAD  HEAD:  Atraumatic, Normocephalic  ENMT: No tonsillar erythema, exudates, or enlargement;   NECK: Supple, Normal thyroid  NERVOUS SYSTEM:  Alert & Oriented X3, Good concentration; Motor Strength 5/5 B/L upper and lower extremities; DTRs 2+ intact and symmetric  CHEST/LUNG: CTABL; No rales, rhonchi, wheezing, or rubs  HEART: Regular rate and rhythm; No murmurs, rubs, or gallops  ABDOMEN: Soft, Nontender, Nondistended; Bowel sounds present  EXTREMITIES:  left foot cold compared the rights. no palpable pulses   LYMPH: No lymphadenopathy noted  SKIN: No rashes or lesions    LABS:                                   14.4   8.64  )-----------( 227      ( 03 Dec 2022 10:10 )             40.9     12-03    138  |  102  |  21  ----------------------------<  107<H>  3.5   |  29  |  0.76    Ca    9.1      03 Dec 2022 10:10    TPro  7.6  /  Alb  3.7  /  TBili  0.5  /  DBili  x   /  AST  17  /  ALT  26  /  AlkPhos  70  12-03    PT/INR - ( 02 Dec 2022 19:00 )   PT: 13.0 sec;   INR: 1.09 ratio         PTT - ( 03 Dec 2022 07:20 )  PTT:46.7 sec        PT/INR - ( 02 Dec 2022 19:00 )   PT: 13.0 sec;   INR: 1.09 ratio         PTT - ( 03 Dec 2022 07:20 )  PTT:46.7 sec    CAPILLARY BLOOD GLUCOSE          RADIOLOGY & ADDITIONAL TESTS:    < from: CT Angio Lower Extremity w/ IV Cont, Bilateral (12.02.22 @ 20:57) >    Infrarenal abdominal aortic aneurysm, measuring 3.1 cm.    Occluded right internal iliac artery and bilateral femoral arteries.    One-vessel runoff to the right foot.    Three-vessel runoff to the left foot, though with thread-like flow in the   anterior and posterior tibial arteries and the peroneal artery. The left  dorsalis pedis artery remains patent.    < end of copied text >      Imaging Personally Reviewed:  [x ] YES  [ ] NO    Consultant(s) Notes Reviewed:  [ ] YES  [ ] NO    Care Discussed with Consultants/Other Providers [ ] YES  [ ] NO

## 2022-12-05 NOTE — CHART NOTE - NSCHARTNOTEFT_GEN_A_CORE
D/w patient at length regarding LE angiogram, as well has his Sister. He is refusing despite sister's advice to proceed, patient understands limb is threatened without angiogram. -D/w patient at length regarding LE angiogram, as well has his Sister. He is refusing despite advice to proceed, patient understands limb is threatened without angiogram.  -Sister will discuss further with patient

## 2022-12-06 LAB
ANION GAP SERPL CALC-SCNC: 4 MMOL/L — LOW (ref 5–17)
BUN SERPL-MCNC: 27 MG/DL — HIGH (ref 7–23)
CALCIUM SERPL-MCNC: 9 MG/DL — SIGNIFICANT CHANGE UP (ref 8.5–10.1)
CHLORIDE SERPL-SCNC: 107 MMOL/L — SIGNIFICANT CHANGE UP (ref 96–108)
CO2 SERPL-SCNC: 28 MMOL/L — SIGNIFICANT CHANGE UP (ref 22–31)
CREAT SERPL-MCNC: 0.92 MG/DL — SIGNIFICANT CHANGE UP (ref 0.5–1.3)
EGFR: 92 ML/MIN/1.73M2 — SIGNIFICANT CHANGE UP
GLUCOSE SERPL-MCNC: 169 MG/DL — HIGH (ref 70–99)
HCT VFR BLD CALC: 40.6 % — SIGNIFICANT CHANGE UP (ref 39–50)
HGB BLD-MCNC: 14.1 G/DL — SIGNIFICANT CHANGE UP (ref 13–17)
INR BLD: 0.99 RATIO — SIGNIFICANT CHANGE UP (ref 0.88–1.16)
MCHC RBC-ENTMCNC: 31.5 PG — SIGNIFICANT CHANGE UP (ref 27–34)
MCHC RBC-ENTMCNC: 34.7 G/DL — SIGNIFICANT CHANGE UP (ref 32–36)
MCV RBC AUTO: 90.8 FL — SIGNIFICANT CHANGE UP (ref 80–100)
NRBC # BLD: 0 /100 WBCS — SIGNIFICANT CHANGE UP (ref 0–0)
PLATELET # BLD AUTO: 212 K/UL — SIGNIFICANT CHANGE UP (ref 150–400)
POTASSIUM SERPL-MCNC: 3.8 MMOL/L — SIGNIFICANT CHANGE UP (ref 3.5–5.3)
POTASSIUM SERPL-SCNC: 3.8 MMOL/L — SIGNIFICANT CHANGE UP (ref 3.5–5.3)
PROTHROM AB SERPL-ACNC: 11.8 SEC — SIGNIFICANT CHANGE UP (ref 10.5–13.4)
RAPID RVP RESULT: SIGNIFICANT CHANGE UP
RBC # BLD: 4.47 M/UL — SIGNIFICANT CHANGE UP (ref 4.2–5.8)
RBC # FLD: 11.7 % — SIGNIFICANT CHANGE UP (ref 10.3–14.5)
SARS-COV-2 RNA SPEC QL NAA+PROBE: SIGNIFICANT CHANGE UP
SODIUM SERPL-SCNC: 139 MMOL/L — SIGNIFICANT CHANGE UP (ref 135–145)
WBC # BLD: 7.91 K/UL — SIGNIFICANT CHANGE UP (ref 3.8–10.5)
WBC # FLD AUTO: 7.91 K/UL — SIGNIFICANT CHANGE UP (ref 3.8–10.5)

## 2022-12-06 PROCEDURE — 99232 SBSQ HOSP IP/OBS MODERATE 35: CPT

## 2022-12-06 PROCEDURE — 70480 CT ORBIT/EAR/FOSSA W/O DYE: CPT | Mod: 26

## 2022-12-06 PROCEDURE — 99223 1ST HOSP IP/OBS HIGH 75: CPT | Mod: FS

## 2022-12-06 RX ORDER — MORPHINE SULFATE 50 MG/1
2 CAPSULE, EXTENDED RELEASE ORAL ONCE
Refills: 0 | Status: DISCONTINUED | OUTPATIENT
Start: 2022-12-06 | End: 2022-12-06

## 2022-12-06 RX ADMIN — MORPHINE SULFATE 2 MILLIGRAM(S): 50 CAPSULE, EXTENDED RELEASE ORAL at 21:19

## 2022-12-06 RX ADMIN — ATORVASTATIN CALCIUM 40 MILLIGRAM(S): 80 TABLET, FILM COATED ORAL at 21:20

## 2022-12-06 RX ADMIN — LIDOCAINE 1 PATCH: 4 CREAM TOPICAL at 17:19

## 2022-12-06 RX ADMIN — GABAPENTIN 400 MILLIGRAM(S): 400 CAPSULE ORAL at 21:20

## 2022-12-06 RX ADMIN — Medication 975 MILLIGRAM(S): at 09:11

## 2022-12-06 RX ADMIN — AMLODIPINE BESYLATE 10 MILLIGRAM(S): 2.5 TABLET ORAL at 06:12

## 2022-12-06 RX ADMIN — LIDOCAINE 1 PATCH: 4 CREAM TOPICAL at 07:18

## 2022-12-06 RX ADMIN — MORPHINE SULFATE 2 MILLIGRAM(S): 50 CAPSULE, EXTENDED RELEASE ORAL at 21:50

## 2022-12-06 RX ADMIN — GABAPENTIN 400 MILLIGRAM(S): 400 CAPSULE ORAL at 17:20

## 2022-12-06 RX ADMIN — LIDOCAINE 1 PATCH: 4 CREAM TOPICAL at 06:12

## 2022-12-06 RX ADMIN — GABAPENTIN 400 MILLIGRAM(S): 400 CAPSULE ORAL at 06:12

## 2022-12-06 RX ADMIN — Medication 975 MILLIGRAM(S): at 10:05

## 2022-12-06 NOTE — PROGRESS NOTE ADULT - SUBJECTIVE AND OBJECTIVE BOX
Patient is a 67y old  Male who presents with a chief complaint of lower back pain, lower extremity pain (03 Dec 2022 09:38)      INTERVAL HPI/OVERNIGHT EVENTS: no events     MEDICATIONS  (STANDING):  amLODIPine   Tablet 10 milliGRAM(s) Oral daily  cyclobenzaprine 10 milliGRAM(s) Oral three times a day  gabapentin 400 milliGRAM(s) Oral three times a day  influenza  Vaccine (HIGH DOSE) 0.7 milliLiter(s) IntraMuscular once  lidocaine   4% Patch 1 Patch Transdermal every 24 hours    MEDICATIONS  (PRN):  acetaminophen     Tablet .. 975 milliGRAM(s) Oral every 6 hours PRN Severe Pain (7 - 10)      Allergies    No Known Allergies    Intolerances        REVIEW OF SYSTEMS:  CONSTITUTIONAL: No fever, weight loss  EYES: No eye pain, visual disturbances, or discharge  ENMT:  No difficulty hearing, tinnitus, vertigo; No sinus or throat pain  RESPIRATORY: No cough, wheezing, chills or hemoptysis; No shortness of breath  CARDIOVASCULAR: No chest pain, palpitations, dizziness, or leg swelling  GASTROINTESTINAL: No abdominal or epigastric pain. No nausea, vomiting, or hematemesis; No diarrhea or constipation. No melena or hematochezia.  GENITOURINARY: No dysuria, frequency, hematuria, or incontinence  NEUROLOGICAL: No headaches, memory loss, loss of strength, numbness, or tremors  SKIN: No itching, burning, rashes, or lesions   MUSCULOSKELETAL: No joint pain or swelling; No muscle, back, or extremity pain  PSYCHIATRIC: No depression, anxiety, mood swings, or difficulty sleeping  HEME/LYMPH: No easy bruising, or bleeding gums      Vital Signs Last 24 Hrs  T(C): 36.8 (06 Dec 2022 16:00), Max: 36.8 (06 Dec 2022 16:00)  T(F): 98.3 (06 Dec 2022 16:00), Max: 98.3 (06 Dec 2022 16:00)  HR: 58 (06 Dec 2022 16:00) (55 - 58)  BP: 138/80 (06 Dec 2022 16:00) (126/73 - 138/80)  BP(mean): --  RR: 18 (06 Dec 2022 16:00) (17 - 18)  SpO2: 97% (06 Dec 2022 16:00) (97% - 99%)    Parameters below as of 06 Dec 2022 16:00  Patient On (Oxygen Delivery Method): room air            PHYSICAL EXAM:  GENERAL: NAD  HEAD:  Atraumatic, Normocephalic  ENMT: No tonsillar erythema, exudates, or enlargement;   NECK: Supple, Normal thyroid  NERVOUS SYSTEM:  Alert & Oriented X3, Good concentration; Motor Strength 5/5 B/L upper and lower extremities; DTRs 2+ intact and symmetric  CHEST/LUNG: CTABL; No rales, rhonchi, wheezing, or rubs  HEART: Regular rate and rhythm; No murmurs, rubs, or gallops  ABDOMEN: Soft, Nontender, Nondistended; Bowel sounds present  EXTREMITIES:  left foot cold compared the rights. no palpable pulses   LYMPH: No lymphadenopathy noted  SKIN: No rashes or lesions    LABS:                                  RADIOLOGY & ADDITIONAL TESTS:    < from: CT Angio Lower Extremity w/ IV Cont, Bilateral (12.02.22 @ 20:57) >    Infrarenal abdominal aortic aneurysm, measuring 3.1 cm.    Occluded right internal iliac artery and bilateral femoral arteries.    One-vessel runoff to the right foot.    Three-vessel runoff to the left foot, though with thread-like flow in the   anterior and posterior tibial arteries and the peroneal artery. The left  dorsalis pedis artery remains patent.    < end of copied text >      Imaging Personally Reviewed:  [x ] YES  [ ] NO    Consultant(s) Notes Reviewed:  [ ] YES  [ ] NO    Care Discussed with Consultants/Other Providers [ ] YES  [ ] NO

## 2022-12-06 NOTE — CONSULT NOTE ADULT - NS ATTEND AMEND GEN_ALL_CORE FT
The patient is seen and examined and I have reviewed the ortho eval and also discussed with Dr Monge, and IR.  The patient has PAD with occluded SFA and it may also be contributing to the patients s/s of pain in the legs.  I would recommend to procceed with Angio and possible angioplasty of left leg and possible right leg. I have discussed with patient as well and he is agreeable.

## 2022-12-06 NOTE — PROGRESS NOTE ADULT - PROBLEM SELECTOR PLAN 1
possibly from known sciatica and stenosis vs vascular occlusion/claudication   . MRI previously showed L3-L4 stenosis.. normal straight leg on exam but has pain when laying flat   -repeat MRI reviewd and no cord compression seen but does have severe spondylosis   - ortho saw pt and will eventually need decompression surgery at some point but is not emergent. its preferable to get the PVD fixed first as hes high risk for complications with during a major surgery with such poor circulation (hypotention, DVT)   -Pain control: increase gabapentin to 400 TID, start flexiril for spasms - 10 TID for 2 days standing, tylenol for PRN. has  been getting motrin daily and was recently on heparin gtt, so would avoid NSAIDs for now. added daily lidocaine patch (says this helps).

## 2022-12-06 NOTE — PROGRESS NOTE ADULT - PROBLEM SELECTOR PLAN 3
CT on this admission shows "Occlusion of the superficial femoral artery and proximal popliteal artery in the left lower extremity. Flow is reconstituted in the mid popliteal  artery via collaterals."  -f/u Dr. Foss  outpatient   - will start antiplatelets and stain on dc   - refused angiogram at first but is now amendable

## 2022-12-06 NOTE — CONSULT NOTE ADULT - ASSESSMENT
67yom w/ hx of HTN, HLD, neuropathy, lumbar stenosis, who presents with acute on chronic back and lower extremity pain    Patient with occluded right internal iliac artery and bilateral femoral arteries and AAA measuring 3.1cm  Angiogram scheduled for tomorrow with interventional radiology  Follow up MINESH/PVR  Patient will require outpatient surveillance of AAA with yearly US and surveillance in first degree relatives  Discussed with Dr. Patel

## 2022-12-06 NOTE — PROGRESS NOTE ADULT - SUBJECTIVE AND OBJECTIVE BOX
Patient seen and examined at bedside.  No acute complaints at this time. Pain well controlled. Denies chest pain, shortness of breath, nausea or vomiting.       LABS:                VITAL SIGNS:  T(C): 36.6 (12-06-22 @ 05:56), Max: 36.9 (12-05-22 @ 17:07)  HR: 55 (12-06-22 @ 05:56) (55 - 57)  BP: 126/73 (12-06-22 @ 05:56) (120/62 - 129/73)  RR: 17 (12-06-22 @ 05:56) (17 - 18)  SpO2: 99% (12-06-22 @ 05:56) (98% - 99%)      Physical Exam:  Gen: NAD  Spine:  Skin intact  No gross deformity  No midline TTP C/T/L/S spine  No bony step offs  No paraspinal muscle ttp/hypertonicity   Negative Straight leg raise  Negative clonus  Negative babinski  positive nunes on the right      Motor:                   C5                C6              C7               C8           T1   R            5/5                5/5            5/5             5/5          5/5  L             5/5               5/5             5/5             5/5          5/5                L2             L3             L4               L5            S1  R         5/5           5/5          1/5             1/5           2/5  L          5/5          5/5           2/5             1/5           2/5    Sensory:            C5         C6         C7      C8       T1        (0=absent, 1=impaired, 2=normal, NT=not testable)  R         2            2           2        2         2  L          2            2           2        2         2               L2          L3         L4      L5       S1         (0=absent, 1=impaired, 2=normal, NT=not testable)  R         1            1            1        1        1  L          1            1           1        1         1    Imaging:   MR Lspine: severe stenosis L3-4, moderate stenosis L4-5, severe recess and foraminal impingement L5-S1    A/P: 67y Male with chronic radicular low back pain and myelopathy    Patient does not endorse red flag symptoms  No acute orthopedic surgical intervention unless pt develops red flag symptoms  Vascular surgery consult to determine whether claudication is likely vascular or neurogenic in origin  Pt would benefit from lumbar decompression surgery to prevent further progression of symptoms, however, if angio is urgent this would be deferred for 6 months while pt is on plavix  If angio is not urgent and pt agrees to spine surgery, will require 5 days off anticoagulation prior to proceeding with angio  AFO for right drop foot  Pain control prn  WBAT with assistive devices as needed  DVT ppx per primary team  Will discuss with Dr. Day and advise with any changes to plan Patient seen and examined at bedside.  No acute complaints at this time. Pain well controlled. Denies chest pain, shortness of breath, nausea or vomiting.       LABS:                VITAL SIGNS:  T(C): 36.6 (12-06-22 @ 05:56), Max: 36.9 (12-05-22 @ 17:07)  HR: 55 (12-06-22 @ 05:56) (55 - 57)  BP: 126/73 (12-06-22 @ 05:56) (120/62 - 129/73)  RR: 17 (12-06-22 @ 05:56) (17 - 18)  SpO2: 99% (12-06-22 @ 05:56) (98% - 99%)      Physical Exam:  Gen: NAD  Spine:  Skin intact  No gross deformity  No midline TTP C/T/L/S spine  No bony step offs  No paraspinal muscle ttp/hypertonicity   Negative Straight leg raise  Negative clonus  Negative babinski  positive nunes on the right      Motor:                   C5                C6              C7               C8           T1   R            5/5                5/5            5/5             5/5          5/5  L             5/5               5/5             5/5             5/5          5/5                L2             L3             L4               L5            S1  R         5/5           5/5          1/5             1/5           2/5  L          5/5          5/5           2/5             1/5           2/5    Sensory:            C5         C6         C7      C8       T1        (0=absent, 1=impaired, 2=normal, NT=not testable)  R         2            2           2        2         2  L          2            2           2        2         2               L2          L3         L4      L5       S1         (0=absent, 1=impaired, 2=normal, NT=not testable)  R         1            1            1        1        1  L          1            1           1        1         1    Imaging:   MR Lspine: severe stenosis L3-4, moderate stenosis L4-5, severe recess and foraminal impingement L5-S1    A/P: 67y Male with chronic radicular low back pain, myelopathy, and chronic cauda equina    Patient does not endorse red flag symptoms  No acute orthopedic surgical intervention unless pt develops red flag symptoms  Vascular surgery consult to determine whether claudication is likely vascular or neurogenic in origin  Pt would benefit from lumbar decompression surgery to prevent further progression of symptoms, however, if angio is urgent this would be deferred for 6 months while pt is on plavix  If angio is not urgent and pt agrees to spine surgery, will require 5 days off anticoagulation prior to proceeding with angio  AFO for right drop foot  Pain control prn  WBAT with assistive devices as needed  DVT ppx per primary team  Will discuss with Dr. Day and advise with any changes to plan

## 2022-12-07 PROCEDURE — 37226: CPT | Mod: LT

## 2022-12-07 PROCEDURE — 93923 UPR/LXTR ART STDY 3+ LVLS: CPT | Mod: 26

## 2022-12-07 PROCEDURE — 99232 SBSQ HOSP IP/OBS MODERATE 35: CPT

## 2022-12-07 RX ORDER — MORPHINE SULFATE 50 MG/1
2 CAPSULE, EXTENDED RELEASE ORAL ONCE
Refills: 0 | Status: DISCONTINUED | OUTPATIENT
Start: 2022-12-07 | End: 2022-12-07

## 2022-12-07 RX ORDER — CLOPIDOGREL BISULFATE 75 MG/1
75 TABLET, FILM COATED ORAL DAILY
Refills: 0 | Status: DISCONTINUED | OUTPATIENT
Start: 2022-12-07 | End: 2022-12-19

## 2022-12-07 RX ORDER — SODIUM CHLORIDE 9 MG/ML
1000 INJECTION, SOLUTION INTRAVENOUS
Refills: 0 | Status: DISCONTINUED | OUTPATIENT
Start: 2022-12-07 | End: 2022-12-08

## 2022-12-07 RX ORDER — HYDROMORPHONE HYDROCHLORIDE 2 MG/ML
1 INJECTION INTRAMUSCULAR; INTRAVENOUS; SUBCUTANEOUS
Refills: 0 | Status: DISCONTINUED | OUTPATIENT
Start: 2022-12-07 | End: 2022-12-08

## 2022-12-07 RX ORDER — ASPIRIN/CALCIUM CARB/MAGNESIUM 324 MG
81 TABLET ORAL DAILY
Refills: 0 | Status: DISCONTINUED | OUTPATIENT
Start: 2022-12-07 | End: 2022-12-19

## 2022-12-07 RX ADMIN — GABAPENTIN 400 MILLIGRAM(S): 400 CAPSULE ORAL at 14:27

## 2022-12-07 RX ADMIN — MORPHINE SULFATE 2 MILLIGRAM(S): 50 CAPSULE, EXTENDED RELEASE ORAL at 02:00

## 2022-12-07 RX ADMIN — SODIUM CHLORIDE 75 MILLILITER(S): 9 INJECTION, SOLUTION INTRAVENOUS at 22:28

## 2022-12-07 RX ADMIN — Medication 81 MILLIGRAM(S): at 21:38

## 2022-12-07 RX ADMIN — HYDROMORPHONE HYDROCHLORIDE 1 MILLIGRAM(S): 2 INJECTION INTRAMUSCULAR; INTRAVENOUS; SUBCUTANEOUS at 20:20

## 2022-12-07 RX ADMIN — GABAPENTIN 400 MILLIGRAM(S): 400 CAPSULE ORAL at 06:46

## 2022-12-07 RX ADMIN — LIDOCAINE 1 PATCH: 4 CREAM TOPICAL at 07:00

## 2022-12-07 RX ADMIN — GABAPENTIN 400 MILLIGRAM(S): 400 CAPSULE ORAL at 22:40

## 2022-12-07 RX ADMIN — SODIUM CHLORIDE 75 MILLILITER(S): 9 INJECTION, SOLUTION INTRAVENOUS at 20:00

## 2022-12-07 RX ADMIN — Medication 975 MILLIGRAM(S): at 09:30

## 2022-12-07 RX ADMIN — AMLODIPINE BESYLATE 10 MILLIGRAM(S): 2.5 TABLET ORAL at 06:46

## 2022-12-07 RX ADMIN — LIDOCAINE 1 PATCH: 4 CREAM TOPICAL at 06:46

## 2022-12-07 RX ADMIN — CLOPIDOGREL BISULFATE 75 MILLIGRAM(S): 75 TABLET, FILM COATED ORAL at 21:30

## 2022-12-07 RX ADMIN — Medication 975 MILLIGRAM(S): at 22:40

## 2022-12-07 RX ADMIN — ATORVASTATIN CALCIUM 40 MILLIGRAM(S): 80 TABLET, FILM COATED ORAL at 22:40

## 2022-12-07 RX ADMIN — Medication 975 MILLIGRAM(S): at 02:27

## 2022-12-07 RX ADMIN — Medication 975 MILLIGRAM(S): at 08:43

## 2022-12-07 RX ADMIN — Medication 975 MILLIGRAM(S): at 23:40

## 2022-12-07 RX ADMIN — MORPHINE SULFATE 2 MILLIGRAM(S): 50 CAPSULE, EXTENDED RELEASE ORAL at 01:44

## 2022-12-07 RX ADMIN — Medication 975 MILLIGRAM(S): at 03:20

## 2022-12-07 NOTE — PROGRESS NOTE ADULT - PROBLEM SELECTOR PLAN 3
CT on this admission shows "Occlusion of the superficial femoral artery and proximal popliteal artery in the left lower extremity. Flow is reconstituted in the mid popliteal  artery via collaterals."  -f/u Dr. Foss  outpatient   - will start antiplatelets and statin   - refused angiogram at first but is now amendable.

## 2022-12-07 NOTE — PROCEDURE NOTE - PROCEDURE FINDINGS AND DETAILS
- good aortoiliac inflow  - bilateral occluded SFA  - occluded left SFA pop art post tib artery ant tib artery and peroneal artery  - reconstitution of flow to the dorsalis pedis artery via collaterals  - successful recannulization, angioplasty, stenting of the SFA, pop, and ant tib artery; good inline flow to the left dorsalis pedis   - right groin sheath removed without hematoma using closure device

## 2022-12-07 NOTE — PRE PROCEDURE NOTE - PRE PROCEDURE EVALUATION
Interventional Radiology    HPI: 67y Male with severe PAD including chronic b/l SFA occlusion presents to IR for Left lower extremity angiogram possible angioplasty/stenting.    Allergies: ampicillin (Unknown)    Medications (Abx/Cardiac/Anticoagulation/Blood Products)  amLODIPine   Tablet: 10 milliGRAM(s) Oral (12-07 @ 06:46)    Data:    T(C): 36.7  HR: 50  BP: 120/78  RR: 17  SpO2: 95%    Exam  General: No acute distress  Chest: Non labored breathing  Abdomen: Non-distended  Extremities: No swelling, warm    -WBC 7.91 / HgB 14.1 / Hct 40.6 / Plt 212  -Na 139 / Cl 107 / BUN 27 / Glucose 169  -K 3.8 / CO2 28 / Cr 0.92  -ALT -- / Alk Phos -- / T.Bili --  -INR0.99    Imaging: reviewed    Plan: 67y Male presents for LLE angiogram possible angioplasty/stenting  -Risks/Benefits/alternatives explained with the patient and/or healthcare proxy and witnessed informed consent obtained.   
Interventional Radiology    HPI: 67y Male with severe PAD including chronic b/l SFA occlusion presents to IR for bilateral lower extremity angio/stenting.  discussed with ortho, no acute need for surgical intervention, no contraindication for angio while in-pt; will f/u with otho outpt.    Allergies: NKDA  Medications (Abx/Cardiac/Anticoagulation/Blood Products)  amLODIPine   Tablet: 10 milliGRAM(s) Oral (12-05 @ 05:28)    Data:    T(C): 37  HR: 55  BP: 119/68  RR: 17  SpO2: 97%    Exam  General: No acute distress  Chest: Non labored breathing  Abdomen: Non-distended  Extremities: No swelling, warm    -WBC 8.64 / HgB 14.4 / Hct 40.9 / Plt 227  -Na 138 / Cl 102 / BUN 21 / Glucose 107  -K 3.5 / CO2 29 / Cr 0.76  -ALT 26 / Alk Phos 70 / T.Bili 0.5  -INR1.09    Imaging: infrarenal aortic ectasia, b/l SFA occlusion chronic    Plan: 67y Male presents for bilateral lower extremity angio/stenting  -Risks/Benefits/alternatives explained with the patient and/or healthcare proxy and witnessed informed consent obtained.   
stated

## 2022-12-07 NOTE — PROCEDURE NOTE - PLAN
- if patient remains in the hospital; plan for IR Monday for RLE angiogram/angioplasty/stenting   - if patient gets discharged; plan for outpt procedure  - ASA 81 indefinitely; Plavix 75mg [id number]

## 2022-12-07 NOTE — PROGRESS NOTE ADULT - PROBLEM SELECTOR PLAN 1
possibly from known sciatica and stenosis vs vascular occlusion/claudication   . MRI previously showed L3-L4 stenosis.. normal straight leg on exam but has pain when laying flat   -repeat MRI reviewd and no cord compression seen but does have severe spondylosis   - ortho saw pt and will eventually need decompression surgery at some point but is not emergent. its preferable to get the PVD fixed first as hes high risk for complications with during a major surgery with such poor circulation (hypotention, DVT). fredi [place] with Dr. Day outpatient   -Pain control: increase gabapentin to 400 TID, start flexiril for spasms - 10 TID for 2 days standing, tylenol for PRN. has  been getting motrin daily and was recently on heparin gtt, so would avoid NSAIDs for now. added daily lidocaine patch (says this helps).

## 2022-12-07 NOTE — PROGRESS NOTE ADULT - SUBJECTIVE AND OBJECTIVE BOX
Patient seen and examined at bedside.  No acute complaints at this time. Pain well controlled. Denies chest pain, shortness of breath, nausea or vomiting.       LABS:                        14.1   7.91  )-----------( 212      ( 06 Dec 2022 23:20 )             40.6     12-06    139  |  107  |  27<H>  ----------------------------<  169<H>  3.8   |  28  |  0.92    Ca    9.0      06 Dec 2022 23:20      PT/INR - ( 06 Dec 2022 23:20 )   PT: 11.8 sec;   INR: 0.99 ratio               VITAL SIGNS:  T(C): 36.8 (12-07-22 @ 06:38), Max: 36.8 (12-06-22 @ 16:00)  HR: 73 (12-07-22 @ 06:38) (58 - 73)  BP: 145/72 (12-07-22 @ 06:38) (129/84 - 145/72)  RR: 18 (12-07-22 @ 06:38) (18 - 18)  SpO2: 97% (12-07-22 @ 06:38) (97% - 97%)      Physical Exam:  Gen: NAD  Spine:  Negative Straight leg raise  Negative clonus  Negative babinski  positive nunes on the right      Motor:                 L2             L3             L4               L5            S1  R         5/5           5/5          1/5             1/5           2/5  L          5/5          5/5           2/5             1/5           2/5    Sensory:               L2          L3         L4      L5       S1         (0=absent, 1=impaired, 2=normal, NT=not testable)  R         1            1            1        1        1  L          1            1           1        1         1    Imaging:   MR Lspine: severe stenosis L3-4, moderate stenosis L4-5, severe recess and foraminal impingement L5-S1    A/P: 67y Male with chronic radicular low back pain, myelopathy, and chronic cauda equina    Patient does not endorse red flag symptoms  No acute orthopedic surgical intervention unless pt develops red flag symptoms  Vascular surgery to take for angio today - pt will require 3 months of DAPT after which surgical treatment options for chronic cauda equina can be pursued  Pt can follow up with Dr. Day outpatient for discussion of surgical treatment options  Pt advised that if he develops bowel or bladder incontinence, saddle anesthesia to return to ED immediately  AFO for right drop foot upon discharge  Pain control prn  WBAT with assistive devices as needed  DVT ppx per primary team  Will discuss with Dr. Day and advise with any changes to plan

## 2022-12-07 NOTE — CHART NOTE - NSCHARTNOTEFT_GEN_A_CORE
Case discussed with orthopedic attending Dr. Day.  Risk of limb threatening vascular stenosis outweighed risk of delaying orthopedic spine surgery to prevent further progression of pts chronic cauda equina.  This was discussed with the patient and pt was advised that he will need to follow up in office with Dr. Day 3 months after angio to discuss surgical treatment options. Pt advised to monitor for red flag symptoms such as bowel/bladder incontinence and to return to ED immediately if those develop.    No acute orthopedic surgical intervention at this time. Pt may be discharged from orthopedic perspective.  Please reconsult as needed.

## 2022-12-07 NOTE — PROGRESS NOTE ADULT - SUBJECTIVE AND OBJECTIVE BOX
POST-OPERATIVE NOTE    Subjective:  Patient is s/p Angiogram recannulization, angioplasty, stenting of the Left SFA, pop, and ant tib artery . Recovering appropriately.     Vital Signs Last 24 Hrs  T(C): 36.3 (07 Dec 2022 20:05), Max: 36.8 (07 Dec 2022 06:38)  T(F): 97.3 (07 Dec 2022 20:05), Max: 98.3 (07 Dec 2022 06:38)  HR: 56 (07 Dec 2022 21:05) (47 - 78)  BP: 131/80 (07 Dec 2022 21:05) (120/78 - 166/99)  BP(mean): --  RR: 20 (07 Dec 2022 21:05) (14 - 20)  SpO2: 96% (07 Dec 2022 21:05) (95% - 100%)    Parameters below as of 07 Dec 2022 21:05  Patient On (Oxygen Delivery Method): nasal cannula      I&O's Detail    06 Dec 2022 07:01  -  07 Dec 2022 07:00  --------------------------------------------------------  IN:  Total IN: 0 mL    OUT:    Blood Loss (mL): 600 mL    Voided (mL): 1050 mL  Total OUT: 1650 mL    Total NET: -1650 mL      07 Dec 2022 07:01  -  07 Dec 2022 21:31  --------------------------------------------------------  IN:  Total IN: 0 mL    OUT:    Voided (mL): 500 mL  Total OUT: 500 mL    Total NET: -500 mL              Physical Exam:  General: NAD, resting comfortably in bed  Pulmonary: Nonlabored breathing, no respiratory distress  Cardiovascular: NSR  Abdominal: soft, NT/ND  Extremities: WWP, dopplerable pulse on left side. Right groin c/d/i soft no evidence of hematoma       LABS:                        14.1   7.91  )-----------( 212      ( 06 Dec 2022 23:20 )             40.6     12-06    139  |  107  |  27<H>  ----------------------------<  169<H>  3.8   |  28  |  0.92    Ca    9.0      06 Dec 2022 23:20      PT/INR - ( 06 Dec 2022 23:20 )   PT: 11.8 sec;   INR: 0.99 ratio           CAPILLARY BLOOD GLUCOSE          Radiology and Additional Studies:    Assessment:  The patient is a 67y Male who is now several hours post-op s/p Angiogram recannulization, angioplasty, stenting of the Left SFA, pop, and ant tib artery    Plan:  - Pain control as needed  - DVT ppx  - may ambulate as tolerated  - F/u AM labs  - discussed with Dr. Ordoñez

## 2022-12-08 PROCEDURE — 99232 SBSQ HOSP IP/OBS MODERATE 35: CPT

## 2022-12-08 RX ORDER — HYDROMORPHONE HYDROCHLORIDE 2 MG/ML
1 INJECTION INTRAMUSCULAR; INTRAVENOUS; SUBCUTANEOUS ONCE
Refills: 0 | Status: DISCONTINUED | OUTPATIENT
Start: 2022-12-08 | End: 2022-12-08

## 2022-12-08 RX ORDER — MORPHINE SULFATE 50 MG/1
2 CAPSULE, EXTENDED RELEASE ORAL EVERY 4 HOURS
Refills: 0 | Status: DISCONTINUED | OUTPATIENT
Start: 2022-12-08 | End: 2022-12-08

## 2022-12-08 RX ORDER — LISINOPRIL 2.5 MG/1
10 TABLET ORAL DAILY
Refills: 0 | Status: DISCONTINUED | OUTPATIENT
Start: 2022-12-08 | End: 2022-12-19

## 2022-12-08 RX ORDER — ENOXAPARIN SODIUM 100 MG/ML
40 INJECTION SUBCUTANEOUS EVERY 24 HOURS
Refills: 0 | Status: DISCONTINUED | OUTPATIENT
Start: 2022-12-08 | End: 2022-12-11

## 2022-12-08 RX ADMIN — Medication 975 MILLIGRAM(S): at 17:09

## 2022-12-08 RX ADMIN — MORPHINE SULFATE 2 MILLIGRAM(S): 50 CAPSULE, EXTENDED RELEASE ORAL at 02:07

## 2022-12-08 RX ADMIN — HYDROMORPHONE HYDROCHLORIDE 1 MILLIGRAM(S): 2 INJECTION INTRAMUSCULAR; INTRAVENOUS; SUBCUTANEOUS at 06:31

## 2022-12-08 RX ADMIN — LIDOCAINE 1 PATCH: 4 CREAM TOPICAL at 17:07

## 2022-12-08 RX ADMIN — GABAPENTIN 400 MILLIGRAM(S): 400 CAPSULE ORAL at 05:29

## 2022-12-08 RX ADMIN — LIDOCAINE 1 PATCH: 4 CREAM TOPICAL at 05:25

## 2022-12-08 RX ADMIN — Medication 81 MILLIGRAM(S): at 12:06

## 2022-12-08 RX ADMIN — GABAPENTIN 400 MILLIGRAM(S): 400 CAPSULE ORAL at 16:01

## 2022-12-08 RX ADMIN — LISINOPRIL 10 MILLIGRAM(S): 2.5 TABLET ORAL at 16:01

## 2022-12-08 RX ADMIN — MORPHINE SULFATE 2 MILLIGRAM(S): 50 CAPSULE, EXTENDED RELEASE ORAL at 01:52

## 2022-12-08 RX ADMIN — ENOXAPARIN SODIUM 40 MILLIGRAM(S): 100 INJECTION SUBCUTANEOUS at 16:02

## 2022-12-08 RX ADMIN — ATORVASTATIN CALCIUM 40 MILLIGRAM(S): 80 TABLET, FILM COATED ORAL at 22:14

## 2022-12-08 RX ADMIN — AMLODIPINE BESYLATE 10 MILLIGRAM(S): 2.5 TABLET ORAL at 05:24

## 2022-12-08 RX ADMIN — LIDOCAINE 1 PATCH: 4 CREAM TOPICAL at 08:04

## 2022-12-08 RX ADMIN — GABAPENTIN 400 MILLIGRAM(S): 400 CAPSULE ORAL at 22:13

## 2022-12-08 RX ADMIN — Medication 975 MILLIGRAM(S): at 16:16

## 2022-12-08 RX ADMIN — CLOPIDOGREL BISULFATE 75 MILLIGRAM(S): 75 TABLET, FILM COATED ORAL at 12:04

## 2022-12-08 RX ADMIN — HYDROMORPHONE HYDROCHLORIDE 1 MILLIGRAM(S): 2 INJECTION INTRAMUSCULAR; INTRAVENOUS; SUBCUTANEOUS at 06:16

## 2022-12-08 NOTE — PROGRESS NOTE ADULT - SUBJECTIVE AND OBJECTIVE BOX
Patient is a 67y old  Male who presents with a chief complaint of lower back pain, lower extremity pain (03 Dec 2022 09:38)    MEDICATIONS  (STANDING):  amLODIPine   Tablet 10 milliGRAM(s) Oral daily  aspirin  chewable 81 milliGRAM(s) Oral daily  atorvastatin 40 milliGRAM(s) Oral at bedtime  clopidogrel Tablet 75 milliGRAM(s) Oral daily  gabapentin 400 milliGRAM(s) Oral three times a day  influenza  Vaccine (HIGH DOSE) 0.7 milliLiter(s) IntraMuscular once  lidocaine   4% Patch 1 Patch Transdermal every 24 hours    MEDICATIONS  (PRN):  acetaminophen     Tablet .. 975 milliGRAM(s) Oral every 6 hours PRN Severe Pain (7 - 10)    Allergies    No Known Allergies    Intolerances  Vital Signs Last 24 Hrs  T(C): 36.4 (08 Dec 2022 09:01), Max: 36.7 (07 Dec 2022 15:50)  T(F): 97.6 (08 Dec 2022 09:01), Max: 98.1 (07 Dec 2022 15:50)  HR: 64 (08 Dec 2022 09:01) (50 - 78)  BP: 154/85 (08 Dec 2022 09:01) (120/78 - 166/99)  BP(mean): --  RR: 19 (08 Dec 2022 09:01) (13 - 20)  SpO2: 95% (08 Dec 2022 09:01) (95% - 100%)    Parameters below as of 08 Dec 2022 09:01  Patient On (Oxygen Delivery Method): room air            PHYSICAL EXAM:  GENERAL: NAD  HEAD:  Atraumatic, Normocephalic  ENMT: No tonsillar erythema, exudates, or enlargement;   NECK: Supple, Normal thyroid  NERVOUS SYSTEM:  Alert & Oriented X3, Good concentration; Motor Strength 5/5 B/L upper and lower extremities; DTRs 2+ intact and symmetric  CHEST/LUNG: CTABL; No rales, rhonchi, wheezing, or rubs  HEART: Regular rate and rhythm; No murmurs, rubs, or gallops  ABDOMEN: Soft, Nontender, Nondistended; Bowel sounds present  EXTREMITIES:  left foot cold compared the rights. no palpable pulses   SKIN: No rashes or lesions    LABS:                                              14.1   7.91  )-----------( 212      ( 06 Dec 2022 23:20 )             40.6   12-06    139  |  107  |  27<H>  ----------------------------<  169<H>  3.8   |  28  |  0.92    Ca    9.0      06 Dec 2022 23:20              RADIOLOGY & ADDITIONAL TESTS:    < from: CT Angio Lower Extremity w/ IV Cont, Bilateral (12.02.22 @ 20:57) >    Infrarenal abdominal aortic aneurysm, measuring 3.1 cm.    Occluded right internal iliac artery and bilateral femoral arteries.    One-vessel runoff to the right foot.    Three-vessel runoff to the left foot, though with thread-like flow in the   anterior and posterior tibial arteries and the peroneal artery. The left  dorsalis pedis artery remains patent.    < end of copied text >      Imaging Personally Reviewed:  [x ] YES  [ ] NO    Consultant(s) Notes Reviewed:  [ ] YES  [ ] NO    Care Discussed with Consultants/Other Providers [ ] YES  [ ] NO Patient is a 67y old  Male who presents with a chief complaint of lower back pain, lower extremity pain (03 Dec 2022 09:38)    MEDICATIONS  (STANDING):  amLODIPine   Tablet 10 milliGRAM(s) Oral daily  aspirin  chewable 81 milliGRAM(s) Oral daily  atorvastatin 40 milliGRAM(s) Oral at bedtime  clopidogrel Tablet 75 milliGRAM(s) Oral daily  gabapentin 400 milliGRAM(s) Oral three times a day  influenza  Vaccine (HIGH DOSE) 0.7 milliLiter(s) IntraMuscular once  lidocaine   4% Patch 1 Patch Transdermal every 24 hours    MEDICATIONS  (PRN):  acetaminophen     Tablet .. 975 milliGRAM(s) Oral every 6 hours PRN Severe Pain (7 - 10)    Allergies    No Known Allergies    Intolerances  Vital Signs Last 24 Hrs  T(C): 36.4 (08 Dec 2022 09:01), Max: 36.7 (07 Dec 2022 15:50)  T(F): 97.6 (08 Dec 2022 09:01), Max: 98.1 (07 Dec 2022 15:50)  HR: 64 (08 Dec 2022 09:01) (50 - 78)  BP: 154/85 (08 Dec 2022 09:01) (120/78 - 166/99)  BP(mean): --  RR: 19 (08 Dec 2022 09:01) (13 - 20)  SpO2: 95% (08 Dec 2022 09:01) (95% - 100%)    Parameters below as of 08 Dec 2022 09:01  Patient On (Oxygen Delivery Method): room air            PHYSICAL EXAM:  GENERAL: NAD  HEAD:  Atraumatic, Normocephalic  ENMT: No tonsillar erythema, exudates, or enlargement;   NECK: Supple, Normal thyroid  NERVOUS SYSTEM:  Alert & Oriented X3, Good concentration; Motor Strength 5/5 B/L upper and lower extremities; DTRs 2+ intact and symmetric  CHEST/LUNG: CTABL; No rales, rhonchi, wheezing, or rubs  HEART: Regular rate and rhythm; No murmurs, rubs, or gallops  ABDOMEN: Soft, Nontender, Nondistended; Bowel sounds present  EXTREMITIES:  left foot cold compared the rights. no palpable pulses overgrown toenails   SKIN: No rashes or lesions    LABS:                                              14.1   7.91  )-----------( 212      ( 06 Dec 2022 23:20 )             40.6   12-06    139  |  107  |  27<H>  ----------------------------<  169<H>  3.8   |  28  |  0.92    Ca    9.0      06 Dec 2022 23:20              RADIOLOGY & ADDITIONAL TESTS:    < from: CT Angio Lower Extremity w/ IV Cont, Bilateral (12.02.22 @ 20:57) >    Infrarenal abdominal aortic aneurysm, measuring 3.1 cm.    Occluded right internal iliac artery and bilateral femoral arteries.    One-vessel runoff to the right foot.    Three-vessel runoff to the left foot, though with thread-like flow in the   anterior and posterior tibial arteries and the peroneal artery. The left  dorsalis pedis artery remains patent.    < end of copied text >      Imaging Personally Reviewed:  [x ] YES  [ ] NO    Consultant(s) Notes Reviewed:  [ ] YES  [ ] NO    Care Discussed with Consultants/Other Providers [ ] YES  [ ] NO

## 2022-12-08 NOTE — PROGRESS NOTE ADULT - PROBLEM SELECTOR PLAN 1
possibly from known sciatica and stenosis vs vascular occlusion/claudication   Per my colleague's  documentation ". MRI previously showed L3-L4 stenosis.. normal straight leg on exam but has pain when laying flat   -repeat MRI reviewd and no cord compression seen but does have severe spondylosis   - ortho saw pt and will eventually need decompression surgery at some point but is not emergent. its preferable to get the PVD fixed first as hes high risk for complications with during a major surgery with such poor circulation (hypotention, DVT). fredi [place]eles with Dr. Day outpatient   -Pain control: increase gabapentin to 400 TID, start flexiril for spasms - 10 TID for 2 days standing, tylenol for PRN. has  been getting motrin daily and was recently on heparin gtt, so would avoid NSAIDs for now. added daily lidocaine patch (says this helps)."

## 2022-12-08 NOTE — PROGRESS NOTE ADULT - PROBLEM SELECTOR PLAN 3
Per my colleague's  documentation "CT on this admission shows "Occlusion of the superficial femoral artery and proximal popliteal artery in the left lower extremity. Flow is reconstituted in the mid popliteal  artery via collaterals."  -f/u Dr. Foss  outpatient   - will start antiplatelets and statin   - refused angiogram at first but is now amendable."        12/8/2022 per IR documentation will arrange for intervention on Monday If patient agrees if not then patient can be discharged and follow up as outpt with the understanding of the significant  risk of limb ischemia /gangrene and necrosis

## 2022-12-09 LAB — BLD GP AB SCN SERPL QL: SIGNIFICANT CHANGE UP

## 2022-12-09 PROCEDURE — 99232 SBSQ HOSP IP/OBS MODERATE 35: CPT

## 2022-12-09 RX ORDER — LACTULOSE 10 G/15ML
10 SOLUTION ORAL
Refills: 0 | Status: COMPLETED | OUTPATIENT
Start: 2022-12-09 | End: 2022-12-11

## 2022-12-09 RX ORDER — KETOROLAC TROMETHAMINE 30 MG/ML
30 SYRINGE (ML) INJECTION ONCE
Refills: 0 | Status: DISCONTINUED | OUTPATIENT
Start: 2022-12-09 | End: 2022-12-09

## 2022-12-09 RX ORDER — CYCLOBENZAPRINE HYDROCHLORIDE 10 MG/1
10 TABLET, FILM COATED ORAL ONCE
Refills: 0 | Status: COMPLETED | OUTPATIENT
Start: 2022-12-09 | End: 2022-12-09

## 2022-12-09 RX ORDER — HYDROMORPHONE HYDROCHLORIDE 2 MG/ML
1 INJECTION INTRAMUSCULAR; INTRAVENOUS; SUBCUTANEOUS EVERY 6 HOURS
Refills: 0 | Status: DISCONTINUED | OUTPATIENT
Start: 2022-12-09 | End: 2022-12-10

## 2022-12-09 RX ADMIN — CLOPIDOGREL BISULFATE 75 MILLIGRAM(S): 75 TABLET, FILM COATED ORAL at 13:02

## 2022-12-09 RX ADMIN — LIDOCAINE 1 PATCH: 4 CREAM TOPICAL at 06:47

## 2022-12-09 RX ADMIN — LIDOCAINE 1 PATCH: 4 CREAM TOPICAL at 07:55

## 2022-12-09 RX ADMIN — GABAPENTIN 400 MILLIGRAM(S): 400 CAPSULE ORAL at 06:47

## 2022-12-09 RX ADMIN — GABAPENTIN 400 MILLIGRAM(S): 400 CAPSULE ORAL at 22:27

## 2022-12-09 RX ADMIN — ATORVASTATIN CALCIUM 40 MILLIGRAM(S): 80 TABLET, FILM COATED ORAL at 22:28

## 2022-12-09 RX ADMIN — Medication 975 MILLIGRAM(S): at 00:11

## 2022-12-09 RX ADMIN — ENOXAPARIN SODIUM 40 MILLIGRAM(S): 100 INJECTION SUBCUTANEOUS at 18:21

## 2022-12-09 RX ADMIN — GABAPENTIN 400 MILLIGRAM(S): 400 CAPSULE ORAL at 15:01

## 2022-12-09 RX ADMIN — Medication 30 MILLIGRAM(S): at 03:25

## 2022-12-09 RX ADMIN — Medication 975 MILLIGRAM(S): at 01:11

## 2022-12-09 RX ADMIN — HYDROMORPHONE HYDROCHLORIDE 1 MILLIGRAM(S): 2 INJECTION INTRAMUSCULAR; INTRAVENOUS; SUBCUTANEOUS at 18:37

## 2022-12-09 RX ADMIN — LIDOCAINE 1 PATCH: 4 CREAM TOPICAL at 18:22

## 2022-12-09 RX ADMIN — HYDROMORPHONE HYDROCHLORIDE 1 MILLIGRAM(S): 2 INJECTION INTRAMUSCULAR; INTRAVENOUS; SUBCUTANEOUS at 18:22

## 2022-12-09 RX ADMIN — CYCLOBENZAPRINE HYDROCHLORIDE 10 MILLIGRAM(S): 10 TABLET, FILM COATED ORAL at 00:49

## 2022-12-09 RX ADMIN — Medication 81 MILLIGRAM(S): at 13:03

## 2022-12-09 RX ADMIN — Medication 30 MILLIGRAM(S): at 03:06

## 2022-12-09 RX ADMIN — AMLODIPINE BESYLATE 10 MILLIGRAM(S): 2.5 TABLET ORAL at 06:47

## 2022-12-09 NOTE — PROGRESS NOTE ADULT - PROBLEM SELECTOR PLAN 1
possibly from known sciatica and stenosis vs vascular occlusion/claudication   Per my colleague's  documentation ". MRI previously showed L3-L4 stenosis.. normal straight leg on exam but has pain when laying flat   -repeat MRI reviewd and no cord compression seen but does have severe spondylosis   - ortho saw pt and will eventually need decompression surgery at some point but is not emergent. its preferable to get the PVD fixed first as hes high risk for complications with during a major surgery with such poor circulation (hypotention, DVT). fredi angeles with Dr. Day outpatient   -Pain control: increase gabapentin to 400 TID, start flexiril for spasms - 10 TID for 2 days standing, tylenol for PRN. has  been getting motrin daily and was recently on heparin gtt, so would avoid NSAIDs for now. added daily lidocaine patch (says this helps)."    12/9/2022 outpt follow up

## 2022-12-09 NOTE — PROGRESS NOTE ADULT - SUBJECTIVE AND OBJECTIVE BOX
Patient is a 67y old  Male who presents with a chief complaint of lower back pain, lower extremity pain (03 Dec 2022 09:38)    MEDICATIONS  (STANDING):  amLODIPine   Tablet 10 milliGRAM(s) Oral daily  aspirin  chewable 81 milliGRAM(s) Oral daily  atorvastatin 40 milliGRAM(s) Oral at bedtime  clopidogrel Tablet 75 milliGRAM(s) Oral daily  enoxaparin Injectable 40 milliGRAM(s) SubCutaneous every 24 hours  gabapentin 400 milliGRAM(s) Oral three times a day  influenza  Vaccine (HIGH DOSE) 0.7 milliLiter(s) IntraMuscular once  lidocaine   4% Patch 1 Patch Transdermal every 24 hours  lisinopril 10 milliGRAM(s) Oral daily    MEDICATIONS  (PRN):  acetaminophen     Tablet .. 975 milliGRAM(s) Oral every 6 hours PRN Severe Pain (7 - 10)    Allergies    No Known Allergies    Intolerances    Vital Signs Last 24 Hrs  T(C): 36.4 (09 Dec 2022 06:04), Max: 37.1 (08 Dec 2022 23:43)  T(F): 97.5 (09 Dec 2022 06:04), Max: 98.8 (08 Dec 2022 23:43)  HR: 45 (09 Dec 2022 06:04) (45 - 68)  BP: 124/69 (09 Dec 2022 06:04) (111/61 - 145/73)  BP(mean): --  RR: 18 (09 Dec 2022 06:04) (17 - 18)  SpO2: 98% (09 Dec 2022 06:04) (96% - 98%)    Parameters below as of 09 Dec 2022 06:04  Patient On (Oxygen Delivery Method): room air      PHYSICAL EXAM:  GENERAL: NAD  HEAD:  Atraumatic, Normocephalic  ENMT: No tonsillar erythema, exudates, or enlargement;   NECK: Supple, Normal thyroid  NERVOUS SYSTEM:  Alert & Oriented X3, Good concentration; Motor Strength 5/5 B/L upper and lower extremities; DTRs 2+ intact and symmetric  CHEST/LUNG: CTABL; No rales, rhonchi, wheezing, or rubs  HEART: Regular rate and rhythm; No murmurs, rubs, or gallops  ABDOMEN: Soft, Nontender, Nondistended; Bowel sounds present  EXTREMITIES:  left foot cold compared the rights. no palpable pulses overgrown toenails   SKIN: No rashes or lesions    LABS:                                           RADIOLOGY & ADDITIONAL TESTS:    < from: CT Angio Lower Extremity w/ IV Cont, Bilateral (12.02.22 @ 20:57) >    Infrarenal abdominal aortic aneurysm, measuring 3.1 cm.    Occluded right internal iliac artery and bilateral femoral arteries.    One-vessel runoff to the right foot.    Three-vessel runoff to the left foot, though with thread-like flow in the   anterior and posterior tibial arteries and the peroneal artery. The left  dorsalis pedis artery remains patent.    < end of copied text >      Imaging Personally Reviewed:  [x ] YES  [ ] NO    Consultant(s) Notes Reviewed:  [ ] YES  [ ] NO    Care Discussed with Consultants/Other Providers [ ] YES  [ ] NO

## 2022-12-09 NOTE — PROGRESS NOTE ADULT - PROBLEM SELECTOR PLAN 3
Per my colleague's  documentation "CT on this admission shows "Occlusion of the superficial femoral artery and proximal popliteal artery in the left lower extremity. Flow is reconstituted in the mid popliteal  artery via collaterals."  -f/u Dr. Foss  outpatient   - will start antiplatelets and statin   - refused angiogram at first but is now amendable."        12/8/2022 per IR documentation will arrange for intervention on Monday If patient agrees if not then patient can be discharged and follow up as outpt with the understanding of the significant  risk of limb ischemia /gangrene and necrosis  12/9/2022 patient  agrees for intervention case d./w dr. Mora who already has patient scheduled for Monday

## 2022-12-09 NOTE — CONSULT NOTE ADULT - ATTENDING COMMENTS
NAils debrided 1-10 Application of betadien solution  Severe PVD awaiting revasc  Drop foot right foot Needs LumBAR EVALUATION AFTER REVASC

## 2022-12-09 NOTE — CONSULT NOTE ADULT - SUBJECTIVE AND OBJECTIVE BOX
Patient is a 67y old  Male who presents with a chief complaint of lower back pain, lower extremity pain (09 Dec 2022 11:31)      HPI:  Patient is a 67yom w/ hx of HTN, HLD, neuropathy, lumbar stenosis, who presents with acute on chronic back and lower extremity pain. Patient states that on 11/21, he had lower back pain that radiated to his b/l lower legs, and was admited to [facility]. They performed some studies (he's unsure exactly what" and discharged him. He presents to [facility] with worsening of his lower back pain, often 10/10 in intensity, and worse when leaning forward, bending down ( ie. to get something from the floor), improves with motrin and gabapentin. Lower back pain radiates down his bilateral legs and has associated numbness and tingling. Pain is "squeezing" and "throbbing" in nature. The pain recently became unbearable and brought him here. He denies any urinary or fecal incontinence, and denies any saddle anesthesia. Denies any fevers or chils, rashes, n/v/headaches. States that at [facility], he was told he had a "abdominal aneurysm has hx of smoking, currently smoked 1.5 packs per day.  (03 Dec 2022 09:38)      PAST MEDICAL & SURGICAL HISTORY:  Prediabetes      Lumbar stenosis      Hearing difficulty, unspecified laterality      No significant past surgical history          MEDICATIONS  (STANDING):  amLODIPine   Tablet 10 milliGRAM(s) Oral daily  aspirin  chewable 81 milliGRAM(s) Oral daily  atorvastatin 40 milliGRAM(s) Oral at bedtime  clopidogrel Tablet 75 milliGRAM(s) Oral daily  enoxaparin Injectable 40 milliGRAM(s) SubCutaneous every 24 hours  gabapentin 400 milliGRAM(s) Oral three times a day  influenza  Vaccine (HIGH DOSE) 0.7 milliLiter(s) IntraMuscular once  lidocaine   4% Patch 1 Patch Transdermal every 24 hours  lisinopril 10 milliGRAM(s) Oral daily    MEDICATIONS  (PRN):  acetaminophen     Tablet .. 975 milliGRAM(s) Oral every 6 hours PRN Severe Pain (7 - 10)      Allergies    ampicillin (Unknown)  pork (Vomiting)  shellfish (Rash)    Intolerances        VITALS:    Vital Signs Last 24 Hrs  T(C): 37.1 (09 Dec 2022 11:35), Max: 37.1 (08 Dec 2022 23:43)  T(F): 98.7 (09 Dec 2022 11:35), Max: 98.8 (08 Dec 2022 23:43)  HR: 54 (09 Dec 2022 11:35) (45 - 67)  BP: 108/65 (09 Dec 2022 11:35) (108/65 - 145/73)  BP(mean): --  RR: 17 (09 Dec 2022 11:35) (17 - 18)  SpO2: 98% (09 Dec 2022 11:35) (97% - 98%)    Parameters below as of 09 Dec 2022 11:35  Patient On (Oxygen Delivery Method): room air        LABS:                CAPILLARY BLOOD GLUCOSE              LOWER EXTREMITY PHYSICAL EXAM:    Vascular: DP/PT 0/4, B/L, CFT prolonged  seconds B/L, Temperature gradient cool to cool, B/L.   Neuro: Epicritic sensation itnact to the level of forefoot B/L.  Musculoskeletal/Ortho: right lower extremity drop foot, muscle strength 1/5 at dorsiflexion eversion, muscle strength 3/5 at plantar flexion, and 2/5 inversion.  L foot muscle strength 5/5 at dorsiflexion plantar flexion, eversion, inversion.   Skin: elongated and dystrophic nails X 10, no open wound bilateral foot.         RADIOLOGY & ADDITIONAL STUDIES:    
Patient seen and examined at bedside with Dr. Patel, patient complaining of lower back pain and foot pain L>R  Denies nausea and vomiting. Tolerating diet.  Denies chest pain, dyspnea, cough.    Chief Complaint:  Patient is a 67y old  Male who presents with a chief complaint of lower back pain, lower extremity pain (06 Dec 2022 06:55)      HPI:  Patient is a 67yom w/ hx of HTN, HLD, neuropathy, lumbar stenosis, who presents with acute on chronic back and lower extremity pain. Patient states that on 11/21, he had lower back pain that radiated to his b/l lower legs, and was admited to [facility]. They performed some studies (he's unsure exactly what" and discharged him. He presents to [facility] with worsening of his lower back pain, often 10/10 in intensity, and worse when leaning forward, bending down ( ie. to get something from the floor), improves with motrin and gabapentin. Lower back pain radiates down his bilateral legs and has associated numbness and tingling. Pain is "squeezing" and "throbbing" in nature. The pain recently became unbearable and brought him here. He denies any urinary or fecal incontinence, and denies any saddle anesthesia. Denies any fevers or chils, rashes, n/v/headaches. States that at [facility], he was told he had a "abdominal aneurysm has hx of smoking, currently smoked 1.5 packs per day.  (03 Dec 2022 09:38)      PMH/PSH:PAST MEDICAL & SURGICAL HISTORY:  Prediabetes      Lumbar stenosis      Hearing difficulty, unspecified laterality      No significant past surgical history          Allergies:  No Known Allergies      Medications:  acetaminophen     Tablet .. 975 milliGRAM(s) Oral every 6 hours PRN  amLODIPine   Tablet 10 milliGRAM(s) Oral daily  atorvastatin 40 milliGRAM(s) Oral at bedtime  gabapentin 400 milliGRAM(s) Oral three times a day  influenza  Vaccine (HIGH DOSE) 0.7 milliLiter(s) IntraMuscular once  lidocaine   4% Patch 1 Patch Transdermal every 24 hours      REVIEW OF SYSTEMS:  All other review of systems is negative unless indicated above.    Relevant Family History:   FAMILY HISTORY:  No pertinent family history in first degree relatives        Relevant Social History:  Denies ETOH or tobacco history    Physical Exam:    Vital Signs:  Vital Signs Last 24 Hrs  T(C): 36.6 (06 Dec 2022 05:56), Max: 36.9 (05 Dec 2022 17:07)  T(F): 97.9 (06 Dec 2022 05:56), Max: 98.5 (05 Dec 2022 17:07)  HR: 55 (06 Dec 2022 05:56) (55 - 57)  BP: 126/73 (06 Dec 2022 05:56) (120/62 - 126/84)  BP(mean): --  RR: 17 (06 Dec 2022 05:56) (17 - 18)  SpO2: 99% (06 Dec 2022 05:56) (98% - 99%)    PHYSICAL EXAM:  General: NAD  Neuro:  Alert & oriented x 3  HEENT: Difficulty hearing. NCAT, EOMI, conjunctiva clear  CV: +S1+S2  Lung: clear to ausculation bilaterally, respirations nonlabored, good inspiratory effort  Abdomen: soft, NTND. Normoactive BS  Extremities: no pedal edema or calf tenderness noted   Vascular: Non-palpable popliteal, DP, PT pulses bilateral. Audible on doppler. Palpable femoral and radial pulses bilateral.      Radiology:    < from: CT Angio Lower Extremity w/ IV Cont, Bilateral (12.02.22 @ 20:57) >    ACC: [id number] EXAM:  CT ANGIO LWR EXT (W)AW IC BI                          PROCEDURE DATE:  12/02/2022          INTERPRETATION:  CLINICAL INFORMATION: Arterial clots.    COMPARISON: None.  TECHNIQUE: Initially, nonenhanced CT was obtained through the calves.   Then, following the rapid administration of intravenous contrast, CT   angiography was performed through the abdomen, pelvis, and lower   extremities down to the toes.  Delayed images through the calves were   also obtained. Sagittal and coronal reformats as well as 3D multiplanar   reconstructions were performed. 90 mL of Omnipaque-350 was administered   intravenously without complication and 10 mL was discarded.    FINDINGS:    ABDOMEN AND PELVIS ARTERIAL SYSTEM:    ABDOMINAL AORTA:Infrarenal abdominal aortic aneurysm, measuring up to   3.1 cm.  CELIAC ARTERY: Patent.  SUPERIOR MESENTERIC ARTERY (SMA): Patent.  INFERIOR MESENTERIC ARTERY: Patent.  RIGHT RENAL ARTERY: Patent.  LEFT RENAL ARTERY: Patent.    RIGHT LOWER EXTREMITY:    COMMON ILIAC ARTERY: Patent.  EXTERNAL ILIAC ARTERY: Patent.  INTERNAL ILIAC ARTERY: Occluded.  COMMON FEMORAL ARTERY: Patent.  FEMORAL ARTERY: Occluded in the thigh.  PROFUNDA FEMORAL ARTERY: Patent.  POPLITEAL ARTERY: Occluded proximally with reconstitution of thread-like   flow in the mid and distal popliteal artery via collaterals.  ANTERIOR TIBIAL ARTERY: Occluded.  TIBIOPERONEAL TRUNK: Patent.  POSTERIOR TIBIAL ARTERY: Diminutive, but patent.  PERONEAL ARTERY: Occluded.    LEFT LOWER EXTREMITY:    COMMON ILIAC ARTERY: Patent.  EXTERNAL ILIAC ARTERY: Patent.  INTERNAL ILIAC ARTERY: Approximately 50% stenosis.  COMMON FEMORAL ARTERY: Patent.  FEMORAL ARTERY: Occluded in the thigh.  PROFUNDA FEMORAL ARTERY: Patent.  POPLITEAL ARTERY: Occluded proximally with reconstitution of thread-like   flow in the mid and distal popliteal artery via collaterals.  ANTERIOR TIBIAL ARTERY: Intermittently occluded proximally with   reconstitution of flow in the distal calf.  TIBIOPERONEAL TRUNK: Severely stenotic.  POSTERIOR TIBIAL ARTERY: Intermittently occluded proximally with   reconstitution of flow in the distal calf.  PERONEAL ARTERY: Intermittently occluded proximally with reconstitution   of flow in the distal calf.    ADDITIONAL FINDINGS: None.    IMPRESSION:    Infrarenal abdominal aortic aneurysm, measuring 3.1 cm.    Occluded right internal iliac artery and bilateral femoral arteries.    One-vessel runoff to the right foot.    Three-vessel runoff to the left foot, though with thread-like flow in the   anterior and posterior tibial arteries and the peroneal artery. The left  dorsalis pedis artery remains patent.      
Patient is a 67y Male w/ PMH preDM, HTN, HLD, peripheral vascular disease, neuropathy, chronic LBP (since 2009, seen multiple physicians but does not recall names) who was admitted to [facility] for radicular back pain/worsening LLE pain & cramping and subsequently found to have an occluded left SFA. Orthopedic spine was consulted to evaluate whether the patient will need urgent/emergent surgery prior to stent placement for the occluded artery. Patient states his low back and radicular pain was present since 2009 and started as bilateral lower extremity pain that radiates down both legs. He has tried steroids, muscle relaxants and now currently takes motrin and gabapentin with minimal relief in his symptoms. Patient is a community ambulator with cane and walker at baseline. He has a drop foot on the right which he states has been present for about 5 years. He does not have or use an AFO brace. Denies bowel/bladder incontinence and subjective saddle anesthesia. Denies fevers/chills. No other complaints at this time.      HEALTH ISSUES - PROBLEM Dx:  Lower back pain    Lumbar stenosis    Peripheral vascular disease    Hypertension    Need for prophylactic measure    Strabismic amblyopia, unspecified laterality            MEDICATIONS  (STANDING):  amLODIPine   Tablet 10 milliGRAM(s) Oral daily  atorvastatin 40 milliGRAM(s) Oral at bedtime  gabapentin 400 milliGRAM(s) Oral three times a day  influenza  Vaccine (HIGH DOSE) 0.7 milliLiter(s) IntraMuscular once  lidocaine   4% Patch 1 Patch Transdermal every 24 hours      Allergies    No Known Allergies    Intolerances        PAST MEDICAL & SURGICAL HISTORY:  No pertinent past medical history    Diabetes    Prediabetes    Lumbar stenosis    Hearing difficulty, unspecified laterality    No significant past surgical history                            Vital Signs Last 24 Hrs  T(C): 37 (12-05-22 @ 06:06), Max: 37 (12-05-22 @ 06:06)  T(F): 98.6 (12-05-22 @ 06:06), Max: 98.6 (12-05-22 @ 06:06)  HR: 55 (12-05-22 @ 06:06) (55 - 66)  BP: 119/68 (12-05-22 @ 06:06) (119/68 - 137/75)  BP(mean): --  RR: 17 (12-05-22 @ 06:06) (16 - 18)  SpO2: 97% (12-05-22 @ 06:06) (95% - 100%)    Physical Exam:  Gen: NAD  Spine:  Skin intact  No gross deformity  No midline TTP C/T/L/S spine  No bony step offs  No paraspinal muscle ttp/hypertonicity   Negative Straight leg raise  Negative clonus  Negative babinski  positive nunes on the right      Motor:                   C5                C6              C7               C8           T1   R            5/5                5/5            5/5             5/5          5/5  L             5/5               5/5             5/5             5/5          5/5                L2             L3             L4               L5            S1  R         5/5           5/5          1/5             1/5           2/5  L          5/5          5/5           5/5             1/5           2/5    Sensory:            C5         C6         C7      C8       T1        (0=absent, 1=impaired, 2=normal, NT=not testable)  R         2            2           2        2         2  L          2            2           2        2         2               L2          L3         L4      L5       S1         (0=absent, 1=impaired, 2=normal, NT=not testable)  R         1            1            1        1        1  L          1            1           1        1         1    Imaging:   MR Lspine: severe stenosis L3-4, moderate stenosis L4-5, severe recess and foraminal impingement L5-S1    A/P: 67y Male with chronic radicular low back pain and myelopathy    Patient does not endorse red flag symptoms  No acute orthopedic surgical intervention  XR Lsp ordered  Pt may follow up outpatient with Dr. Meredith  AFO brace ordered for right drop foot  Pain control prn  WBAT with assistive devices as needed  DVT ppx per primary team  Will discuss with Dr. Meredith and advise with any changes to plan

## 2022-12-09 NOTE — CONSULT NOTE ADULT - ASSESSMENT
67y Male with elongated and dystrophic nails X 10  - Patient seen and evaluated  - vital signs stable-   - Musculoskeletal/Ortho: right lower extremity drop foot, muscle strength 1/5 at dorsiflexion eversion, muscle strength 3/5 at plantar flexion, and 2/5 inversion.  L foot muscle strength 5/5 at dorsiflexion plantar flexion, eversion, inversion.   - Skin: elongated and dystrophic nails X 10, no open wound bilateral foot.   - Aseptic trimming and debridement to all digit to nail only, followed by betadine paint to all digits, tolerated well. This is carried out as a prophylatic meaureemtn to prevent foot wound.   - pt to keep foot Clean Dry  - Pt will need AFO device for right lower extremity, care per Primary.   - Re-consult if new foot wounds or symptoms.   - Seen with attending.

## 2022-12-09 NOTE — CHART NOTE - NSCHARTNOTEFT_GEN_A_CORE
Interventional Radiology    Evaluate for Procedure:     HPI: 67y Male with HTN, HLD, neuropathy, lumbar stenosis, who presents with acute on chronic back and lower extremity pain, admitted for pain control. Also found to have occlusion of femoral artery, from peripheral vascular disease.       Allergies: ampicillin (Unknown)    Medications (Abx/Cardiac/Anticoagulation/Blood Products)    amLODIPine   Tablet: 10 milliGRAM(s) Oral (12-09 @ 06:47)  aspirin  chewable: 81 milliGRAM(s) Oral (12-09 @ 13:03)  clopidogrel Tablet: 75 milliGRAM(s) Oral (12-09 @ 13:02)  enoxaparin Injectable: 40 milliGRAM(s) SubCutaneous (12-08 @ 16:02)  lisinopril: 10 milliGRAM(s) Oral (12-08 @ 16:01)    Data:    T(C): 37.1  HR: 54  BP: 108/65  RR: 17  SpO2: 98%    -WBC 7.91 / HgB 14.1 / Hct 40.6 / Plt 212  -Na 139 / Cl 107 / BUN 27 / Glucose 169  -K 3.8 / CO2 28 / Cr 0.92  -ALT -- / Alk Phos -- / T.Bili --  -INR 0.99 / PTT --    Radiology: CT IMPRESSION:Infrarenal abdominal aortic aneurysm, measuring 3.1 cm. Occluded right internal iliac artery and bilateral femoral arteries. One-vessel runoff to the right foot.  Three-vessel runoff to the left foot, though with thread-like flow in the anterior and posterior tibial arteries and the peroneal artery. The left dorsalis pedis artery remains patent.      Assessment/Plan:   -67y Male with HTN, HLD, neuropathy, lumbar stenosis, who presents with acute on chronic back and lower extremity pain, admitted for pain control. Also found to have occlusion of femoral artery, from peripheral vascular disease. IR consult for RLE angiogram.       - case reviewed and approved for 12/12  - please place IR procedure order under Dr. Ordoñez  - STAT labs in AM (cbc,coags, bmp, T&S)  - hold AC x 24 hours prior to procedure (12/11)  - NPO on 12/11 2508  - COVID PCR results within 5 days of planned procedure  - d/w primary team

## 2022-12-10 LAB
ANION GAP SERPL CALC-SCNC: 5 MMOL/L — SIGNIFICANT CHANGE UP (ref 5–17)
BUN SERPL-MCNC: 16 MG/DL — SIGNIFICANT CHANGE UP (ref 7–23)
CALCIUM SERPL-MCNC: 9.1 MG/DL — SIGNIFICANT CHANGE UP (ref 8.5–10.1)
CHLORIDE SERPL-SCNC: 106 MMOL/L — SIGNIFICANT CHANGE UP (ref 96–108)
CO2 SERPL-SCNC: 29 MMOL/L — SIGNIFICANT CHANGE UP (ref 22–31)
CREAT SERPL-MCNC: 0.75 MG/DL — SIGNIFICANT CHANGE UP (ref 0.5–1.3)
EGFR: 100 ML/MIN/1.73M2 — SIGNIFICANT CHANGE UP
GLUCOSE SERPL-MCNC: 167 MG/DL — HIGH (ref 70–99)
HCT VFR BLD CALC: 37.9 % — LOW (ref 39–50)
HGB BLD-MCNC: 13 G/DL — SIGNIFICANT CHANGE UP (ref 13–17)
MAGNESIUM SERPL-MCNC: 2 MG/DL — SIGNIFICANT CHANGE UP (ref 1.6–2.6)
MCHC RBC-ENTMCNC: 31 PG — SIGNIFICANT CHANGE UP (ref 27–34)
MCHC RBC-ENTMCNC: 34.3 G/DL — SIGNIFICANT CHANGE UP (ref 32–36)
MCV RBC AUTO: 90.2 FL — SIGNIFICANT CHANGE UP (ref 80–100)
NRBC # BLD: 0 /100 WBCS — SIGNIFICANT CHANGE UP (ref 0–0)
PHOSPHATE SERPL-MCNC: 3.7 MG/DL — SIGNIFICANT CHANGE UP (ref 2.5–4.5)
PLATELET # BLD AUTO: 184 K/UL — SIGNIFICANT CHANGE UP (ref 150–400)
POTASSIUM SERPL-MCNC: 3.8 MMOL/L — SIGNIFICANT CHANGE UP (ref 3.5–5.3)
POTASSIUM SERPL-SCNC: 3.8 MMOL/L — SIGNIFICANT CHANGE UP (ref 3.5–5.3)
RBC # BLD: 4.2 M/UL — SIGNIFICANT CHANGE UP (ref 4.2–5.8)
RBC # FLD: 11.9 % — SIGNIFICANT CHANGE UP (ref 10.3–14.5)
SODIUM SERPL-SCNC: 140 MMOL/L — SIGNIFICANT CHANGE UP (ref 135–145)
WBC # BLD: 9.61 K/UL — SIGNIFICANT CHANGE UP (ref 3.8–10.5)
WBC # FLD AUTO: 9.61 K/UL — SIGNIFICANT CHANGE UP (ref 3.8–10.5)

## 2022-12-10 PROCEDURE — 99232 SBSQ HOSP IP/OBS MODERATE 35: CPT

## 2022-12-10 RX ORDER — ACETAMINOPHEN 500 MG
650 TABLET ORAL EVERY 6 HOURS
Refills: 0 | Status: DISCONTINUED | OUTPATIENT
Start: 2022-12-10 | End: 2022-12-14

## 2022-12-10 RX ORDER — DIPHENHYDRAMINE HCL 50 MG
25 CAPSULE ORAL ONCE
Refills: 0 | Status: COMPLETED | OUTPATIENT
Start: 2022-12-10 | End: 2022-12-10

## 2022-12-10 RX ORDER — HYDROMORPHONE HYDROCHLORIDE 2 MG/ML
1 INJECTION INTRAMUSCULAR; INTRAVENOUS; SUBCUTANEOUS EVERY 6 HOURS
Refills: 0 | Status: DISCONTINUED | OUTPATIENT
Start: 2022-12-10 | End: 2022-12-10

## 2022-12-10 RX ORDER — HYDROMORPHONE HYDROCHLORIDE 2 MG/ML
1 INJECTION INTRAMUSCULAR; INTRAVENOUS; SUBCUTANEOUS EVERY 4 HOURS
Refills: 0 | Status: DISCONTINUED | OUTPATIENT
Start: 2022-12-10 | End: 2022-12-10

## 2022-12-10 RX ADMIN — Medication 650 MILLIGRAM(S): at 15:01

## 2022-12-10 RX ADMIN — Medication 81 MILLIGRAM(S): at 13:03

## 2022-12-10 RX ADMIN — ENOXAPARIN SODIUM 40 MILLIGRAM(S): 100 INJECTION SUBCUTANEOUS at 16:15

## 2022-12-10 RX ADMIN — ATORVASTATIN CALCIUM 40 MILLIGRAM(S): 80 TABLET, FILM COATED ORAL at 21:54

## 2022-12-10 RX ADMIN — Medication 975 MILLIGRAM(S): at 03:24

## 2022-12-10 RX ADMIN — LISINOPRIL 10 MILLIGRAM(S): 2.5 TABLET ORAL at 06:09

## 2022-12-10 RX ADMIN — LACTULOSE 10 GRAM(S): 10 SOLUTION ORAL at 18:16

## 2022-12-10 RX ADMIN — LIDOCAINE 1 PATCH: 4 CREAM TOPICAL at 06:10

## 2022-12-10 RX ADMIN — Medication 25 MILLIGRAM(S): at 21:54

## 2022-12-10 RX ADMIN — GABAPENTIN 400 MILLIGRAM(S): 400 CAPSULE ORAL at 13:01

## 2022-12-10 RX ADMIN — HYDROMORPHONE HYDROCHLORIDE 1 MILLIGRAM(S): 2 INJECTION INTRAMUSCULAR; INTRAVENOUS; SUBCUTANEOUS at 16:30

## 2022-12-10 RX ADMIN — HYDROMORPHONE HYDROCHLORIDE 1 MILLIGRAM(S): 2 INJECTION INTRAMUSCULAR; INTRAVENOUS; SUBCUTANEOUS at 16:06

## 2022-12-10 RX ADMIN — Medication 975 MILLIGRAM(S): at 04:24

## 2022-12-10 RX ADMIN — GABAPENTIN 400 MILLIGRAM(S): 400 CAPSULE ORAL at 21:54

## 2022-12-10 RX ADMIN — HYDROMORPHONE HYDROCHLORIDE 1 MILLIGRAM(S): 2 INJECTION INTRAMUSCULAR; INTRAVENOUS; SUBCUTANEOUS at 20:33

## 2022-12-10 RX ADMIN — AMLODIPINE BESYLATE 10 MILLIGRAM(S): 2.5 TABLET ORAL at 06:09

## 2022-12-10 RX ADMIN — GABAPENTIN 400 MILLIGRAM(S): 400 CAPSULE ORAL at 06:09

## 2022-12-10 RX ADMIN — LIDOCAINE 1 PATCH: 4 CREAM TOPICAL at 19:00

## 2022-12-10 RX ADMIN — CLOPIDOGREL BISULFATE 75 MILLIGRAM(S): 75 TABLET, FILM COATED ORAL at 13:01

## 2022-12-10 RX ADMIN — LIDOCAINE 1 PATCH: 4 CREAM TOPICAL at 07:42

## 2022-12-10 RX ADMIN — Medication 650 MILLIGRAM(S): at 16:01

## 2022-12-10 RX ADMIN — HYDROMORPHONE HYDROCHLORIDE 1 MILLIGRAM(S): 2 INJECTION INTRAMUSCULAR; INTRAVENOUS; SUBCUTANEOUS at 20:50

## 2022-12-10 NOTE — PROGRESS NOTE ADULT - SUBJECTIVE AND OBJECTIVE BOX
Patient is a 67y old  Male who presents with a chief complaint of lower back pain, lower extremity pain (03 Dec 2022 09:38)    MEDICATIONS  (STANDING):  amLODIPine   Tablet 10 milliGRAM(s) Oral daily  aspirin  chewable 81 milliGRAM(s) Oral daily  atorvastatin 40 milliGRAM(s) Oral at bedtime  clopidogrel Tablet 75 milliGRAM(s) Oral daily  enoxaparin Injectable 40 milliGRAM(s) SubCutaneous every 24 hours  gabapentin 400 milliGRAM(s) Oral three times a day  influenza  Vaccine (HIGH DOSE) 0.7 milliLiter(s) IntraMuscular once  lactulose Syrup 10 Gram(s) Oral two times a day  lidocaine   4% Patch 1 Patch Transdermal every 24 hours  lisinopril 10 milliGRAM(s) Oral daily    MEDICATIONS  (PRN):  acetaminophen     Tablet .. 650 milliGRAM(s) Oral every 6 hours PRN Temp greater or equal to 38C (100.4F), Mild Pain (1 - 3)  HYDROmorphone  Injectable 1 milliGRAM(s) IV Push every 6 hours PRN Pain  HYDROmorphone  Injectable 1 milliGRAM(s) IV Push every 6 hours PRN Severe Pain (7 - 10)  oxycodone    5 mG/acetaminophen 325 mG 1 Tablet(s) Oral every 6 hours PRN Moderate Pain (4 - 6)      Allergies    No Known Allergies    Intolerances    Vital Signs Last 24 Hrs  T(C): 36.6 (10 Dec 2022 06:03), Max: 37.1 (09 Dec 2022 11:35)  T(F): 97.9 (10 Dec 2022 06:03), Max: 98.7 (09 Dec 2022 11:35)  HR: 57 (10 Dec 2022 06:03) (54 - 57)  BP: 127/64 (10 Dec 2022 06:03) (108/65 - 131/66)  BP(mean): --  RR: 17 (10 Dec 2022 06:03) (17 - 18)  SpO2: 98% (10 Dec 2022 06:03) (98% - 98%)    Parameters below as of 09 Dec 2022 17:33  Patient On (Oxygen Delivery Method): room air        PHYSICAL EXAM:  GENERAL: NAD  HEAD:  Atraumatic, Normocephalic  ENMT: No tonsillar erythema, exudates, or enlargement;   NECK: Supple, Normal thyroid  NERVOUS SYSTEM:  Alert & Oriented X3, Good concentration; Motor Strength 5/5 B/L upper and lower extremities; DTRs 2+ intact and symmetric  CHEST/LUNG: CTABL; No rales, rhonchi, wheezing, or rubs  HEART: Regular rate and rhythm; No murmurs, rubs, or gallops  ABDOMEN: Soft, Nontender, Nondistended; Bowel sounds present  EXTREMITIES:  left foot cold compared the rights. no palpable pulses overgrown toenails   SKIN: No rashes or lesions    LABS:                                                13.0   9.61  )-----------( 184      ( 10 Dec 2022 07:10 )             37.9   12-10    140  |  106  |  16  ----------------------------<  167<H>  3.8   |  29  |  0.75    Ca    9.1      10 Dec 2022 07:10  Phos  3.7     12-10  Mg     2.0     12-10                     RADIOLOGY & ADDITIONAL TESTS:    < from: CT Angio Lower Extremity w/ IV Cont, Bilateral (12.02.22 @ 20:57) >    Infrarenal abdominal aortic aneurysm, measuring 3.1 cm.    Occluded right internal iliac artery and bilateral femoral arteries.    One-vessel runoff to the right foot.    Three-vessel runoff to the left foot, though with thread-like flow in the   anterior and posterior tibial arteries and the peroneal artery. The left  dorsalis pedis artery remains patent.    < end of copied text >      Imaging Personally Reviewed:  [x ] YES  [ ] NO    Consultant(s) Notes Reviewed:  [ ] YES  [ ] NO    Care Discussed with Consultants/Other Providers [ ] YES  [ ] NO

## 2022-12-10 NOTE — CHART NOTE - NSCHARTNOTEFT_GEN_A_CORE
67yom w/ hx of HTN, HLD, neuropathy, lumbar stenosis, who presents with acute on chronic back and lower extremity pain, admitted for pain control. Also found to have occlusion of femoral artery, from peripheral vascular disease. S/P Angiogram recannulization, angioplasty, stenting of the Left SFA, pop, and ant tib artery .   Pt reported generalized itching 1 hour after he received Dilaudid. No prior reaction with previous doses. No rash or hives noted.  denies sore throat or dysphagia. No facial swelling  alert and oriented x4  respiration: no wheezing  Vital Signs Last 24 Hrs  T(C): 36.8 (10 Dec 2022 16:45), Max: 36.8 (10 Dec 2022 16:45)  T(F): 98.2 (10 Dec 2022 16:45), Max: 98.2 (10 Dec 2022 16:45)  HR: 58 (10 Dec 2022 16:45) (57 - 64)  BP: 109/68 (10 Dec 2022 16:45) (109/68 - 127/64)  BP(mean): --  RR: 18 (10 Dec 2022 16:45) (17 - 18)  SpO2: 98% (10 Dec 2022 16:45) (97% - 98%)  Parameters below as of 10 Dec 2022 16:45  Patient On (Oxygen Delivery Method): room air    Plan:  Benadryl 25 mg IV x1  Monitor for rah/hives 67yom w/ hx of HTN, HLD, neuropathy, lumbar stenosis, who presents with acute on chronic back and lower extremity pain, admitted for pain control. Also found to have occlusion of femoral artery, from peripheral vascular disease. S/P Angiogram recannulization, angioplasty, stenting of the Left SFA, pop, and ant tib artery .   Pt reported generalized itching 1 hour after he received Dilaudid. No prior reaction with previous doses. No rash or hives noted.  denies sore throat or dysphagia. No facial swelling  alert and oriented x4  respiration: no wheezing  Vital Signs Last 24 Hrs  T(C): 36.8 (10 Dec 2022 16:45), Max: 36.8 (10 Dec 2022 16:45)  T(F): 98.2 (10 Dec 2022 16:45), Max: 98.2 (10 Dec 2022 16:45)  HR: 58 (10 Dec 2022 16:45) (57 - 64)  BP: 109/68 (10 Dec 2022 16:45) (109/68 - 127/64)  BP(mean): --  RR: 18 (10 Dec 2022 16:45) (17 - 18)  SpO2: 98% (10 Dec 2022 16:45) (97% - 98%)  Parameters below as of 10 Dec 2022 16:45  Patient On (Oxygen Delivery Method): room air    Plan:  Benadryl 25 mg IV x1  Monitor for rah/hives  Hold Dilaudid   F/U by primary team

## 2022-12-10 NOTE — PROGRESS NOTE ADULT - PROBLEM SELECTOR PLAN 3
Per my colleague's  documentation "CT on this admission shows "Occlusion of the superficial femoral artery and proximal popliteal artery in the left lower extremity. Flow is reconstituted in the mid popliteal  artery via collaterals."  -f/u Dr. Foss  outpatient   - will start antiplatelets and statin   - refused angiogram at first but is now amendable."        12/8/2022 per IR documentation will arrange for intervention on Monday If patient agrees if not then patient can be discharged and follow up as outpt with the understanding of the significant  risk of limb ischemia /gangrene and necrosis  12/9/2022 patient  agrees for intervention case d./w dr. Mora who already has patient scheduled for Monday Per my colleague's  documentation "CT on this admission shows "Occlusion of the superficial femoral artery and proximal popliteal artery in the left lower extremity. Flow is reconstituted in the mid popliteal  artery via collaterals."  -f/u Dr. Foss  outpatient   - will start antiplatelets and statin   - refused angiogram at first but is now amendable."        12/8/2022 per IR documentation will arrange for intervention on Monday If patient agrees if not then patient can be discharged and follow up as outpt with the understanding of the significant  risk of limb ischemia /gangrene and necrosis  12/9/2022 patient  agrees for intervention case d./w dr. Mora who already has patient scheduled for Monday  12/10/2022 c/o groin pain at puncture site palpable mass. ordered warm compresses d/w IR dr. Mora supportive care . continue to c/o persistent leg pain that  patient states been there for days . will incerase gabapentin and was started on dilaudid.  on 12/9/2022 will adjust dose

## 2022-12-11 DIAGNOSIS — E11.9 TYPE 2 DIABETES MELLITUS WITHOUT COMPLICATIONS: ICD-10-CM

## 2022-12-11 LAB
RAPID RVP RESULT: SIGNIFICANT CHANGE UP
SARS-COV-2 RNA SPEC QL NAA+PROBE: SIGNIFICANT CHANGE UP

## 2022-12-11 PROCEDURE — 99232 SBSQ HOSP IP/OBS MODERATE 35: CPT

## 2022-12-11 RX ORDER — DIPHENHYDRAMINE HCL 50 MG
25 CAPSULE ORAL EVERY 6 HOURS
Refills: 0 | Status: DISCONTINUED | OUTPATIENT
Start: 2022-12-11 | End: 2022-12-19

## 2022-12-11 RX ORDER — HYDROMORPHONE HYDROCHLORIDE 2 MG/ML
1 INJECTION INTRAMUSCULAR; INTRAVENOUS; SUBCUTANEOUS EVERY 6 HOURS
Refills: 0 | Status: DISCONTINUED | OUTPATIENT
Start: 2022-12-11 | End: 2022-12-15

## 2022-12-11 RX ORDER — GABAPENTIN 400 MG/1
600 CAPSULE ORAL EVERY 8 HOURS
Refills: 0 | Status: DISCONTINUED | OUTPATIENT
Start: 2022-12-11 | End: 2022-12-19

## 2022-12-11 RX ORDER — LACTULOSE 10 G/15ML
20 SOLUTION ORAL DAILY
Refills: 0 | Status: COMPLETED | OUTPATIENT
Start: 2022-12-11 | End: 2022-12-14

## 2022-12-11 RX ADMIN — AMLODIPINE BESYLATE 10 MILLIGRAM(S): 2.5 TABLET ORAL at 06:50

## 2022-12-11 RX ADMIN — LISINOPRIL 10 MILLIGRAM(S): 2.5 TABLET ORAL at 06:50

## 2022-12-11 RX ADMIN — GABAPENTIN 600 MILLIGRAM(S): 400 CAPSULE ORAL at 13:10

## 2022-12-11 RX ADMIN — LACTULOSE 10 GRAM(S): 10 SOLUTION ORAL at 06:50

## 2022-12-11 RX ADMIN — ATORVASTATIN CALCIUM 40 MILLIGRAM(S): 80 TABLET, FILM COATED ORAL at 22:54

## 2022-12-11 RX ADMIN — GABAPENTIN 400 MILLIGRAM(S): 400 CAPSULE ORAL at 06:50

## 2022-12-11 RX ADMIN — HYDROMORPHONE HYDROCHLORIDE 1 MILLIGRAM(S): 2 INJECTION INTRAMUSCULAR; INTRAVENOUS; SUBCUTANEOUS at 19:00

## 2022-12-11 RX ADMIN — LIDOCAINE 1 PATCH: 4 CREAM TOPICAL at 18:17

## 2022-12-11 RX ADMIN — GABAPENTIN 600 MILLIGRAM(S): 400 CAPSULE ORAL at 22:53

## 2022-12-11 RX ADMIN — LIDOCAINE 1 PATCH: 4 CREAM TOPICAL at 06:50

## 2022-12-11 RX ADMIN — HYDROMORPHONE HYDROCHLORIDE 1 MILLIGRAM(S): 2 INJECTION INTRAMUSCULAR; INTRAVENOUS; SUBCUTANEOUS at 18:26

## 2022-12-11 RX ADMIN — Medication 650 MILLIGRAM(S): at 12:31

## 2022-12-11 RX ADMIN — Medication 650 MILLIGRAM(S): at 11:31

## 2022-12-11 RX ADMIN — LACTULOSE 20 GRAM(S): 10 SOLUTION ORAL at 18:26

## 2022-12-11 RX ADMIN — LIDOCAINE 1 PATCH: 4 CREAM TOPICAL at 07:35

## 2022-12-11 NOTE — PROGRESS NOTE ADULT - PROBLEM SELECTOR PLAN 7
a1c at 6.9    patient was seen by nutritionist and diet changed but he doesn't want to be on any diabetic diet and was insistent about changing diet back to what is was before.

## 2022-12-11 NOTE — DIETITIAN INITIAL EVALUATION ADULT - PERTINENT MEDS FT
MEDICATIONS  (STANDING):  amLODIPine   Tablet 10 milliGRAM(s) Oral daily  aspirin  chewable 81 milliGRAM(s) Oral daily  atorvastatin 40 milliGRAM(s) Oral at bedtime  clopidogrel Tablet 75 milliGRAM(s) Oral daily  enoxaparin Injectable 40 milliGRAM(s) SubCutaneous every 24 hours  gabapentin 600 milliGRAM(s) Oral every 8 hours  influenza  Vaccine (HIGH DOSE) 0.7 milliLiter(s) IntraMuscular once  lidocaine   4% Patch 1 Patch Transdermal every 24 hours  lisinopril 10 milliGRAM(s) Oral daily    MEDICATIONS  (PRN):  acetaminophen     Tablet .. 650 milliGRAM(s) Oral every 6 hours PRN Temp greater or equal to 38C (100.4F), Mild Pain (1 - 3)  oxycodone    5 mG/acetaminophen 325 mG 1 Tablet(s) Oral every 6 hours PRN Moderate Pain (4 - 6)

## 2022-12-11 NOTE — PROGRESS NOTE ADULT - PROBLEM SELECTOR PLAN 3
Per my colleague's  documentation "CT on this admission shows "Occlusion of the superficial femoral artery and proximal popliteal artery in the left lower extremity. Flow is reconstituted in the mid popliteal  artery via collaterals."  -f/u Dr. Foss  outpatient   - will start antiplatelets and statin   - refused angiogram at first but is now amendable."        12/8/2022 per IR documentation will arrange for intervention on Monday If patient agrees if not then patient can be discharged and follow up as outpt with the understanding of the significant  risk of limb ischemia /gangrene and necrosis  12/9/2022 patient  agrees for intervention case d./w dr. Mora who already has patient scheduled for Monday  12/10/2022 c/o groin pain at puncture site palpable mass. ordered warm compresses d/w IR dr. Mora supportive care . continue to c/o persistent leg pain that  patient states been there for days . will incerase gabapentin and was started on dilaudid.  on 12/9/2022 will adjust dose

## 2022-12-11 NOTE — DIETITIAN INITIAL EVALUATION ADULT - OTHER INFO
Pt reports he is in pain- RN aware; limited information obtained from pt.     Denies difficulty chewing/swallowing. Reports weight stable;  pounds.     Reports history of "borderline" diabetes. HbA1c 6.9% indicates diabetes.    Will reattempt nutrition education on follow-up as per protocol. Pt made aware RD remains available.

## 2022-12-11 NOTE — DIETITIAN INITIAL EVALUATION ADULT - DIET TYPE
Glucerna x 2/day (provides 440 kcal, 20 g protein)/low sodium/consistent carbohydrate (evening snack)

## 2022-12-11 NOTE — DIETITIAN INITIAL EVALUATION ADULT - ENERGY INTAKE
Per flow sheets pt consuming % of documented meals; observed ~75% of lunch consumed at time of visit.

## 2022-12-11 NOTE — DIETITIAN INITIAL EVALUATION ADULT - PERTINENT LABORATORY DATA
12-10    140  |  106  |  16  ----------------------------<  167<H>  3.8   |  29  |  0.75    Ca    9.1      10 Dec 2022 07:10  Phos  3.7     12-10  Mg     2.0     12-10    A1C with Estimated Average Glucose Result: 6.9 % (12-03-22 @ 10:10)

## 2022-12-11 NOTE — PROGRESS NOTE ADULT - SUBJECTIVE AND OBJECTIVE BOX
Patient is a 67y old  Male who presents with a chief complaint of lower back pain, lower extremity pain (03 Dec 2022 09:38)      MEDICATIONS  (STANDING):  amLODIPine   Tablet 10 milliGRAM(s) Oral daily  aspirin  chewable 81 milliGRAM(s) Oral daily  atorvastatin 40 milliGRAM(s) Oral at bedtime  clopidogrel Tablet 75 milliGRAM(s) Oral daily  enoxaparin Injectable 40 milliGRAM(s) SubCutaneous every 24 hours  gabapentin 400 milliGRAM(s) Oral three times a day  influenza  Vaccine (HIGH DOSE) 0.7 milliLiter(s) IntraMuscular once  lidocaine   4% Patch 1 Patch Transdermal every 24 hours  lisinopril 10 milliGRAM(s) Oral daily    MEDICATIONS  (PRN):  acetaminophen     Tablet .. 650 milliGRAM(s) Oral every 6 hours PRN Temp greater or equal to 38C (100.4F), Mild Pain (1 - 3)  oxycodone    5 mG/acetaminophen 325 mG 1 Tablet(s) Oral every 6 hours PRN Moderate Pain (4 - 6)      Allergies    No Known Allergies    Intolerances    Vital Signs Last 24 Hrs  T(C): 36.7 (11 Dec 2022 06:32), Max: 36.8 (10 Dec 2022 16:45)  T(F): 98 (11 Dec 2022 06:32), Max: 98.2 (10 Dec 2022 16:45)  HR: 58 (11 Dec 2022 06:32) (57 - 64)  BP: 130/74 (11 Dec 2022 06:32) (109/68 - 132/74)  BP(mean): --  RR: 18 (11 Dec 2022 06:32) (18 - 18)  SpO2: 98% (11 Dec 2022 06:32) (97% - 98%)    Parameters below as of 10 Dec 2022 16:45  Patient On (Oxygen Delivery Method): room air          PHYSICAL EXAM:  GENERAL: NAD  HEAD:  Atraumatic, Normocephalic  ENMT: No tonsillar erythema, exudates, or enlargement;   NECK: Supple, Normal thyroid  NERVOUS SYSTEM:  Alert & Oriented X3, Good concentration; Motor Strength 5/5 B/L upper and lower extremities; DTRs 2+ intact and symmetric  CHEST/LUNG: CTABL; No rales, rhonchi, wheezing, or rubs  HEART: Regular rate and rhythm; No murmurs, rubs, or gallops  ABDOMEN: Soft, Nontender, Nondistended; Bowel sounds present  EXTREMITIES:  left foot cold compared the rights. no palpable pulses overgrown toenails   SKIN: No rashes or lesions    LABS:                                                    RADIOLOGY & ADDITIONAL TESTS:    < from: CT Angio Lower Extremity w/ IV Cont, Bilateral (12.02.22 @ 20:57) >    Infrarenal abdominal aortic aneurysm, measuring 3.1 cm.    Occluded right internal iliac artery and bilateral femoral arteries.    One-vessel runoff to the right foot.    Three-vessel runoff to the left foot, though with thread-like flow in the   anterior and posterior tibial arteries and the peroneal artery. The left  dorsalis pedis artery remains patent.    < end of copied text >      Imaging Personally Reviewed:  [x ] YES  [ ] NO    Consultant(s) Notes Reviewed:  [ ] YES  [ ] NO    Care Discussed with Consultants/Other Providers [ ] YES  [ ] NO

## 2022-12-12 LAB
ALBUMIN SERPL ELPH-MCNC: 3.2 G/DL — LOW (ref 3.3–5)
ALP SERPL-CCNC: 73 U/L — SIGNIFICANT CHANGE UP (ref 40–120)
ALT FLD-CCNC: 62 U/L — SIGNIFICANT CHANGE UP (ref 12–78)
ANION GAP SERPL CALC-SCNC: 4 MMOL/L — LOW (ref 5–17)
APTT BLD: 29.8 SEC — SIGNIFICANT CHANGE UP (ref 27.5–35.5)
AST SERPL-CCNC: 28 U/L — SIGNIFICANT CHANGE UP (ref 15–37)
BASOPHILS # BLD AUTO: 0.04 K/UL — SIGNIFICANT CHANGE UP (ref 0–0.2)
BASOPHILS NFR BLD AUTO: 0.3 % — SIGNIFICANT CHANGE UP (ref 0–2)
BILIRUB SERPL-MCNC: 0.4 MG/DL — SIGNIFICANT CHANGE UP (ref 0.2–1.2)
BUN SERPL-MCNC: 15 MG/DL — SIGNIFICANT CHANGE UP (ref 7–23)
CALCIUM SERPL-MCNC: 9.3 MG/DL — SIGNIFICANT CHANGE UP (ref 8.5–10.1)
CHLORIDE SERPL-SCNC: 103 MMOL/L — SIGNIFICANT CHANGE UP (ref 96–108)
CO2 SERPL-SCNC: 29 MMOL/L — SIGNIFICANT CHANGE UP (ref 22–31)
CREAT SERPL-MCNC: 0.8 MG/DL — SIGNIFICANT CHANGE UP (ref 0.5–1.3)
EGFR: 98 ML/MIN/1.73M2 — SIGNIFICANT CHANGE UP
EOSINOPHIL # BLD AUTO: 0.44 K/UL — SIGNIFICANT CHANGE UP (ref 0–0.5)
EOSINOPHIL NFR BLD AUTO: 3.5 % — SIGNIFICANT CHANGE UP (ref 0–6)
GLUCOSE SERPL-MCNC: 174 MG/DL — HIGH (ref 70–99)
HCT VFR BLD CALC: 36.3 % — LOW (ref 39–50)
HGB BLD-MCNC: 13 G/DL — SIGNIFICANT CHANGE UP (ref 13–17)
IMM GRANULOCYTES NFR BLD AUTO: 0.5 % — SIGNIFICANT CHANGE UP (ref 0–0.9)
INR BLD: 1.12 RATIO — SIGNIFICANT CHANGE UP (ref 0.88–1.16)
LYMPHOCYTES # BLD AUTO: 17.5 % — SIGNIFICANT CHANGE UP (ref 13–44)
LYMPHOCYTES # BLD AUTO: 2.18 K/UL — SIGNIFICANT CHANGE UP (ref 1–3.3)
MAGNESIUM SERPL-MCNC: 2.1 MG/DL — SIGNIFICANT CHANGE UP (ref 1.6–2.6)
MCHC RBC-ENTMCNC: 31.7 PG — SIGNIFICANT CHANGE UP (ref 27–34)
MCHC RBC-ENTMCNC: 35.8 G/DL — SIGNIFICANT CHANGE UP (ref 32–36)
MCV RBC AUTO: 88.5 FL — SIGNIFICANT CHANGE UP (ref 80–100)
MONOCYTES # BLD AUTO: 1.23 K/UL — HIGH (ref 0–0.9)
MONOCYTES NFR BLD AUTO: 9.9 % — SIGNIFICANT CHANGE UP (ref 2–14)
NEUTROPHILS # BLD AUTO: 8.49 K/UL — HIGH (ref 1.8–7.4)
NEUTROPHILS NFR BLD AUTO: 68.3 % — SIGNIFICANT CHANGE UP (ref 43–77)
NRBC # BLD: 0 /100 WBCS — SIGNIFICANT CHANGE UP (ref 0–0)
PHOSPHATE SERPL-MCNC: 3.5 MG/DL — SIGNIFICANT CHANGE UP (ref 2.5–4.5)
PLATELET # BLD AUTO: 193 K/UL — SIGNIFICANT CHANGE UP (ref 150–400)
POTASSIUM SERPL-MCNC: 3.7 MMOL/L — SIGNIFICANT CHANGE UP (ref 3.5–5.3)
POTASSIUM SERPL-SCNC: 3.7 MMOL/L — SIGNIFICANT CHANGE UP (ref 3.5–5.3)
PROT SERPL-MCNC: 7.1 GM/DL — SIGNIFICANT CHANGE UP (ref 6–8.3)
PROTHROM AB SERPL-ACNC: 13.4 SEC — SIGNIFICANT CHANGE UP (ref 10.5–13.4)
RBC # BLD: 4.1 M/UL — LOW (ref 4.2–5.8)
RBC # FLD: 11.8 % — SIGNIFICANT CHANGE UP (ref 10.3–14.5)
SODIUM SERPL-SCNC: 136 MMOL/L — SIGNIFICANT CHANGE UP (ref 135–145)
WBC # BLD: 12.44 K/UL — HIGH (ref 3.8–10.5)
WBC # FLD AUTO: 12.44 K/UL — HIGH (ref 3.8–10.5)

## 2022-12-12 PROCEDURE — 99232 SBSQ HOSP IP/OBS MODERATE 35: CPT

## 2022-12-12 RX ORDER — ENOXAPARIN SODIUM 100 MG/ML
40 INJECTION SUBCUTANEOUS EVERY 24 HOURS
Refills: 0 | Status: DISCONTINUED | OUTPATIENT
Start: 2022-12-12 | End: 2022-12-19

## 2022-12-12 RX ORDER — SENNA PLUS 8.6 MG/1
2 TABLET ORAL AT BEDTIME
Refills: 0 | Status: DISCONTINUED | OUTPATIENT
Start: 2022-12-12 | End: 2022-12-19

## 2022-12-12 RX ORDER — MINERAL OIL
133 OIL (ML) MISCELLANEOUS ONCE
Refills: 0 | Status: COMPLETED | OUTPATIENT
Start: 2022-12-12 | End: 2022-12-12

## 2022-12-12 RX ADMIN — AMLODIPINE BESYLATE 10 MILLIGRAM(S): 2.5 TABLET ORAL at 06:45

## 2022-12-12 RX ADMIN — LIDOCAINE 1 PATCH: 4 CREAM TOPICAL at 18:37

## 2022-12-12 RX ADMIN — SENNA PLUS 2 TABLET(S): 8.6 TABLET ORAL at 23:01

## 2022-12-12 RX ADMIN — Medication 650 MILLIGRAM(S): at 16:03

## 2022-12-12 RX ADMIN — Medication 133 MILLILITER(S): at 20:37

## 2022-12-12 RX ADMIN — LIDOCAINE 1 PATCH: 4 CREAM TOPICAL at 06:45

## 2022-12-12 RX ADMIN — HYDROMORPHONE HYDROCHLORIDE 1 MILLIGRAM(S): 2 INJECTION INTRAMUSCULAR; INTRAVENOUS; SUBCUTANEOUS at 23:20

## 2022-12-12 RX ADMIN — HYDROMORPHONE HYDROCHLORIDE 1 MILLIGRAM(S): 2 INJECTION INTRAMUSCULAR; INTRAVENOUS; SUBCUTANEOUS at 15:05

## 2022-12-12 RX ADMIN — ATORVASTATIN CALCIUM 40 MILLIGRAM(S): 80 TABLET, FILM COATED ORAL at 23:03

## 2022-12-12 RX ADMIN — HYDROMORPHONE HYDROCHLORIDE 1 MILLIGRAM(S): 2 INJECTION INTRAMUSCULAR; INTRAVENOUS; SUBCUTANEOUS at 23:04

## 2022-12-12 RX ADMIN — GABAPENTIN 600 MILLIGRAM(S): 400 CAPSULE ORAL at 23:01

## 2022-12-12 RX ADMIN — HYDROMORPHONE HYDROCHLORIDE 1 MILLIGRAM(S): 2 INJECTION INTRAMUSCULAR; INTRAVENOUS; SUBCUTANEOUS at 07:00

## 2022-12-12 RX ADMIN — ENOXAPARIN SODIUM 40 MILLIGRAM(S): 100 INJECTION SUBCUTANEOUS at 18:36

## 2022-12-12 RX ADMIN — HYDROMORPHONE HYDROCHLORIDE 1 MILLIGRAM(S): 2 INJECTION INTRAMUSCULAR; INTRAVENOUS; SUBCUTANEOUS at 14:50

## 2022-12-12 RX ADMIN — HYDROMORPHONE HYDROCHLORIDE 1 MILLIGRAM(S): 2 INJECTION INTRAMUSCULAR; INTRAVENOUS; SUBCUTANEOUS at 06:44

## 2022-12-12 RX ADMIN — GABAPENTIN 600 MILLIGRAM(S): 400 CAPSULE ORAL at 06:44

## 2022-12-12 RX ADMIN — LIDOCAINE 1 PATCH: 4 CREAM TOPICAL at 08:03

## 2022-12-12 RX ADMIN — Medication 650 MILLIGRAM(S): at 17:03

## 2022-12-12 NOTE — PROGRESS NOTE ADULT - PROBLEM SELECTOR PLAN 3
Per my colleague's  documentation "CT on this admission shows "Occlusion of the superficial femoral artery and proximal popliteal artery in the left lower extremity. Flow is reconstituted in the mid popliteal  artery via collaterals."  -f/u Dr. Foss  outpatient   - will start antiplatelets and statin   - refused angiogram at first but is now amendable."        12/8/2022 per IR documentation will arrange for intervention on Monday If patient agrees if not then patient can be discharged and follow up as outpt with the understanding of the significant  risk of limb ischemia /gangrene and necrosis  12/9/2022 patient  agrees for intervention case d./w dr. Mora who already has patient scheduled for Monday  12/10/2022 c/o groin pain at puncture site palpable mass. ordered warm compresses d/w IR dr. Mora supportive care . continue to c/o persistent leg pain that  patient states been there for days . will increase gabapentin and was started on Dilaudid.  on 12/9/2022 will adjust dose  12/12/2022 pt refusing surgery after arranging with IR see their note dated today for details. patient  longstanding neuropathic pain in left leg since 2013 and I have d/w him in detail that despite revascularization his neuropathic pain may not get better as it has been longstanding in nature and  continue with pain management. I have also explained in detail he is at risk of limb ischemia of his right leg. he was scheduled for revascularization o f the right but refused it on today per IR documentation . Per my colleague's  documentation "CT on this admission shows "Occlusion of the superficial femoral artery and proximal popliteal artery in the left lower extremity. Flow is reconstituted in the mid popliteal  artery via collaterals."  -f/u Dr. Foss  outpatient   - will start antiplatelets and statin   - refused angiogram at first but is now amendable."        12/8/2022 per IR documentation will arrange for intervention on Monday If patient agrees if not then patient can be discharged and follow up as outpt with the understanding of the significant  risk of limb ischemia /gangrene and necrosis  12/9/2022 patient  agrees for intervention case d./w dr. Mora who already has patient scheduled for Monday  12/10/2022 c/o groin pain at puncture site palpable mass. ordered warm compresses d/w IR dr. Mora supportive care . continue to c/o persistent leg pain that  patient states been there for days . will increase gabapentin and was started on Dilaudid.  on 12/9/2022 will adjust dose  12/12/2022 pt refusing surgery after arranging with IR see their note dated today for details. patient  longstanding neuropathic pain in left leg since 2013 and I have d/w him in detail that despite revascularization his neuropathic pain may not get better as it has been longstanding in nature and  continue with pain management. I have also explained in detail he is at risk of limb ischemia of his right leg. he was scheduled for revascularization of the right but refused it on today per IR documentation .   I also d/w his sister Mihaela and the patient and he doesn't want it today he wants to wait a few days and see how his pain feels

## 2022-12-12 NOTE — PROGRESS NOTE ADULT - SUBJECTIVE AND OBJECTIVE BOX
Patient is a 67y old  Male who presents with a chief complaint of lower back pain, lower extremity pain (03 Dec 2022 09:38)    MEDICATIONS  (STANDING):  amLODIPine   Tablet 10 milliGRAM(s) Oral daily  aspirin  chewable 81 milliGRAM(s) Oral daily  atorvastatin 40 milliGRAM(s) Oral at bedtime  clopidogrel Tablet 75 milliGRAM(s) Oral daily  enoxaparin Injectable 40 milliGRAM(s) SubCutaneous every 24 hours  gabapentin 600 milliGRAM(s) Oral every 8 hours  influenza  Vaccine (HIGH DOSE) 0.7 milliLiter(s) IntraMuscular once  lactulose Syrup 20 Gram(s) Oral daily  lidocaine   4% Patch 1 Patch Transdermal every 24 hours  lisinopril 10 milliGRAM(s) Oral daily    MEDICATIONS  (PRN):  acetaminophen     Tablet .. 650 milliGRAM(s) Oral every 6 hours PRN Temp greater or equal to 38C (100.4F), Mild Pain (1 - 3)  diphenhydrAMINE Injectable 25 milliGRAM(s) IV Push every 6 hours PRN Rash and/or Itching  HYDROmorphone  Injectable 1 milliGRAM(s) IV Push every 6 hours PRN Severe Pain (7 - 10)  oxycodone    5 mG/acetaminophen 325 mG 1 Tablet(s) Oral every 6 hours PRN Moderate Pain (4 - 6)      Allergies    No Known Allergies    Intolerances    Vital Signs Last 24 Hrs  T(C): 36.8 (12 Dec 2022 11:24), Max: 37.1 (11 Dec 2022 17:57)  T(F): 98.3 (12 Dec 2022 11:24), Max: 98.8 (11 Dec 2022 17:57)  HR: 60 (12 Dec 2022 11:24) (60 - 71)  BP: 117/67 (12 Dec 2022 11:24) (117/67 - 124/74)  BP(mean): --  RR: 17 (12 Dec 2022 11:24) (17 - 18)  SpO2: 99% (12 Dec 2022 11:24) (98% - 99%)    Parameters below as of 11 Dec 2022 22:44  Patient On (Oxygen Delivery Method): room air      PHYSICAL EXAM:  GENERAL: NAD  HEAD:  Atraumatic, Normocephalic  ENMT: No tonsillar erythema, exudates, or enlargement;   NECK: Supple, Normal thyroid  NERVOUS SYSTEM:  Alert & Oriented X3, Good concentration; Motor Strength 5/5 B/L upper and lower extremities; DTRs 2+ intact and symmetric  CHEST/LUNG: CTABL; No rales, rhonchi, wheezing, or rubs  HEART: Regular rate and rhythm; No murmurs, rubs, or gallops  ABDOMEN: Soft, Nontender, Nondistended; Bowel sounds present  EXTREMITIES: right foot , cool left foot has palapable pulse , s/p toenail clipping s   SKIN: No rashes or lesions    LABS:                                                                  13.0   12.44 )-----------( 193      ( 11 Dec 2022 23:55 )             36.3   12-11    136  |  103  |  15  ----------------------------<  174<H>  3.7   |  29  |  0.80    Ca    9.3      11 Dec 2022 23:55  Phos  3.5     12-11  Mg     2.1     12-11    TPro  7.1  /  Alb  3.2<L>  /  TBili  0.4  /  DBili  x   /  AST  28  /  ALT  62  /  AlkPhos  73  12-11          RADIOLOGY & ADDITIONAL TESTS:    < from: CT Angio Lower Extremity w/ IV Cont, Bilateral (12.02.22 @ 20:57) >    Infrarenal abdominal aortic aneurysm, measuring 3.1 cm.    Occluded right internal iliac artery and bilateral femoral arteries.    One-vessel runoff to the right foot.    Three-vessel runoff to the left foot, though with thread-like flow in the   anterior and posterior tibial arteries and the peroneal artery. The left  dorsalis pedis artery remains patent.    < end of copied text >      Imaging Personally Reviewed:  [x ] YES  [ ] NO    Consultant(s) Notes Reviewed:  [ ] YES  [ ] NO    Care Discussed with Consultants/Other Providers [ ] YES  [ ] NO

## 2022-12-12 NOTE — CHART NOTE - NSCHARTNOTEFT_GEN_A_CORE
IR Procedure Cancelled- pt refusing  Assessment/Plan:     - Pt seen at bedside, discussed at length need for angiogram, pt still refusing procedure, wants to see orthopedic surgery regarding spinal surgery before any other IR intervention; sister Mihaela on the phone for the duration of the discussion with pt.   - recommend outpt IR follow up for right lower extremity angiogram  - d/w primary team    Kathy Kirk NP  Available on Teams

## 2022-12-12 NOTE — PROGRESS NOTE ADULT - PROBLEM SELECTOR PLAN 7
a1c at 6.9    patient was seen by nutritionist and diet changed but he doesn't want to be on any diabetic diet and was insistent about changing diet back to what is was before. a1c at 6.9    patient was seen by nutritionist and diet changed but he doesn't want to be on any diabetic diet and was insistent about changing diet back to what is was before.  12/12/2022  I explained to him and his in detail that he is diabetic and would benefit from compliance

## 2022-12-12 NOTE — PROGRESS NOTE ADULT - PROBLEM SELECTOR PLAN 1
possibly from known sciatica and stenosis vs vascular occlusion/claudication   Per my colleague's  documentation ". MRI previously showed L3-L4 stenosis.. normal straight leg on exam but has pain when laying flat   -repeat MRI reviewd and no cord compression seen but does have severe spondylosis   - ortho saw pt and will eventually need decompression surgery at some point but is not emergent. its preferable to get the PVD fixed first as hes high risk for complications with during a major surgery with such poor circulation (hypotention, DVT). fredi angeles with Dr. Day outpatient   -Pain control: increase gabapentin to 400 TID, start flexiril for spasms - 10 TID for 2 days standing, tylenol for PRN. has  been getting motrin daily and was recently on heparin gtt, so would avoid NSAIDs for now. added daily lidocaine patch (says this helps)."    12/9/2022 outpt follow up   increased gabapentin 600 tid possibly from known sciatica and stenosis vs vascular occlusion/claudication   Per my colleague's  documentation ". MRI previously showed L3-L4 stenosis.. normal straight leg on exam but has pain when laying flat   -repeat MRI reviewd and no cord compression seen but does have severe spondylosis   - ortho saw pt and will eventually need decompression surgery at some point but is not emergent. its preferable to get the PVD fixed first as hes high risk for complications with during a major surgery with such poor circulation (hypotention, DVT). fredi angeles with Dr. Day outpatient   -Pain control: increase gabapentin to 400 TID, start flexiril for spasms - 10 TID for 2 days standing, tylenol for PRN. has  been getting motrin daily and was recently on heparin gtt, so would avoid NSAIDs for now. added daily lidocaine patch (says this helps)."    12/9/2022 outpt follow up   increased gabapentin 600 tid  12/12/2022 I have asked ortho to come back and d/w sister and patient once more what their  stance is on his surgery ..

## 2022-12-13 PROCEDURE — 99232 SBSQ HOSP IP/OBS MODERATE 35: CPT

## 2022-12-13 RX ADMIN — HYDROMORPHONE HYDROCHLORIDE 1 MILLIGRAM(S): 2 INJECTION INTRAMUSCULAR; INTRAVENOUS; SUBCUTANEOUS at 22:10

## 2022-12-13 RX ADMIN — SENNA PLUS 2 TABLET(S): 8.6 TABLET ORAL at 21:41

## 2022-12-13 RX ADMIN — LACTULOSE 20 GRAM(S): 10 SOLUTION ORAL at 13:23

## 2022-12-13 RX ADMIN — HYDROMORPHONE HYDROCHLORIDE 1 MILLIGRAM(S): 2 INJECTION INTRAMUSCULAR; INTRAVENOUS; SUBCUTANEOUS at 13:36

## 2022-12-13 RX ADMIN — LIDOCAINE 1 PATCH: 4 CREAM TOPICAL at 07:31

## 2022-12-13 RX ADMIN — GABAPENTIN 600 MILLIGRAM(S): 400 CAPSULE ORAL at 21:40

## 2022-12-13 RX ADMIN — LIDOCAINE 1 PATCH: 4 CREAM TOPICAL at 17:36

## 2022-12-13 RX ADMIN — Medication 5 MILLIGRAM(S): at 18:03

## 2022-12-13 RX ADMIN — ENOXAPARIN SODIUM 40 MILLIGRAM(S): 100 INJECTION SUBCUTANEOUS at 18:03

## 2022-12-13 RX ADMIN — ATORVASTATIN CALCIUM 40 MILLIGRAM(S): 80 TABLET, FILM COATED ORAL at 21:40

## 2022-12-13 RX ADMIN — Medication 5 MILLIGRAM(S): at 06:02

## 2022-12-13 RX ADMIN — LISINOPRIL 10 MILLIGRAM(S): 2.5 TABLET ORAL at 06:02

## 2022-12-13 RX ADMIN — Medication 81 MILLIGRAM(S): at 13:24

## 2022-12-13 RX ADMIN — LIDOCAINE 1 PATCH: 4 CREAM TOPICAL at 06:02

## 2022-12-13 RX ADMIN — CLOPIDOGREL BISULFATE 75 MILLIGRAM(S): 75 TABLET, FILM COATED ORAL at 13:24

## 2022-12-13 RX ADMIN — GABAPENTIN 600 MILLIGRAM(S): 400 CAPSULE ORAL at 06:02

## 2022-12-13 RX ADMIN — GABAPENTIN 600 MILLIGRAM(S): 400 CAPSULE ORAL at 15:01

## 2022-12-13 RX ADMIN — HYDROMORPHONE HYDROCHLORIDE 1 MILLIGRAM(S): 2 INJECTION INTRAMUSCULAR; INTRAVENOUS; SUBCUTANEOUS at 21:47

## 2022-12-13 RX ADMIN — HYDROMORPHONE HYDROCHLORIDE 1 MILLIGRAM(S): 2 INJECTION INTRAMUSCULAR; INTRAVENOUS; SUBCUTANEOUS at 13:51

## 2022-12-13 RX ADMIN — AMLODIPINE BESYLATE 10 MILLIGRAM(S): 2.5 TABLET ORAL at 06:02

## 2022-12-13 NOTE — PROGRESS NOTE ADULT - PROBLEM SELECTOR PROBLEM 2
Lumbar stenosis

## 2022-12-13 NOTE — PROGRESS NOTE ADULT - PROBLEM SELECTOR PROBLEM 6
Strabismic amblyopia, unspecified laterality

## 2022-12-13 NOTE — PROGRESS NOTE ADULT - PROBLEM SELECTOR PLAN 4
c/w amlodipine  12/8/2022 will start low dose acei
c/w amlodipine  12/8/2022 will start low dose acei
c/w amlodipine  holding home chlorthalidone 25 qD for BP in the 110s, restart as needed
c/w amlodipine  12/8/2022 will start low dose acei
c/w amlodipine  holding home chlorthalidone 25 qD for BP in the 110s, restart as needed
c/w amlodipine  12/8/2022 will start low dose acei
c/w amlodipine  holding home chlorthalidone 25 qD for BP in the 110s, restart as needed
c/w amlodipine  holding home chlorthalidone 25 qD for BP in the 110s, restart as needed
c/w amlodipine  12/8/2022 will start low dose acei
c/w amlodipine  12/8/2022 will start low dose acei

## 2022-12-13 NOTE — PROGRESS NOTE ADULT - PROBLEM SELECTOR PLAN 2
MR reviewed from 10/2018: "Moderate central canal stenosis at 23. Severe central canal stenosis at L3-4 and L4-5. L4-5-1 right parasagittal extruded disc fragment extends superiorly "  -likely worsening of his known stenosis  -repeat MRI as above
as above
ass above
Detail Level: Zone
Continue Regimen: Benzaclin gel every morning- apply thin layer to face\\nRetin-a gel every night- apply thin layer to face \\nSolodyn once daily

## 2022-12-13 NOTE — PROGRESS NOTE ADULT - SUBJECTIVE AND OBJECTIVE BOX
Patient is a 67y old  Male who presents with a chief complaint of lower back pain, lower extremity pain (03 Dec 2022 09:38)    MEDICATIONS  (STANDING):  amLODIPine   Tablet 10 milliGRAM(s) Oral daily  aspirin  chewable 81 milliGRAM(s) Oral daily  atorvastatin 40 milliGRAM(s) Oral at bedtime  bisacodyl 5 milliGRAM(s) Oral every 12 hours  clopidogrel Tablet 75 milliGRAM(s) Oral daily  enoxaparin Injectable 40 milliGRAM(s) SubCutaneous every 24 hours  gabapentin 600 milliGRAM(s) Oral every 8 hours  influenza  Vaccine (HIGH DOSE) 0.7 milliLiter(s) IntraMuscular once  lactulose Syrup 20 Gram(s) Oral daily  lidocaine   4% Patch 1 Patch Transdermal every 24 hours  lisinopril 10 milliGRAM(s) Oral daily  senna 2 Tablet(s) Oral at bedtime    MEDICATIONS  (PRN):  acetaminophen     Tablet .. 650 milliGRAM(s) Oral every 6 hours PRN Temp greater or equal to 38C (100.4F), Mild Pain (1 - 3)  bisacodyl Suppository 10 milliGRAM(s) Rectal daily PRN Constipation  diphenhydrAMINE Injectable 25 milliGRAM(s) IV Push every 6 hours PRN Rash and/or Itching  HYDROmorphone  Injectable 1 milliGRAM(s) IV Push every 6 hours PRN Severe Pain (7 - 10)  oxycodone    5 mG/acetaminophen 325 mG 1 Tablet(s) Oral every 6 hours PRN Moderate Pain (4 - 6)      Allergies    No Known Allergies    Intolerances  Vital Signs Last 24 Hrs  T(C): 36.4 (13 Dec 2022 11:34), Max: 36.8 (13 Dec 2022 06:02)  T(F): 97.5 (13 Dec 2022 11:34), Max: 98.2 (13 Dec 2022 06:02)  HR: 70 (13 Dec 2022 11:34) (62 - 76)  BP: 131/64 (13 Dec 2022 11:34) (109/66 - 137/73)  BP(mean): --  RR: 17 (13 Dec 2022 11:34) (17 - 18)  SpO2: 98% (13 Dec 2022 11:34) (94% - 98%)        PHYSICAL EXAM:  GENERAL: NAD  HEAD:  Atraumatic, Normocephalic  ENMT: No tonsillar erythema, exudates, or enlargement;   NECK: Supple, Normal thyroid  NERVOUS SYSTEM:  Alert & Oriented X3, Good concentration; Motor Strength 5/5 B/L upper and lower extremities; DTRs 2+ intact and symmetric  CHEST/LUNG: CTABL; No rales, rhonchi, wheezing, or rubs  HEART: Regular rate and rhythm; No murmurs, rubs, or gallops  ABDOMEN: Soft, Nontender, Nondistended; Bowel sounds present  EXTREMITIES: right foot , cool left foot has palapable pulse , s/p toenail clipping s   SKIN: No rashes or lesions    LABS:                                         RADIOLOGY & ADDITIONAL TESTS:    < from: CT Angio Lower Extremity w/ IV Cont, Bilateral (12.02.22 @ 20:57) >    Infrarenal abdominal aortic aneurysm, measuring 3.1 cm.    Occluded right internal iliac artery and bilateral femoral arteries.    One-vessel runoff to the right foot.    Three-vessel runoff to the left foot, though with thread-like flow in the   anterior and posterior tibial arteries and the peroneal artery. The left  dorsalis pedis artery remains patent.    < end of copied text >      Imaging Personally Reviewed:  [x ] YES  [ ] NO    Consultant(s) Notes Reviewed:  [ ] YES  [ ] NO    Care Discussed with Consultants/Other Providers [ ] YES  [ ] NO

## 2022-12-13 NOTE — PROGRESS NOTE ADULT - PROBLEM SELECTOR PLAN 1
possibly from known sciatica and stenosis vs vascular occlusion/claudication   Per my colleague's  documentation ". MRI previously showed L3-L4 stenosis.. normal straight leg on exam but has pain when laying flat   -repeat MRI reviewd and no cord compression seen but does have severe spondylosis   - ortho saw pt and will eventually need decompression surgery at some point but is not emergent. its preferable to get the PVD fixed first as hes high risk for complications with during a major surgery with such poor circulation (hypotention, DVT). fredi angeles with Dr. Day outpatient   -Pain control: increase gabapentin to 400 TID, start flexiril for spasms - 10 TID for 2 days standing, tylenol for PRN. has  been getting motrin daily and was recently on heparin gtt, so would avoid NSAIDs for now. added daily lidocaine patch (says this helps)."    12/9/2022 outpt follow up   increased gabapentin 600 tid  12/12/2022 I have asked ortho to come back and d/w sister and patient once more what their  stance is on his surgery ..

## 2022-12-13 NOTE — PROGRESS NOTE ADULT - PROBLEM SELECTOR PLAN 6
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
rigth lateral palsy. been getting worse over the last few years   mri brain unremarkable except for possible right eye foreign body   needs optho op f.u
rigth lateral palsy. been getting worse over the last few years   mri brain unremarkable except for possible right eye foreign body   needs optho op f.u  ct orbit reviewed.
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
rigth lateral palsy. been getting worse over the last few years  check mri brain   needs optho op f.u
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
rigth lateral palsy. been getting worse over the last few years   mri brain unremarkable except for possible right eye foreign body   needs optho op f.u  f/u ct orbit

## 2022-12-13 NOTE — PROGRESS NOTE ADULT - PROBLEM SELECTOR PLAN 3
Per my colleague's  documentation "CT on this admission shows "Occlusion of the superficial femoral artery and proximal popliteal artery in the left lower extremity. Flow is reconstituted in the mid popliteal  artery via collaterals."  -f/u Dr. Foss  outpatient   - will start antiplatelets and statin   - refused angiogram at first but is now amendable."        12/8/2022 per IR documentation will arrange for intervention on Monday If patient agrees if not then patient can be discharged and follow up as outpt with the understanding of the significant  risk of limb ischemia /gangrene and necrosis  12/9/2022 patient  agrees for intervention case d./w dr. Mora who already has patient scheduled for Monday  12/10/2022 c/o groin pain at puncture site palpable mass. ordered warm compresses d/w IR dr. Mora supportive care . continue to c/o persistent leg pain that  patient states been there for days . will increase gabapentin and was started on Dilaudid.  on 12/9/2022 will adjust dose  12/12/2022 pt refusing surgery after arranging with IR see their note dated today for details. patient  longstanding neuropathic pain in left leg since 2013 and I have d/w him in detail that despite revascularization his neuropathic pain may not get better as it has been longstanding in nature and  continue with pain management. I have also explained in detail he is at risk of limb ischemia of his right leg. he was scheduled for revascularization of the right but refused it on today per IR documentation .   I also d/w his sister Mihaela and the patient and he doesn't want it today he wants to wait a few days and see how his pain feels

## 2022-12-13 NOTE — PROGRESS NOTE ADULT - PROBLEM SELECTOR PLAN 7
a1c at 6.9    patient was seen by nutritionist and diet changed but he doesn't want to be on any diabetic diet and was insistent about changing diet back to what is was before.  12/12/2022  I explained to him and his in detail that he is diabetic and would benefit from compliance

## 2022-12-13 NOTE — PROGRESS NOTE ADULT - PROBLEM SELECTOR PROBLEM 1
Lower back pain

## 2022-12-13 NOTE — PROGRESS NOTE ADULT - PROBLEM SELECTOR PLAN 5
diet: dash  dvt ppx: heparin subq  dispo: pending PT consult and pain management,
diet: dash  dvt ppx: heparin subq  dispo: pending PT consult and pain management,
diet: dash  dvt ppx: heparin subq  dispo: pending PT consult and pain management, and MRI
diet: dash  dvt ppx: heparin subq  dispo: pending PT consult and pain management,
diet: dash  dvt ppx: heparin subq  dispo: pending PT consult and pain management, and MRI
diet: dash  dvt ppx: heparin subq  dispo: pending PT consult and pain management, and MRI
diet: dash  dvt ppx: heparin subq  dispo: pending PT consult and pain management,
diet: dash  dvt ppx: heparin subq  dispo: pending PT consult and pain management, and MRI
diet: dash  dvt ppx: heparin subq  dispo: pending PT consult and pain management,
diet: dash  dvt ppx: heparin subq  dispo: pending PT consult and pain management,

## 2022-12-13 NOTE — PROGRESS NOTE ADULT - PROBLEM SELECTOR PROBLEM 5
Need for prophylactic measure
normal...
Need for prophylactic measure

## 2022-12-13 NOTE — PROGRESS NOTE ADULT - PROBLEM SELECTOR PROBLEM 3
Peripheral vascular disease

## 2022-12-14 LAB
ANION GAP SERPL CALC-SCNC: 5 MMOL/L — SIGNIFICANT CHANGE UP (ref 5–17)
BUN SERPL-MCNC: 17 MG/DL — SIGNIFICANT CHANGE UP (ref 7–23)
CALCIUM SERPL-MCNC: 9.2 MG/DL — SIGNIFICANT CHANGE UP (ref 8.5–10.1)
CHLORIDE SERPL-SCNC: 103 MMOL/L — SIGNIFICANT CHANGE UP (ref 96–108)
CO2 SERPL-SCNC: 28 MMOL/L — SIGNIFICANT CHANGE UP (ref 22–31)
CREAT SERPL-MCNC: 0.79 MG/DL — SIGNIFICANT CHANGE UP (ref 0.5–1.3)
EGFR: 99 ML/MIN/1.73M2 — SIGNIFICANT CHANGE UP
GLUCOSE SERPL-MCNC: 131 MG/DL — HIGH (ref 70–99)
HCT VFR BLD CALC: 37.1 % — LOW (ref 39–50)
HGB BLD-MCNC: 12.8 G/DL — LOW (ref 13–17)
MAGNESIUM SERPL-MCNC: 2.2 MG/DL — SIGNIFICANT CHANGE UP (ref 1.6–2.6)
MCHC RBC-ENTMCNC: 31.1 PG — SIGNIFICANT CHANGE UP (ref 27–34)
MCHC RBC-ENTMCNC: 34.5 G/DL — SIGNIFICANT CHANGE UP (ref 32–36)
MCV RBC AUTO: 90.3 FL — SIGNIFICANT CHANGE UP (ref 80–100)
NRBC # BLD: 0 /100 WBCS — SIGNIFICANT CHANGE UP (ref 0–0)
PHOSPHATE SERPL-MCNC: 4.4 MG/DL — SIGNIFICANT CHANGE UP (ref 2.5–4.5)
PLATELET # BLD AUTO: 226 K/UL — SIGNIFICANT CHANGE UP (ref 150–400)
POTASSIUM SERPL-MCNC: 4 MMOL/L — SIGNIFICANT CHANGE UP (ref 3.5–5.3)
POTASSIUM SERPL-SCNC: 4 MMOL/L — SIGNIFICANT CHANGE UP (ref 3.5–5.3)
RBC # BLD: 4.11 M/UL — LOW (ref 4.2–5.8)
RBC # FLD: 11.7 % — SIGNIFICANT CHANGE UP (ref 10.3–14.5)
SODIUM SERPL-SCNC: 136 MMOL/L — SIGNIFICANT CHANGE UP (ref 135–145)
WBC # BLD: 10.63 K/UL — HIGH (ref 3.8–10.5)
WBC # FLD AUTO: 10.63 K/UL — HIGH (ref 3.8–10.5)

## 2022-12-14 PROCEDURE — 99232 SBSQ HOSP IP/OBS MODERATE 35: CPT

## 2022-12-14 RX ORDER — HYDROMORPHONE HYDROCHLORIDE 2 MG/ML
0.5 INJECTION INTRAMUSCULAR; INTRAVENOUS; SUBCUTANEOUS EVERY 6 HOURS
Refills: 0 | Status: DISCONTINUED | OUTPATIENT
Start: 2022-12-14 | End: 2022-12-16

## 2022-12-14 RX ADMIN — LIDOCAINE 1 PATCH: 4 CREAM TOPICAL at 17:17

## 2022-12-14 RX ADMIN — SENNA PLUS 2 TABLET(S): 8.6 TABLET ORAL at 23:29

## 2022-12-14 RX ADMIN — CLOPIDOGREL BISULFATE 75 MILLIGRAM(S): 75 TABLET, FILM COATED ORAL at 11:59

## 2022-12-14 RX ADMIN — GABAPENTIN 600 MILLIGRAM(S): 400 CAPSULE ORAL at 23:32

## 2022-12-14 RX ADMIN — LISINOPRIL 10 MILLIGRAM(S): 2.5 TABLET ORAL at 06:40

## 2022-12-14 RX ADMIN — GABAPENTIN 600 MILLIGRAM(S): 400 CAPSULE ORAL at 06:41

## 2022-12-14 RX ADMIN — AMLODIPINE BESYLATE 10 MILLIGRAM(S): 2.5 TABLET ORAL at 06:40

## 2022-12-14 RX ADMIN — ATORVASTATIN CALCIUM 40 MILLIGRAM(S): 80 TABLET, FILM COATED ORAL at 23:31

## 2022-12-14 RX ADMIN — HYDROMORPHONE HYDROCHLORIDE 1 MILLIGRAM(S): 2 INJECTION INTRAMUSCULAR; INTRAVENOUS; SUBCUTANEOUS at 23:45

## 2022-12-14 RX ADMIN — HYDROMORPHONE HYDROCHLORIDE 1 MILLIGRAM(S): 2 INJECTION INTRAMUSCULAR; INTRAVENOUS; SUBCUTANEOUS at 04:30

## 2022-12-14 RX ADMIN — LIDOCAINE 1 PATCH: 4 CREAM TOPICAL at 06:41

## 2022-12-14 RX ADMIN — HYDROMORPHONE HYDROCHLORIDE 1 MILLIGRAM(S): 2 INJECTION INTRAMUSCULAR; INTRAVENOUS; SUBCUTANEOUS at 14:00

## 2022-12-14 RX ADMIN — ENOXAPARIN SODIUM 40 MILLIGRAM(S): 100 INJECTION SUBCUTANEOUS at 17:15

## 2022-12-14 RX ADMIN — HYDROMORPHONE HYDROCHLORIDE 1 MILLIGRAM(S): 2 INJECTION INTRAMUSCULAR; INTRAVENOUS; SUBCUTANEOUS at 23:30

## 2022-12-14 RX ADMIN — LACTULOSE 20 GRAM(S): 10 SOLUTION ORAL at 14:42

## 2022-12-14 RX ADMIN — Medication 5 MILLIGRAM(S): at 06:41

## 2022-12-14 RX ADMIN — HYDROMORPHONE HYDROCHLORIDE 1 MILLIGRAM(S): 2 INJECTION INTRAMUSCULAR; INTRAVENOUS; SUBCUTANEOUS at 13:47

## 2022-12-14 RX ADMIN — Medication 81 MILLIGRAM(S): at 12:00

## 2022-12-14 RX ADMIN — GABAPENTIN 600 MILLIGRAM(S): 400 CAPSULE ORAL at 13:52

## 2022-12-14 RX ADMIN — HYDROMORPHONE HYDROCHLORIDE 1 MILLIGRAM(S): 2 INJECTION INTRAMUSCULAR; INTRAVENOUS; SUBCUTANEOUS at 04:16

## 2022-12-14 RX ADMIN — LIDOCAINE 1 PATCH: 4 CREAM TOPICAL at 08:15

## 2022-12-14 NOTE — PROGRESS NOTE ADULT - SUBJECTIVE AND OBJECTIVE BOX
Patient: RISHABH ROJAS [id number] 67y Male                            Hospitalist Attending Note    Pt reports no  new complaints.  States pain is predominantly in LLE.    Requesting Dilaudid.      ____________________PHYSICAL EXAM:  GENERAL:  NAD alert and oriented to person, place, year.   HEENT: NCAT  CARDIOVASCULAR:  S1, S2  LUNGS: CTAB  ABDOMEN:  soft, (-) tenderness, (-) distension, (+) bowel sounds, (-) guarding, (-) rebound (-) rigidity  EXTREMITIES:  no cyanosis / clubbing / edema.   ____________________     VITALS:  Vital Signs Last 24 Hrs  T(C): 36.8 (14 Dec 2022 11:08), Max: 36.8 (14 Dec 2022 06:38)  T(F): 98.2 (14 Dec 2022 11:08), Max: 98.2 (14 Dec 2022 06:38)  HR: 60 (14 Dec 2022 11:08) (58 - 61)  BP: 111/65 (14 Dec 2022 11:08) (103/67 - 129/75)  BP(mean): --  RR: 19 (14 Dec 2022 11:08) (18 - 19)  SpO2: 97% (14 Dec 2022 11:08) (95% - 98%)     Daily     Daily   CAPILLARY BLOOD GLUCOSE        I&O's Summary    13 Dec 2022 07:01  -  14 Dec 2022 07:00  --------------------------------------------------------  IN: 470 mL / OUT: 1100 mL / NET: -630 mL        HISTORY:  PAST MEDICAL & SURGICAL HISTORY:  Prediabetes      Lumbar stenosis      Hearing difficulty, unspecified laterality      No significant past surgical history      Allergies    ampicillin (Unknown)  pork (Vomiting)  shellfish (Rash)    Intolerances       LABS:                        12.8   10.63 )-----------( 226      ( 14 Dec 2022 06:15 )             37.1     12-14    136  |  103  |  17  ----------------------------<  131<H>  4.0   |  28  |  0.79    Ca    9.2      14 Dec 2022 06:15  Phos  4.4     12-14  Mg     2.2     12-14                    MEDICATIONS:  MEDICATIONS  (STANDING):  amLODIPine   Tablet 10 milliGRAM(s) Oral daily  aspirin  chewable 81 milliGRAM(s) Oral daily  atorvastatin 40 milliGRAM(s) Oral at bedtime  bisacodyl 5 milliGRAM(s) Oral every 12 hours  clopidogrel Tablet 75 milliGRAM(s) Oral daily  enoxaparin Injectable 40 milliGRAM(s) SubCutaneous every 24 hours  gabapentin 600 milliGRAM(s) Oral every 8 hours  influenza  Vaccine (HIGH DOSE) 0.7 milliLiter(s) IntraMuscular once  lidocaine   4% Patch 1 Patch Transdermal every 24 hours  lisinopril 10 milliGRAM(s) Oral daily  senna 2 Tablet(s) Oral at bedtime    MEDICATIONS  (PRN):  bisacodyl Suppository 10 milliGRAM(s) Rectal daily PRN Constipation  diphenhydrAMINE Injectable 25 milliGRAM(s) IV Push every 6 hours PRN Rash and/or Itching  HYDROmorphone  Injectable 1 milliGRAM(s) IV Push every 6 hours PRN Severe Pain (7 - 10)

## 2022-12-14 NOTE — CHART NOTE - NSCHARTNOTEFT_GEN_A_CORE
Pt fit with his right custom afo with foam liner for foot drop.    Pt told to have family member to bring sneakers to the hospital so he can be seen by PT  Fits well. Pt instructed to don and doff  Follow up after discharge.  Pt given office contact information if problems arise.  Follow up if needed      Marcell LOPEZ  [phone]  [facility]

## 2022-12-15 PROCEDURE — 99233 SBSQ HOSP IP/OBS HIGH 50: CPT

## 2022-12-15 RX ADMIN — LIDOCAINE 1 PATCH: 4 CREAM TOPICAL at 17:40

## 2022-12-15 RX ADMIN — LIDOCAINE 1 PATCH: 4 CREAM TOPICAL at 06:32

## 2022-12-15 RX ADMIN — GABAPENTIN 600 MILLIGRAM(S): 400 CAPSULE ORAL at 15:37

## 2022-12-15 RX ADMIN — GABAPENTIN 600 MILLIGRAM(S): 400 CAPSULE ORAL at 06:31

## 2022-12-15 RX ADMIN — Medication 5 MILLIGRAM(S): at 06:31

## 2022-12-15 RX ADMIN — Medication 81 MILLIGRAM(S): at 15:39

## 2022-12-15 RX ADMIN — CLOPIDOGREL BISULFATE 75 MILLIGRAM(S): 75 TABLET, FILM COATED ORAL at 15:37

## 2022-12-15 RX ADMIN — ATORVASTATIN CALCIUM 40 MILLIGRAM(S): 80 TABLET, FILM COATED ORAL at 22:48

## 2022-12-15 RX ADMIN — GABAPENTIN 600 MILLIGRAM(S): 400 CAPSULE ORAL at 22:48

## 2022-12-15 RX ADMIN — HYDROMORPHONE HYDROCHLORIDE 1 MILLIGRAM(S): 2 INJECTION INTRAMUSCULAR; INTRAVENOUS; SUBCUTANEOUS at 06:31

## 2022-12-15 RX ADMIN — LIDOCAINE 1 PATCH: 4 CREAM TOPICAL at 08:40

## 2022-12-15 NOTE — PROGRESS NOTE ADULT - SUBJECTIVE AND OBJECTIVE BOX
Patient: RISHABH ROJAS [id number] 67y Male                            Hospitalist Attending Note    Pt reports no  new complaints.  States pain is predominantly in LLE, no new change.  Sister Stella present via phone     ____________________PHYSICAL EXAM:  GENERAL:  NAD alert and oriented to person, place, year.   HEENT: NCAT  CARDIOVASCULAR:  S1, S2  LUNGS: CTAB  ABDOMEN:  soft, (-) tenderness, (-) distension, (+) bowel sounds, (-) guarding, (-) rebound (-) rigidity  EXTREMITIES:  no cyanosis / clubbing / edema.   ____________________    VITALS:  Vital Signs Last 24 Hrs  T(C): 36.8 (15 Dec 2022 12:20), Max: 37 (14 Dec 2022 23:08)  T(F): 98.2 (15 Dec 2022 12:20), Max: 98.6 (14 Dec 2022 23:08)  HR: 96 (15 Dec 2022 12:20) (54 - 96)  BP: 120/81 (15 Dec 2022 12:20) (104/67 - 123/76)  BP(mean): --  RR: 18 (15 Dec 2022 12:20) (18 - 18)  SpO2: 95% (15 Dec 2022 12:20) (95% - 98%)    Parameters below as of 15 Dec 2022 12:20  Patient On (Oxygen Delivery Method): room air     Daily     Daily   CAPILLARY BLOOD GLUCOSE        I&O's Summary    14 Dec 2022 07:01  -  15 Dec 2022 07:00  --------------------------------------------------------  IN: 300 mL / OUT: 1000 mL / NET: -700 mL        LABS:                        12.8   10.63 )-----------( 226      ( 14 Dec 2022 06:15 )             37.1     12-14    136  |  103  |  17  ----------------------------<  131<H>  4.0   |  28  |  0.79    Ca    9.2      14 Dec 2022 06:15  Phos  4.4     12-14  Mg     2.2     12-14                    MEDICATIONS:  amLODIPine   Tablet 10 milliGRAM(s) Oral daily  aspirin  chewable 81 milliGRAM(s) Oral daily  atorvastatin 40 milliGRAM(s) Oral at bedtime  bisacodyl 5 milliGRAM(s) Oral every 12 hours  bisacodyl Suppository 10 milliGRAM(s) Rectal daily PRN  clopidogrel Tablet 75 milliGRAM(s) Oral daily  diphenhydrAMINE Injectable 25 milliGRAM(s) IV Push every 6 hours PRN  enoxaparin Injectable 40 milliGRAM(s) SubCutaneous every 24 hours  gabapentin 600 milliGRAM(s) Oral every 8 hours  HYDROmorphone  Injectable 0.5 milliGRAM(s) IV Push every 6 hours PRN  influenza  Vaccine (HIGH DOSE) 0.7 milliLiter(s) IntraMuscular once  lidocaine   4% Patch 1 Patch Transdermal every 24 hours  lisinopril 10 milliGRAM(s) Oral daily  oxycodone    5 mG/acetaminophen 325 mG 1 Tablet(s) Oral every 6 hours PRN  oxycodone    5 mG/acetaminophen 325 mG 2 Tablet(s) Oral every 6 hours PRN  senna 2 Tablet(s) Oral at bedtime

## 2022-12-16 PROCEDURE — 99239 HOSP IP/OBS DSCHRG MGMT >30: CPT

## 2022-12-16 RX ORDER — LISINOPRIL 2.5 MG/1
1 TABLET ORAL
Qty: 30 | Refills: 0
Start: 2022-12-16

## 2022-12-16 RX ORDER — GABAPENTIN 400 MG/1
1 CAPSULE ORAL
Qty: 90 | Refills: 0
Start: 2022-12-16 | End: 2023-01-14

## 2022-12-16 RX ORDER — CLOPIDOGREL BISULFATE 75 MG/1
1 TABLET, FILM COATED ORAL
Qty: 30 | Refills: 0
Start: 2022-12-16

## 2022-12-16 RX ORDER — OXYCODONE AND ACETAMINOPHEN 5; 325 MG/1; MG/1
1 TABLET ORAL
Qty: 20 | Refills: 0
Start: 2022-12-16

## 2022-12-16 RX ORDER — ASPIRIN/CALCIUM CARB/MAGNESIUM 324 MG
1 TABLET ORAL
Qty: 0 | Refills: 0 | DISCHARGE
Start: 2022-12-16

## 2022-12-16 RX ORDER — ACETAMINOPHEN 500 MG
2 TABLET ORAL
Qty: 0 | Refills: 0 | DISCHARGE

## 2022-12-16 RX ORDER — CHLORTHALIDONE 50 MG
1 TABLET ORAL
Qty: 0 | Refills: 0 | DISCHARGE

## 2022-12-16 RX ORDER — HYDROMORPHONE HYDROCHLORIDE 2 MG/ML
0.5 INJECTION INTRAMUSCULAR; INTRAVENOUS; SUBCUTANEOUS
Refills: 0 | Status: DISCONTINUED | OUTPATIENT
Start: 2022-12-16 | End: 2022-12-18

## 2022-12-16 RX ORDER — OXYCODONE AND ACETAMINOPHEN 5; 325 MG/1; MG/1
1 TABLET ORAL
Qty: 12 | Refills: 0
Start: 2022-12-16

## 2022-12-16 RX ORDER — ATORVASTATIN CALCIUM 80 MG/1
1 TABLET, FILM COATED ORAL
Qty: 30 | Refills: 0
Start: 2022-12-16

## 2022-12-16 RX ADMIN — ENOXAPARIN SODIUM 40 MILLIGRAM(S): 100 INJECTION SUBCUTANEOUS at 19:17

## 2022-12-16 RX ADMIN — AMLODIPINE BESYLATE 10 MILLIGRAM(S): 2.5 TABLET ORAL at 06:08

## 2022-12-16 RX ADMIN — Medication 5 MILLIGRAM(S): at 19:17

## 2022-12-16 RX ADMIN — GABAPENTIN 600 MILLIGRAM(S): 400 CAPSULE ORAL at 23:13

## 2022-12-16 RX ADMIN — GABAPENTIN 600 MILLIGRAM(S): 400 CAPSULE ORAL at 14:09

## 2022-12-16 RX ADMIN — LISINOPRIL 10 MILLIGRAM(S): 2.5 TABLET ORAL at 06:08

## 2022-12-16 RX ADMIN — LIDOCAINE 1 PATCH: 4 CREAM TOPICAL at 06:05

## 2022-12-16 RX ADMIN — Medication 81 MILLIGRAM(S): at 16:28

## 2022-12-16 RX ADMIN — GABAPENTIN 600 MILLIGRAM(S): 400 CAPSULE ORAL at 06:06

## 2022-12-16 RX ADMIN — LIDOCAINE 1 PATCH: 4 CREAM TOPICAL at 19:15

## 2022-12-16 RX ADMIN — LIDOCAINE 1 PATCH: 4 CREAM TOPICAL at 07:00

## 2022-12-16 RX ADMIN — CLOPIDOGREL BISULFATE 75 MILLIGRAM(S): 75 TABLET, FILM COATED ORAL at 14:09

## 2022-12-16 NOTE — DISCHARGE NOTE PROVIDER - NSDCFUADDAPPT_GEN_ALL_CORE_FT
APPTS ARE READY TO BE MADE: [X] YES    Best Family or Patient Contact (if needed):    Additional Information about above appointments (if needed):    1:   2:   3:     Other comments or requests:    APPTS ARE READY TO BE MADE: [X] YES    Best Family or Patient Contact (if needed):    Additional Information about above appointments (if needed):    1: During your hospitalization, you had abnormal findings on radiologic / laboratory studies.  Please see your primary doctor for followup of these findings to ensure that they have resolved.  You may require further evaluation to exclude certain serious conditions, and / or treatment.   2:   3:     Other comments or requests:       < from: CT Orbit No Cont (12.06.22 @ 10:46) >      IMPRESSION: Old right medial orbital wall fracture. Tiny focus of   metallic foreign body in the soft tissues overlying the left lateral   orbital wall. No intraorbital foreign bodies..    < end of copied text >    < from: CT Angio Lower Extremity w/ IV Cont, Bilateral (12.02.22 @ 20:57) >      Infrarenal abdominal aortic aneurysm, measuring 3.1 cm.    Occluded right internal iliac artery and bilateral femoral arteries.    One-vessel runoff to the right foot.    Three-vessel runoff to the left foot, though with thread-like flow in the   anterior and posterior tibial arteries and the peroneal artery. The left  dorsalis pedis artery remains patent.    < end of copied text >     APPTS ARE READY TO BE MADE: [X] YES    Best Family or Patient Contact (if needed):    Additional Information about above appointments (if needed):    1: During your hospitalization, you had abnormal findings on radiologic / laboratory studies.  Please see your primary doctor for followup of these findings to ensure that they have resolved.  You may require further evaluation to exclude certain serious conditions, and / or treatment.   2:   3:     Other comments or requests:       < from: CT Orbit No Cont (12.06.22 @ 10:46) >      IMPRESSION: Old right medial orbital wall fracture. Tiny focus of   metallic foreign body in the soft tissues overlying the left lateral   orbital wall. No intraorbital foreign bodies..    < end of copied text >    < from: CT Angio Lower Extremity w/ IV Cont, Bilateral (12.02.22 @ 20:57) >      Infrarenal abdominal aortic aneurysm, measuring 3.1 cm.    Occluded right internal iliac artery and bilateral femoral arteries.    One-vessel runoff to the right foot.    Three-vessel runoff to the left foot, though with thread-like flow in the   anterior and posterior tibial arteries and the peroneal artery. The left  dorsalis pedis artery remains patent.    < end of copied text >      PLEASE ALSO MAKE APPOINTMENT WITH PAIN MANAGEMENT PHYSICIAN      APPTS ARE READY TO BE MADE: [X] YES    Best Family or Patient Contact (if needed):    Additional Information about above appointments (if needed):    1: During your hospitalization, you had abnormal findings on radiologic / laboratory studies.  Please see your primary doctor for followup of these findings to ensure that they have resolved.  You may require further evaluation to exclude certain serious conditions, and / or treatment.   2:   3:     Other comments or requests:       < from: CT Orbit No Cont (12.06.22 @ 10:46) >      IMPRESSION: Old right medial orbital wall fracture. Tiny focus of   metallic foreign body in the soft tissues overlying the left lateral   orbital wall. No intraorbital foreign bodies..    < end of copied text >    < from: CT Angio Lower Extremity w/ IV Cont, Bilateral (12.02.22 @ 20:57) >      Infrarenal abdominal aortic aneurysm, measuring 3.1 cm.    Occluded right internal iliac artery and bilateral femoral arteries.    One-vessel runoff to the right foot.    Three-vessel runoff to the left foot, though with thread-like flow in the   anterior and posterior tibial arteries and the peroneal artery. The left  dorsalis pedis artery remains patent.    < end of copied text >      PLEASE ALSO MAKE APPOINTMENT WITH PAIN MANAGEMENT PHYSICIAN     Patient was provided with follow up request details and was advised to call to schedule follow up within specified time frame.

## 2022-12-16 NOTE — PROGRESS NOTE ADULT - SUBJECTIVE AND OBJECTIVE BOX
Patient: RISHABH ROJAS [id number] 67y Male                            Hospitalist Attending Note    Pt seem ambulating to hallway without difficulty.  Offers no new  complaints.    ____________________PHYSICAL EXAM:  GENERAL:  NAD Alert and Oriented x 3   HEENT: NCAT  CARDIOVASCULAR:  S1, S2  LUNGS: CTAB  ABDOMEN:  soft, (-) tenderness, (-) distension, (+) bowel sounds, (-) guarding, (-) rebound (-) rigidity  EXTREMITIES:  no cyanosis / clubbing / edema.   ____________________    VITALS:  Vital Signs Last 24 Hrs  T(C): 37.2 (16 Dec 2022 11:10), Max: 37.2 (16 Dec 2022 11:10)  T(F): 99 (16 Dec 2022 11:10), Max: 99 (16 Dec 2022 11:10)  HR: 71 (16 Dec 2022 11:10) (56 - 71)  BP: 135/71 (16 Dec 2022 11:10) (122/70 - 135/71)  BP(mean): --  RR: 17 (16 Dec 2022 11:10) (17 - 18)  SpO2: 97% (16 Dec 2022 11:10) (97% - 99%)    Parameters below as of 16 Dec 2022 06:25  Patient On (Oxygen Delivery Method): room air     Daily     Daily   CAPILLARY BLOOD GLUCOSE        I&O's Summary    15 Dec 2022 07:01  -  16 Dec 2022 07:00  --------------------------------------------------------  IN: 240 mL / OUT: 500 mL / NET: -260 mL        LABS:                        MEDICATIONS:  amLODIPine   Tablet 10 milliGRAM(s) Oral daily  aspirin  chewable 81 milliGRAM(s) Oral daily  atorvastatin 40 milliGRAM(s) Oral at bedtime  bisacodyl 5 milliGRAM(s) Oral every 12 hours  bisacodyl Suppository 10 milliGRAM(s) Rectal daily PRN  clopidogrel Tablet 75 milliGRAM(s) Oral daily  diphenhydrAMINE Injectable 25 milliGRAM(s) IV Push every 6 hours PRN  enoxaparin Injectable 40 milliGRAM(s) SubCutaneous every 24 hours  gabapentin 600 milliGRAM(s) Oral every 8 hours  HYDROmorphone  Injectable 0.5 milliGRAM(s) IV Push two times a day PRN  influenza  Vaccine (HIGH DOSE) 0.7 milliLiter(s) IntraMuscular once  lidocaine   4% Patch 1 Patch Transdermal every 24 hours  lisinopril 10 milliGRAM(s) Oral daily  oxycodone    5 mG/acetaminophen 325 mG 1 Tablet(s) Oral every 6 hours PRN  oxycodone    5 mG/acetaminophen 325 mG 2 Tablet(s) Oral every 6 hours PRN  senna 2 Tablet(s) Oral at bedtime

## 2022-12-16 NOTE — DISCHARGE NOTE PROVIDER - HOSPITAL COURSE
67yom w/ hx of HTN, HLD, neuropathy, lumbar stenosis, who presents with acute on chronic back and lower extremity pain, admitted for pain control. Also found to have occlusion of femoral artery, from peripheral vascular disease. MR Rigoberto: severe stenosis L3-4, moderate stenosis L4-5, severe recess and foraminal impingement L5-S1.  Seen by Orthopedic surgery 12/4, recommended no acute surgery.  CT LE 12/2 showed Infrarenal abdominal aortic aneurysm, measuring 3.1 cm.  Occluded right internal iliac artery and bilateral femoral arteries.  One-vessel runoff to the right foot. Three-vessel runoff to the left foot, though with thread-like flow in the anterior and posterior tibial arteries and the peroneal artery. The left dorsalis pedis artery remains patent. Seen by IR, s/p successful left lower extremity arterial revascularization with stenting on 12/7.   He refused RLE angiogram on 12/12.        # Low Back Pain / Lumbar Stenosis - MR showing severe stenosis L3-4, moderate stenosis L4-5, severe recess and foraminal impingement L5-S1.   Seen by Orthopedic surgery 12/4, recommended no acute surgery.  Pain control with Tylenol, limited Percocet on discharge.  He is ambulating independently into hallway with cane.    # History of Cocaine abuse - pt and sister both expressed concerns of opiate dependence.   Pt and sister in agreement with outpatient regimen of Tylenol and limited Percocet.    # PVD - Seen by IR, s/p successful left lower extremity arterial revascularization with stenting on 12/7.    He refused RLE angiogram on 12/12.  Per IR, outpatient f/u for RLE angiogram if pt consents.   Continue antiplatelets and statin   # Peripheral Neuropathy - continue Neurontin.  # Essential HTN - Monitor BP.  Continue antihypertensives.  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  Outpatient opthalmology evaluation.  # Diabetes Type II - monitor fingersticks.  Insulin coverage for hyperglycemia.  # Inpatient DVT Prophylaxis - Lovenox subcut    Stable for d/c home.   Disposition: Stable for discharge.  Outpatient followup discussed.  Total time spent on discharge is  35  minutes.

## 2022-12-16 NOTE — DISCHARGE NOTE NURSING/CASE MANAGEMENT/SOCIAL WORK - NSDCFUADDAPPT_GEN_ALL_CORE_FT
APPTS ARE READY TO BE MADE: [X] YES    Best Family or Patient Contact (if needed):    Additional Information about above appointments (if needed):    1: During your hospitalization, you had abnormal findings on radiologic / laboratory studies.  Please see your primary doctor for followup of these findings to ensure that they have resolved.  You may require further evaluation to exclude certain serious conditions, and / or treatment.   2:   3:     Other comments or requests:       < from: CT Orbit No Cont (12.06.22 @ 10:46) >      IMPRESSION: Old right medial orbital wall fracture. Tiny focus of   metallic foreign body in the soft tissues overlying the left lateral   orbital wall. No intraorbital foreign bodies..    < end of copied text >    < from: CT Angio Lower Extremity w/ IV Cont, Bilateral (12.02.22 @ 20:57) >      Infrarenal abdominal aortic aneurysm, measuring 3.1 cm.    Occluded right internal iliac artery and bilateral femoral arteries.    One-vessel runoff to the right foot.    Three-vessel runoff to the left foot, though with thread-like flow in the   anterior and posterior tibial arteries and the peroneal artery. The left  dorsalis pedis artery remains patent.    < end of copied text >

## 2022-12-16 NOTE — DISCHARGE NOTE NURSING/CASE MANAGEMENT/SOCIAL WORK - NSDCPEFALRISK_GEN_ALL_CORE
For information on Fall & Injury Prevention, visit: [email or web address] OR  [email or web address] OR  https://www.cdc.gov/steadi/patient.html

## 2022-12-16 NOTE — DISCHARGE NOTE PROVIDER - NSDCFUADDINST_GEN_ALL_CORE_FT
It is important to see your primary physician as well as any specialty physicians within the next week to perform a comprehensive medical review.  Call their offices for an appointment as soon as you leave the hospital.  You will also need to see them for renewal of your medications.  If have any difficulty following with a physician, contact the [facility] [phone] or via [email or web address].   To obtain your results, you can access the [employer] Patient Portal at [email or web address].  Your medical issues appear to be stable at this time, but if your symptoms recur or worsen, contact your physicians and/or return to the hospital if necessary.  If you encounter any issues or questions with your medication, call your physicians before stopping the medication.  Do not drive.  Limit your diet to 2 grams of sodium daily.

## 2022-12-16 NOTE — DISCHARGE NOTE PROVIDER - CARE PROVIDER_API CALL
Interventional Radiology at [facility],   Phone: [phone]  Fax: (   )    -  Follow Up Time:    Interventional Radiology at [facility],   Phone: [phone]  Fax: (   )    -  Follow Up Time:     PMD,   Phone: (   )    -  Fax: (   )    -  Follow Up Time:

## 2022-12-16 NOTE — DISCHARGE NOTE NURSING/CASE MANAGEMENT/SOCIAL WORK - PATIENT PORTAL LINK FT
You can access the FollowMyHealth Patient Portal offered by [facility] by registering at the following website: [email or web address]. By joining [facility]’s FollowMyHealth portal, you will also be able to view your health information using other applications (apps) compatible with our system.

## 2022-12-16 NOTE — DISCHARGE NOTE PROVIDER - NSDCCPCAREPLAN_GEN_ALL_CORE_FT
PRINCIPAL DISCHARGE DIAGNOSIS  Diagnosis: Lumbar stenosis  Assessment and Plan of Treatment:       SECONDARY DISCHARGE DIAGNOSES  Diagnosis: Peripheral vascular disease  Assessment and Plan of Treatment:     Diagnosis: Aortic aneurysm  Assessment and Plan of Treatment:     Diagnosis: Foreign body in eye region  Assessment and Plan of Treatment:

## 2022-12-16 NOTE — DISCHARGE NOTE PROVIDER - PROVIDER TOKENS
FREE:[LAST:[Interventional Radiology at [facility]],PHONE:[phone]],FAX:[(   )    -]] FREE:[LAST:[Interventional Radiology at [facility]],PHONE:[phone]],FAX:[(   )    -]],FREE:[LAST:[PMD],PHONE:[(   )    -],FAX:[(   )    -]]

## 2022-12-16 NOTE — DISCHARGE NOTE PROVIDER - NSDCMRMEDTOKEN_GEN_ALL_CORE_FT
amLODIPine 10 mg oral tablet: 1 tab(s) orally once a day  aspirin 81 mg oral tablet, chewable: 1 tab(s) orally once a day  lidocaine 5% topical film: Apply topically to affected area once a day  polyethylene glycol 3350 oral powder for reconstitution: 17 gram(s) orally once a day  Tylenol 325 mg oral tablet: 2 tab(s) orally every 6 hours, As Needed mild - moderate pain   amLODIPine 10 mg oral tablet: 1 tab(s) orally once a day  aspirin 81 mg oral tablet, chewable: 1 tab(s) orally once a day  atorvastatin 40 mg oral tablet: 1 tab(s) orally once a day (at bedtime)  clopidogrel 75 mg oral tablet: 1 tab(s) orally once a day  gabapentin 300 mg oral capsule: 1 cap(s) orally 3 times a day  lidocaine 5% topical film: Apply topically to affected area once a day  lisinopril 10 mg oral tablet: 1 tab(s) orally once a day  Percocet 5 mg-325 mg oral tablet: 1 tab(s) orally every 6 hours as needed severe pain MDD:4  polyethylene glycol 3350 oral powder for reconstitution: 17 gram(s) orally once a day  Tylenol 325 mg oral tablet: 2 tab(s) orally every 6 hours, As Needed mild - moderate pain

## 2022-12-17 PROCEDURE — 99232 SBSQ HOSP IP/OBS MODERATE 35: CPT

## 2022-12-17 RX ADMIN — LIDOCAINE 1 PATCH: 4 CREAM TOPICAL at 08:00

## 2022-12-17 RX ADMIN — SENNA PLUS 2 TABLET(S): 8.6 TABLET ORAL at 21:07

## 2022-12-17 RX ADMIN — LIDOCAINE 1 PATCH: 4 CREAM TOPICAL at 06:38

## 2022-12-17 RX ADMIN — HYDROMORPHONE HYDROCHLORIDE 0.5 MILLIGRAM(S): 2 INJECTION INTRAMUSCULAR; INTRAVENOUS; SUBCUTANEOUS at 08:42

## 2022-12-17 RX ADMIN — CLOPIDOGREL BISULFATE 75 MILLIGRAM(S): 75 TABLET, FILM COATED ORAL at 12:25

## 2022-12-17 RX ADMIN — Medication 81 MILLIGRAM(S): at 12:29

## 2022-12-17 RX ADMIN — ENOXAPARIN SODIUM 40 MILLIGRAM(S): 100 INJECTION SUBCUTANEOUS at 18:00

## 2022-12-17 RX ADMIN — GABAPENTIN 600 MILLIGRAM(S): 400 CAPSULE ORAL at 06:37

## 2022-12-17 RX ADMIN — ATORVASTATIN CALCIUM 40 MILLIGRAM(S): 80 TABLET, FILM COATED ORAL at 21:07

## 2022-12-17 RX ADMIN — LISINOPRIL 10 MILLIGRAM(S): 2.5 TABLET ORAL at 06:37

## 2022-12-17 RX ADMIN — Medication 5 MILLIGRAM(S): at 06:37

## 2022-12-17 RX ADMIN — LIDOCAINE 1 PATCH: 4 CREAM TOPICAL at 18:00

## 2022-12-17 RX ADMIN — HYDROMORPHONE HYDROCHLORIDE 0.5 MILLIGRAM(S): 2 INJECTION INTRAMUSCULAR; INTRAVENOUS; SUBCUTANEOUS at 08:55

## 2022-12-17 RX ADMIN — GABAPENTIN 600 MILLIGRAM(S): 400 CAPSULE ORAL at 14:59

## 2022-12-17 RX ADMIN — AMLODIPINE BESYLATE 10 MILLIGRAM(S): 2.5 TABLET ORAL at 06:37

## 2022-12-17 RX ADMIN — HYDROMORPHONE HYDROCHLORIDE 0.5 MILLIGRAM(S): 2 INJECTION INTRAMUSCULAR; INTRAVENOUS; SUBCUTANEOUS at 21:07

## 2022-12-17 RX ADMIN — Medication 5 MILLIGRAM(S): at 18:09

## 2022-12-17 RX ADMIN — HYDROMORPHONE HYDROCHLORIDE 0.5 MILLIGRAM(S): 2 INJECTION INTRAMUSCULAR; INTRAVENOUS; SUBCUTANEOUS at 21:25

## 2022-12-17 RX ADMIN — GABAPENTIN 600 MILLIGRAM(S): 400 CAPSULE ORAL at 21:07

## 2022-12-17 NOTE — PROGRESS NOTE ADULT - SUBJECTIVE AND OBJECTIVE BOX
Patient is a 67y old  Male who presents with a chief complaint of lower back pain, lower extremity pain (16 Dec 2022 13:15)      INTERVAL HPI/OVERNIGHT EVENTS: pt didn't leave last night due to insurance problems    MEDICATIONS  (STANDING):  amLODIPine   Tablet 10 milliGRAM(s) Oral daily  aspirin  chewable 81 milliGRAM(s) Oral daily  atorvastatin 40 milliGRAM(s) Oral at bedtime  bisacodyl 5 milliGRAM(s) Oral every 12 hours  clopidogrel Tablet 75 milliGRAM(s) Oral daily  enoxaparin Injectable 40 milliGRAM(s) SubCutaneous every 24 hours  gabapentin 600 milliGRAM(s) Oral every 8 hours  influenza  Vaccine (HIGH DOSE) 0.7 milliLiter(s) IntraMuscular once  lidocaine   4% Patch 1 Patch Transdermal every 24 hours  lisinopril 10 milliGRAM(s) Oral daily  senna 2 Tablet(s) Oral at bedtime    MEDICATIONS  (PRN):  bisacodyl Suppository 10 milliGRAM(s) Rectal daily PRN Constipation  diphenhydrAMINE Injectable 25 milliGRAM(s) IV Push every 6 hours PRN Rash and/or Itching  HYDROmorphone  Injectable 0.5 milliGRAM(s) IV Push two times a day PRN Breakthrough pain  oxycodone    5 mG/acetaminophen 325 mG 1 Tablet(s) Oral every 6 hours PRN mild - moderate pain  oxycodone    5 mG/acetaminophen 325 mG 2 Tablet(s) Oral every 6 hours PRN Severe Pain (7 - 10)      Allergies    ampicillin (Unknown)  pork (Vomiting)  shellfish (Rash)    Intolerances        REVIEW OF SYSTEMS:  CONSTITUTIONAL: No fever, weight loss, or fatigue  EYES: No eye pain, visual disturbances, or discharge  ENMT:  No difficulty hearing, tinnitus, vertigo; No sinus or throat pain  NECK: No pain or stiffness  BREASTS: No pain, masses, or nipple discharge  RESPIRATORY: No cough, wheezing, chills or hemoptysis; No shortness of breath  CARDIOVASCULAR: No chest pain, palpitations, dizziness, or leg swelling  GASTROINTESTINAL: No abdominal or epigastric pain. No nausea, vomiting, or hematemesis; No diarrhea or constipation. No melena or hematochezia.  GENITOURINARY: No dysuria, frequency, hematuria, or incontinence  NEUROLOGICAL: No headaches, memory loss, loss of strength, numbness, or tremors  SKIN: No itching, burning, rashes, or lesions   LYMPH NODES: No enlarged glands  ENDOCRINE: No heat or cold intolerance; No hair loss  MUSCULOSKELETAL: No joint pain or swelling; No muscle, back, or extremity pain  PSYCHIATRIC: No depression, anxiety, mood swings, or difficulty sleeping  HEME/LYMPH: No easy bruising, or bleeding gums  ALLERGY AND IMMUNOLOGIC: No hives or eczema    Vital Signs Last 24 Hrs  T(C): 36.9 (17 Dec 2022 11:03), Max: 36.9 (17 Dec 2022 11:03)  T(F): 98.5 (17 Dec 2022 11:03), Max: 98.5 (17 Dec 2022 11:03)  HR: 57 (17 Dec 2022 11:03) (54 - 61)  BP: 119/69 (17 Dec 2022 11:03) (116/72 - 139/62)  BP(mean): --  RR: 16 (17 Dec 2022 11:03) (16 - 18)  SpO2: 96% (17 Dec 2022 11:03) (96% - 98%)    Parameters below as of 17 Dec 2022 11:03  Patient On (Oxygen Delivery Method): room air        PHYSICAL EXAM:  GENERAL: NAD, well-groomed, well-developed  HEAD:  Atraumatic, Normocephalic  EYES: EOMI, PERRLA, conjunctiva and sclera clear  ENMT: No tonsillar erythema, exudates, or enlargement; Moist mucous membranes, Good dentition, No lesions  NECK: Supple, No JVD, Normal thyroid  NERVOUS SYSTEM:  Alert & Oriented X3, Good concentration; Motor Strength 5/5 B/L upper and lower extremities; DTRs 2+ intact and symmetric  CHEST/LUNG: Clear to percussion bilaterally; No rales, rhonchi, wheezing, or rubs  HEART: Regular rate and rhythm; No murmurs, rubs, or gallops  ABDOMEN: Soft, Nontender, Nondistended; Bowel sounds present  EXTREMITIES:  2+ Peripheral Pulses, No clubbing, cyanosis, or edema  LYMPH: No lymphadenopathy noted  SKIN: No rashes or lesions    LABS:              CAPILLARY BLOOD GLUCOSE          RADIOLOGY & ADDITIONAL TESTS:    Imaging Personally Reviewed:  [ ] YES  [ ] NO    Consultant(s) Notes Reviewed:  [ ] YES  [ ] NO    Care Discussed with Consultants/Other Providers [ ] YES  [ ] NO

## 2022-12-18 PROCEDURE — 99232 SBSQ HOSP IP/OBS MODERATE 35: CPT

## 2022-12-18 RX ORDER — HYDROMORPHONE HYDROCHLORIDE 2 MG/ML
0.5 INJECTION INTRAMUSCULAR; INTRAVENOUS; SUBCUTANEOUS
Refills: 0 | Status: DISCONTINUED | OUTPATIENT
Start: 2022-12-18 | End: 2022-12-19

## 2022-12-18 RX ORDER — HYDROMORPHONE HYDROCHLORIDE 2 MG/ML
0.5 INJECTION INTRAMUSCULAR; INTRAVENOUS; SUBCUTANEOUS
Refills: 0 | Status: DISCONTINUED | OUTPATIENT
Start: 2022-12-18 | End: 2022-12-18

## 2022-12-18 RX ADMIN — Medication 81 MILLIGRAM(S): at 11:12

## 2022-12-18 RX ADMIN — LIDOCAINE 1 PATCH: 4 CREAM TOPICAL at 06:47

## 2022-12-18 RX ADMIN — LISINOPRIL 10 MILLIGRAM(S): 2.5 TABLET ORAL at 06:46

## 2022-12-18 RX ADMIN — HYDROMORPHONE HYDROCHLORIDE 0.5 MILLIGRAM(S): 2 INJECTION INTRAMUSCULAR; INTRAVENOUS; SUBCUTANEOUS at 11:29

## 2022-12-18 RX ADMIN — LIDOCAINE 1 PATCH: 4 CREAM TOPICAL at 18:25

## 2022-12-18 RX ADMIN — GABAPENTIN 600 MILLIGRAM(S): 400 CAPSULE ORAL at 06:45

## 2022-12-18 RX ADMIN — HYDROMORPHONE HYDROCHLORIDE 0.5 MILLIGRAM(S): 2 INJECTION INTRAMUSCULAR; INTRAVENOUS; SUBCUTANEOUS at 23:35

## 2022-12-18 RX ADMIN — HYDROMORPHONE HYDROCHLORIDE 1 MILLIGRAM(S): 2 INJECTION INTRAMUSCULAR; INTRAVENOUS; SUBCUTANEOUS at 06:48

## 2022-12-18 RX ADMIN — HYDROMORPHONE HYDROCHLORIDE 0.5 MILLIGRAM(S): 2 INJECTION INTRAMUSCULAR; INTRAVENOUS; SUBCUTANEOUS at 11:14

## 2022-12-18 RX ADMIN — ENOXAPARIN SODIUM 40 MILLIGRAM(S): 100 INJECTION SUBCUTANEOUS at 17:57

## 2022-12-18 RX ADMIN — LIDOCAINE 1 PATCH: 4 CREAM TOPICAL at 07:24

## 2022-12-18 RX ADMIN — CLOPIDOGREL BISULFATE 75 MILLIGRAM(S): 75 TABLET, FILM COATED ORAL at 11:11

## 2022-12-18 RX ADMIN — HYDROMORPHONE HYDROCHLORIDE 0.5 MILLIGRAM(S): 2 INJECTION INTRAMUSCULAR; INTRAVENOUS; SUBCUTANEOUS at 23:09

## 2022-12-18 RX ADMIN — Medication 5 MILLIGRAM(S): at 17:56

## 2022-12-18 RX ADMIN — ATORVASTATIN CALCIUM 40 MILLIGRAM(S): 80 TABLET, FILM COATED ORAL at 23:09

## 2022-12-18 RX ADMIN — GABAPENTIN 600 MILLIGRAM(S): 400 CAPSULE ORAL at 23:08

## 2022-12-18 RX ADMIN — GABAPENTIN 600 MILLIGRAM(S): 400 CAPSULE ORAL at 14:08

## 2022-12-18 NOTE — PROGRESS NOTE ADULT - SUBJECTIVE AND OBJECTIVE BOX
Patient is a 67y old  Male who presents with a chief complaint of lower back pain, lower extremity pain (16 Dec 2022 13:15)      INTERVAL HPI/OVERNIGHT EVENTS: pt still complaining of pain--however willing to transition to PO meds     MEDICATIONS  (STANDING):  amLODIPine   Tablet 10 milliGRAM(s) Oral daily  aspirin  chewable 81 milliGRAM(s) Oral daily  atorvastatin 40 milliGRAM(s) Oral at bedtime  bisacodyl 5 milliGRAM(s) Oral every 12 hours  clopidogrel Tablet 75 milliGRAM(s) Oral daily  enoxaparin Injectable 40 milliGRAM(s) SubCutaneous every 24 hours  gabapentin 600 milliGRAM(s) Oral every 8 hours  influenza  Vaccine (HIGH DOSE) 0.7 milliLiter(s) IntraMuscular once  lidocaine   4% Patch 1 Patch Transdermal every 24 hours  lisinopril 10 milliGRAM(s) Oral daily  senna 2 Tablet(s) Oral at bedtime    MEDICATIONS  (PRN):  bisacodyl Suppository 10 milliGRAM(s) Rectal daily PRN Constipation  diphenhydrAMINE Injectable 25 milliGRAM(s) IV Push every 6 hours PRN Rash and/or Itching  HYDROmorphone  Injectable 0.5 milliGRAM(s) IV Push two times a day PRN Breakthrough pain  oxycodone    5 mG/acetaminophen 325 mG 1 Tablet(s) Oral every 6 hours PRN mild - moderate pain  oxycodone    5 mG/acetaminophen 325 mG 2 Tablet(s) Oral every 6 hours PRN Severe Pain (7 - 10)      Allergies    ampicillin (Unknown)  pork (Vomiting)  shellfish (Rash)    Intolerances        REVIEW OF SYSTEMS:  CONSTITUTIONAL: No fever, weight loss, or fatigue  EYES: No eye pain, visual disturbances, or discharge  ENMT:  No difficulty hearing, tinnitus, vertigo; No sinus or throat pain  NECK: No pain or stiffness  BREASTS: No pain, masses, or nipple discharge  RESPIRATORY: No cough, wheezing, chills or hemoptysis; No shortness of breath  CARDIOVASCULAR: No chest pain, palpitations, dizziness, or leg swelling  GASTROINTESTINAL: No abdominal or epigastric pain. No nausea, vomiting, or hematemesis; No diarrhea or constipation. No melena or hematochezia.  GENITOURINARY: No dysuria, frequency, hematuria, or incontinence  NEUROLOGICAL: No headaches, memory loss, loss of strength, numbness, or tremors  SKIN: No itching, burning, rashes, or lesions   LYMPH NODES: No enlarged glands  ENDOCRINE: No heat or cold intolerance; No hair loss  MUSCULOSKELETAL: No joint pain or swelling; No muscle, back, or extremity pain  PSYCHIATRIC: No depression, anxiety, mood swings, or difficulty sleeping  HEME/LYMPH: No easy bruising, or bleeding gums  ALLERGY AND IMMUNOLOGIC: No hives or eczema    Vital Signs Last 24 Hrs  ICU Vital Signs Last 24 Hrs  T(C): 37.3 (18 Dec 2022 11:01), Max: 37.3 (18 Dec 2022 11:01)  T(F): 99.1 (18 Dec 2022 11:01), Max: 99.1 (18 Dec 2022 11:01)  HR: 70 (18 Dec 2022 11:01) (55 - 70)  BP: 125/68 (18 Dec 2022 11:01) (109/76 - 136/74)  BP(mean): --  ABP: --  ABP(mean): --  RR: 17 (18 Dec 2022 11:01) (17 - 18)  SpO2: 100% (18 Dec 2022 11:01) (96% - 100%)    O2 Parameters below as of 18 Dec 2022 11:01  Patient On (Oxygen Delivery Method): room air              PHYSICAL EXAM:  GENERAL: NAD, well-groomed, well-developed  HEAD:  Atraumatic, Normocephalic  EYES: EOMI, PERRLA, conjunctiva and sclera clear  ENMT: No tonsillar erythema, exudates, or enlargement; Moist mucous membranes, Good dentition, No lesions  NECK: Supple, No JVD, Normal thyroid  NERVOUS SYSTEM:  Alert & Oriented X3, Good concentration; Motor Strength 5/5 B/L upper and lower extremities; DTRs 2+ intact and symmetric  CHEST/LUNG: Clear to percussion bilaterally; No rales, rhonchi, wheezing, or rubs  HEART: Regular rate and rhythm; No murmurs, rubs, or gallops  ABDOMEN: Soft, Nontender, Nondistended; Bowel sounds present  EXTREMITIES:  2+ Peripheral Pulses, No clubbing, cyanosis, or edema  LYMPH: No lymphadenopathy noted  SKIN: No rashes or lesions

## 2022-12-18 NOTE — CHART NOTE - NSCHARTNOTEFT_GEN_A_CORE
Pt seen for follow-up on medical floor w/ low back pain ; lumbar stenosis ; hx cocaine abuse ; peripheral neuropathy ; essential HTN  ;strabismic amblyopia ; DM 2 ( HgbA1c = 6.9 %).    Factors impacting intake: [x ] none [ ] nausea  [ ] vomiting [ ] diarrhea [ ] constipation  [ ]chewing problems [ ] swallowing issues  [ ] other:     Diet Prescription: Diet, DASH/TLC:   Sodium & Cholesterol Restricted (12-11-22 @ 18:12)    Intake: Pt is consuming 75 - 100 % Meals. Refusing diabetic diet. Informed pt w/ RN in attendance his blood sugar is elevated and his HgbA1c is in diabetic range. Soda and juice at bedside. Pt stated he would make better choices with choosing low carb foods and simple sugars.     Current Weight: 12/3 - 214.2 (97.2 kg)  % Weight Change - no weights to trend since admission    Physical Appearance - Pt refused Nfpe.       Pertinent Medications: MEDICATIONS  (STANDING):  amLODIPine   Tablet 10 milliGRAM(s) Oral daily  aspirin  chewable 81 milliGRAM(s) Oral daily  atorvastatin 40 milliGRAM(s) Oral at bedtime  bisacodyl 5 milliGRAM(s) Oral every 12 hours  clopidogrel Tablet 75 milliGRAM(s) Oral daily  enoxaparin Injectable 40 milliGRAM(s) SubCutaneous every 24 hours  gabapentin 600 milliGRAM(s) Oral every 8 hours  influenza  Vaccine (HIGH DOSE) 0.7 milliLiter(s) IntraMuscular once  lidocaine   4% Patch 1 Patch Transdermal every 24 hours  lisinopril 10 milliGRAM(s) Oral daily  senna 2 Tablet(s) Oral at bedtime    MEDICATIONS  (PRN):  bisacodyl Suppository 10 milliGRAM(s) Rectal daily PRN Constipation  diphenhydrAMINE Injectable 25 milliGRAM(s) IV Push every 6 hours PRN Rash and/or Itching  HYDROmorphone  Injectable 0.5 milliGRAM(s) IV Push two times a day PRN Breakthrough pain  oxycodone    5 mG/acetaminophen 325 mG 1 Tablet(s) Oral every 6 hours PRN mild - moderate pain  oxycodone    5 mG/acetaminophen 325 mG 2 Tablet(s) Oral every 6 hours PRN Severe Pain (7 - 10)    Pertinent Labs:  12-14 Phos 4.4 mg/dL 12-11 Alb 3.2 g/dL<L> 12-03 Chol 169 mg/dL LDL --    HDL 38 mg/dL<L> Trig 99 mg/dL12-11 ALT 62 U/L AST 28 U/L Alkaline Phosphatase 73 [id number]/[id number] @ 10:10 A1C 6.9       CAPILLARY BLOOD GLUCOSE        Skin: WDL    Estimated Needs:   [x ] no change since previous assessment (12/11)  [ ] recalculated:     Previous Nutrition Diagnosis:   Nutrition Diagnostic Terminology #1	Altered Nutrition Related Lab Values  Etiology	Endocrine dysfunction (preDM/T2DM)  Signs/Symptoms	elevated HbA1c  Goal/Expected Outcome	Pt to reach inpatient blood glucose level goals 140-180mg/dL-> NOT MET    Nutrition Diagnosis is [x ] ongoing  [ ] resolved [ ] not applicable     New Nutrition Diagnosis: [x ] not applicable       Interventions:   Recommend-> Continue current diet as Rx'd  [ ] Change Diet To:  [ ] Nutrition Supplement  [ ] Nutrition Support  [ ] Other:     Monitoring and Evaluation:   [ ] PO intake [ x ] Tolerance to diet prescription [ x ] weights [ x ] labs[ x ] follow up per protocol  [ ] other:

## 2022-12-19 VITALS
DIASTOLIC BLOOD PRESSURE: 62 MMHG | SYSTOLIC BLOOD PRESSURE: 138 MMHG | RESPIRATION RATE: 18 BRPM | HEART RATE: 63 BPM | TEMPERATURE: 99 F | OXYGEN SATURATION: 97 %

## 2022-12-19 LAB
ANION GAP SERPL CALC-SCNC: 5 MMOL/L — SIGNIFICANT CHANGE UP (ref 5–17)
BUN SERPL-MCNC: 18 MG/DL — SIGNIFICANT CHANGE UP (ref 7–23)
CALCIUM SERPL-MCNC: 9.4 MG/DL — SIGNIFICANT CHANGE UP (ref 8.5–10.1)
CHLORIDE SERPL-SCNC: 107 MMOL/L — SIGNIFICANT CHANGE UP (ref 96–108)
CO2 SERPL-SCNC: 28 MMOL/L — SIGNIFICANT CHANGE UP (ref 22–31)
CREAT SERPL-MCNC: 0.81 MG/DL — SIGNIFICANT CHANGE UP (ref 0.5–1.3)
EGFR: 98 ML/MIN/1.73M2 — SIGNIFICANT CHANGE UP
GLUCOSE SERPL-MCNC: 127 MG/DL — HIGH (ref 70–99)
HCT VFR BLD CALC: 37.2 % — LOW (ref 39–50)
HGB BLD-MCNC: 12.7 G/DL — LOW (ref 13–17)
MCHC RBC-ENTMCNC: 31.4 PG — SIGNIFICANT CHANGE UP (ref 27–34)
MCHC RBC-ENTMCNC: 34.1 G/DL — SIGNIFICANT CHANGE UP (ref 32–36)
MCV RBC AUTO: 91.9 FL — SIGNIFICANT CHANGE UP (ref 80–100)
NRBC # BLD: 0 /100 WBCS — SIGNIFICANT CHANGE UP (ref 0–0)
PLATELET # BLD AUTO: 246 K/UL — SIGNIFICANT CHANGE UP (ref 150–400)
POTASSIUM SERPL-MCNC: 4 MMOL/L — SIGNIFICANT CHANGE UP (ref 3.5–5.3)
POTASSIUM SERPL-SCNC: 4 MMOL/L — SIGNIFICANT CHANGE UP (ref 3.5–5.3)
RBC # BLD: 4.05 M/UL — LOW (ref 4.2–5.8)
RBC # FLD: 11.7 % — SIGNIFICANT CHANGE UP (ref 10.3–14.5)
SODIUM SERPL-SCNC: 140 MMOL/L — SIGNIFICANT CHANGE UP (ref 135–145)
WBC # BLD: 10.36 K/UL — SIGNIFICANT CHANGE UP (ref 3.8–10.5)
WBC # FLD AUTO: 10.36 K/UL — SIGNIFICANT CHANGE UP (ref 3.8–10.5)

## 2022-12-19 PROCEDURE — 99239 HOSP IP/OBS DSCHRG MGMT >30: CPT

## 2022-12-19 RX ADMIN — LIDOCAINE 1 PATCH: 4 CREAM TOPICAL at 06:19

## 2022-12-19 RX ADMIN — Medication 81 MILLIGRAM(S): at 13:58

## 2022-12-19 RX ADMIN — LIDOCAINE 1 PATCH: 4 CREAM TOPICAL at 07:40

## 2022-12-19 RX ADMIN — AMLODIPINE BESYLATE 10 MILLIGRAM(S): 2.5 TABLET ORAL at 06:19

## 2022-12-19 RX ADMIN — GABAPENTIN 600 MILLIGRAM(S): 400 CAPSULE ORAL at 13:36

## 2022-12-19 RX ADMIN — LISINOPRIL 10 MILLIGRAM(S): 2.5 TABLET ORAL at 06:19

## 2022-12-19 RX ADMIN — GABAPENTIN 600 MILLIGRAM(S): 400 CAPSULE ORAL at 06:19

## 2022-12-19 RX ADMIN — CLOPIDOGREL BISULFATE 75 MILLIGRAM(S): 75 TABLET, FILM COATED ORAL at 13:37

## 2022-12-19 NOTE — PROGRESS NOTE ADULT - ASSESSMENT
67yom w/ hx of HTN, HLD, neuropathy, lumbar stenosis, who presents with acute on chronic back and lower extremity pain, admitted for pain control. Also found to have occlusion of femoral artery, from peripheral vascular disease. 
67yom w/ hx of HTN, HLD, neuropathy, lumbar stenosis, who presents with acute on chronic back and lower extremity pain, admitted for pain control. Also found to have occlusion of femoral artery, from peripheral vascular disease. MR Rigoberto: severe stenosis L3-4, moderate stenosis L4-5, severe recess and foraminal impingement L5-S1.  Seen by Orthopedic surgery 12/4, recommended no acute surgery.  CT LE 12/2 showed Infrarenal abdominal aortic aneurysm, measuring 3.1 cm.  Occluded right internal iliac artery and bilateral femoral arteries.  One-vessel runoff to the right foot. Three-vessel runoff to the left foot, though with thread-like flow in the anterior and posterior tibial arteries and the peroneal artery. The left dorsalis pedis artery remains patent. Seen by IR, s/p successful left lower extremity arterial revascularization with stenting on 12/7.   He refused RLE angiogram on 12/12.        # Low Back Pain / Lumbar Stenosis - MR showing severe stenosis L3-4, moderate stenosis L4-5, severe recess and foraminal impingement L5-S1.   Seen by Orthopedic surgery 12/4, recommended no acute surgery.  Pain control with Tylenol, limited Percocet on discharge.  He is ambulating independently into hallway with cane.    # History of Cocaine abuse - pt and sister both expressed concerns of opiate dependence.   Pt and sister in agreement with outpatient regimen of Tylenol and limited Percocet.    # PVD - Seen by IR, s/p successful left lower extremity arterial revascularization with stenting on 12/7.    He refused RLE angiogram on 12/12.  Per IR, outpatient f/u for RLE angiogram if pt consents.   Continue antiplatelets and statin   # Peripheral Neuropathy - continue Neurontin.  # Essential HTN - Monitor BP.  Continue antihypertensives.  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  Outpatient opthalmology evaluation.  # Diabetes Type II - monitor fingersticks.  Insulin coverage for hyperglycemia.  # Inpatient DVT Prophylaxis - Lovenox subcut    Stable for d/c home- awaiting insurance resolution
67yom w/ hx of HTN, HLD, neuropathy, lumbar stenosis, who presents with acute on chronic back and lower extremity pain, admitted for pain control. Also found to have occlusion of femoral artery, from peripheral vascular disease. MR Rigoberto: severe stenosis L3-4, moderate stenosis L4-5, severe recess and foraminal impingement L5-S1.  Seen by Orthopedic surgery 12/4, recommended no acute surgery.  CT LE 12/2 showed Infrarenal abdominal aortic aneurysm, measuring 3.1 cm.  Occluded right internal iliac artery and bilateral femoral arteries.  One-vessel runoff to the right foot. Three-vessel runoff to the left foot, though with thread-like flow in the anterior and posterior tibial arteries and the peroneal artery. The left dorsalis pedis artery remains patent. Seen by IR, s/p successful left lower extremity arterial revascularization with stenting on 12/7.   He refused RLE angiogram on 12/12.        # Low Back Pain / Lumbar Stenosis - MR showing severe stenosis L3-4, moderate stenosis L4-5, severe recess and foraminal impingement L5-S1.   Seen by Orthopedic surgery 12/4, recommended no acute surgery.  Pain control with Tylenol, limited Percocet on discharge.  He is ambulating independently into hallway with cane.    # History of Cocaine abuse - pt and sister both expressed concerns of opiate dependence.   Pt and sister in agreement with outpatient regimen of Tylenol and limited Percocet.    # PVD - Seen by IR, s/p successful left lower extremity arterial revascularization with stenting on 12/7.    He refused RLE angiogram on 12/12.  Per IR, outpatient f/u for RLE angiogram if pt consents.   Continue antiplatelets and statin   # Peripheral Neuropathy - continue Neurontin.  # Essential HTN - Monitor BP.  Continue antihypertensives.  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  Outpatient opthalmology evaluation.  # Diabetes Type II - monitor fingersticks.  Insulin coverage for hyperglycemia.  # Inpatient DVT Prophylaxis - Lovenox subcut    Stable for d/c home. 
67yom w/ hx of HTN, HLD, neuropathy, lumbar stenosis, who presents with acute on chronic back and lower extremity pain, admitted for pain control. Also found to have occlusion of femoral artery, from peripheral vascular disease. MR Rigoberto: severe stenosis L3-4, moderate stenosis L4-5, severe recess and foraminal impingement L5-S1.  Seen by Orthopedic surgery 12/4, recommended no acute surgery.  CT LE 12/2 showed Infrarenal abdominal aortic aneurysm, measuring 3.1 cm.  Occluded right internal iliac artery and bilateral femoral arteries.  One-vessel runoff to the right foot. Three-vessel runoff to the left foot, though with thread-like flow in the anterior and posterior tibial arteries and the peroneal artery. The left dorsalis pedis artery remains patent. Seen by IR, s/p successful left lower extremity arterial revascularization with stenting on 12/7.   He refused RLE angiogram on 12/12.        # Low Back Pain / Lumbar Stenosis - MR showing severe stenosis L3-4, moderate stenosis L4-5, severe recess and foraminal impingement L5-S1.   Seen by Orthopedic surgery 12/4, recommended no acute surgery.  Pain control with Tylenol, limited Percocet on discharge.  He is ambulating independently into hallway with cane.    # History of Cocaine abuse - pt and sister both expressed concerns of opiate dependence.   Pt and sister in agreement with outpatient regimen.    # PVD - Seen by IR, s/p successful left lower extremity arterial revascularization with stenting on 12/7.    He refused RLE angiogram on 12/12.  Per IR, outpatient f/u for RLE angiogram if pt consents.   Continue antiplatelets and statin   # Peripheral Neuropathy - continue Neurontin. Pt now complaining of worsening neuropathy on left lower extremity---requesting IV dilaudid---will switch to PO dilaudid BID- raised understanding that needs to get off opiods-but also does not want to go home with pain. Patient will need close follow up with Pain Managment     # Essential HTN - Monitor BP.  Continue antihypertensives.  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  Outpatient opthalmology evaluation.  # Diabetes Type II - monitor fingersticks.  Insulin coverage for hyperglycemia.  # Inpatient DVT Prophylaxis - Lovenox subcut    Stable for d/c home- awaiting insurance resolution if able to obtain medications on discharge. 
67yom w/ hx of HTN, HLD, neuropathy, lumbar stenosis, who presents with acute on chronic back and lower extremity pain, admitted for pain control. Also found to have occlusion of femoral artery, from peripheral vascular disease. MR Rigoberto: severe stenosis L3-4, moderate stenosis L4-5, severe recess and foraminal impingement L5-S1.  Seen by Orthopedic surgery 12/4, recommended no acute surgery.  CT LE 12/2 showed Infrarenal abdominal aortic aneurysm, measuring 3.1 cm.  Occluded right internal iliac artery and bilateral femoral arteries.  One-vessel runoff to the right foot. Three-vessel runoff to the left foot, though with thread-like flow in the anterior and posterior tibial arteries and the peroneal artery. The left dorsalis pedis artery remains patent. Seen by IR, s/p successful left lower extremity arterial revascularization with stenting on 12/7.   He refused RLE angiogram on 12/12.        # Low Back Pain / Lumbar Stenosis - MR showing severe stenosis L3-4, moderate stenosis L4-5, severe recess and foraminal impingement L5-S1.   Seen by Orthopedic surgery 12/4, recommended no acute surgery.  Pain control.  Will attempt better optimization of pain control with Percocet.   # History of Cocaine abuse - pt and sister both expressed concerns of opiate dependence.  As he is currently on Dilaudid IV, I discussed tapering as quickly as possible to po Percocet, with goal of ultimately controlling pain with non opiate modalities.  Pt and sister in agreement.    # PVD - Seen by IR, s/p successful left lower extremity arterial revascularization with stenting on 12/7.    He refused RLE angiogram on 12/12.  Per IR, outpatient f/u for RLE angiogram if pt consents.   Continue antiplatelets and statin   # Peripheral Neuropathy - continue Neurontin.  # Essential HTN - Monitor BP.  Continue antihypertensives.  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  Outpatient opthalmology evaluation.  # Diabetes Type II - monitor fingersticks.  Insulin coverage for hyperglycemia.  # Inpatient DVT Prophylaxis - Lovenox subcut    Possible d/c in 24-48h pending pain control. 
67yom w/ hx of HTN, HLD, neuropathy, lumbar stenosis, who presents with acute on chronic back and lower extremity pain, admitted for pain control. Also found to have occlusion of femoral artery, from peripheral vascular disease. MR Rigoberto: severe stenosis L3-4, moderate stenosis L4-5, severe recess and foraminal impingement L5-S1.  Seen by Orthopedic surgery 12/4, recommended no acute surgery.  CT LE 12/2 showed Infrarenal abdominal aortic aneurysm, measuring 3.1 cm.  Occluded right internal iliac artery and bilateral femoral arteries.  One-vessel runoff to the right foot. Three-vessel runoff to the left foot, though with thread-like flow in the anterior and posterior tibial arteries and the peroneal artery. The left dorsalis pedis artery remains patent. Seen by IR, s/p successful left lower extremity arterial revascularization with stenting on 12/7.   He refused RLE angiogram on 12/12.        # Low Back Pain / Lumbar Stenosis - MR showing severe stenosis L3-4, moderate stenosis L4-5, severe recess and foraminal impingement L5-S1.   Seen by Orthopedic surgery 12/4, recommended no acute surgery.  Pain control with Tylenol, limited Percocet on discharge.  He is ambulating independently into hallway with cane.    # History of Cocaine abuse - pt and sister both expressed concerns of opiate dependence.   Pt and sister in agreement with outpatient regimen.    # PVD - Seen by IR, s/p successful left lower extremity arterial revascularization with stenting on 12/7.    He refused RLE angiogram on 12/12.  Per IR, outpatient f/u for RLE angiogram if pt consents.   Continue antiplatelets and statin   # Peripheral Neuropathy - continue Neurontin. Pt now complaining of worsening neuropathy on left lower extremity---requesting IV dilaudid---will switch to PO dilaudid BID- raised understanding that needs to get off opiods-but also does not want to go home with pain. Today pain improved- pt agrees to go home only on Percocet.  Patient will need close follow up with Pain Managment -raised understanding    # Essential HTN - Monitor BP.  Continue antihypertensives.  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  Outpatient opthalmology evaluation.  # Diabetes Type II - monitor fingersticks.  Insulin coverage for hyperglycemia.  # Inpatient DVT Prophylaxis - Lovenox subcut    Stable for d/c home today, insurance issues resolved 
67yom w/ hx of HTN, HLD, neuropathy, lumbar stenosis, who presents with acute on chronic back and lower extremity pain, admitted for pain control. Also found to have occlusion of femoral artery, from peripheral vascular disease. MR Rigoberto: severe stenosis L3-4, moderate stenosis L4-5, severe recess and foraminal impingement L5-S1.  Seen by Orthopedic surgery 12/4, recommended no acute surgery.  CT LE 12/2 showed Infrarenal abdominal aortic aneurysm, measuring 3.1 cm.  Occluded right internal iliac artery and bilateral femoral arteries.  One-vessel runoff to the right foot. Three-vessel runoff to the left foot, though with thread-like flow in the anterior and posterior tibial arteries and the peroneal artery. The left dorsalis pedis artery remains patent. Seen by IR, s/p successful left lower extremity arterial revascularization with stenting on 12/7.   He refused RLE angiogram on 12/12.        # Low Back Pain / Lumbar Stenosis - MR showing severe stenosis L3-4, moderate stenosis L4-5, severe recess and foraminal impingement L5-S1.   Seen by Orthopedic surgery 12/4, recommended no acute surgery.  Pain control.  Will attempt better optimization of pain control with Percocet.   # PVD - Seen by IR, s/p successful left lower extremity arterial revascularization with stenting on 12/7.    He refused RLE angiogram on 12/12.  Per IR, outpatient f/u for RLE angiogram if pt consents.   Continue antiplatelets and statin   # Peripheral Neuropathy - continue Neurontin.  # Essential HTN - Monitor BP.  Continue antihypertensives.  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  Outpatient opthalmology evaluation.  # Diabetes Type II - monitor fingersticks.  Insulin coverage for hyperglycemia.  # Inpatient DVT Prophylaxis - Lovenox subcut  
 67yom w/ hx of HTN, HLD, neuropathy, lumbar stenosis, who presents with acute on chronic back and lower extremity pain, admitted for pain control. Also found to have occlusion of femoral artery, from peripheral vascular disease. 

## 2022-12-19 NOTE — PROGRESS NOTE ADULT - PROVIDER SPECIALTY LIST ADULT
Hospitalist
Intervent Radiology
Hospitalist
Hospitalist
Orthopedics
Hospitalist
Hospitalist
Orthopedics
Hospitalist
52
Hospitalist

## 2022-12-19 NOTE — PROGRESS NOTE ADULT - SUBJECTIVE AND OBJECTIVE BOX
Patient is a 67y old  Male who presents with a chief complaint of lower back pain, lower extremity pain (16 Dec 2022 13:15)      INTERVAL HPI/OVERNIGHT EVENTS: pt doing well, denies pain today    MEDICATIONS  (STANDING):  amLODIPine   Tablet 10 milliGRAM(s) Oral daily  aspirin  chewable 81 milliGRAM(s) Oral daily  atorvastatin 40 milliGRAM(s) Oral at bedtime  bisacodyl 5 milliGRAM(s) Oral every 12 hours  clopidogrel Tablet 75 milliGRAM(s) Oral daily  enoxaparin Injectable 40 milliGRAM(s) SubCutaneous every 24 hours  gabapentin 600 milliGRAM(s) Oral every 8 hours  influenza  Vaccine (HIGH DOSE) 0.7 milliLiter(s) IntraMuscular once  lidocaine   4% Patch 1 Patch Transdermal every 24 hours  lisinopril 10 milliGRAM(s) Oral daily  senna 2 Tablet(s) Oral at bedtime    MEDICATIONS  (PRN):  bisacodyl Suppository 10 milliGRAM(s) Rectal daily PRN Constipation  diphenhydrAMINE Injectable 25 milliGRAM(s) IV Push every 6 hours PRN Rash and/or Itching  oxycodone    5 mG/acetaminophen 325 mG 1 Tablet(s) Oral every 6 hours PRN mild - moderate pain        Allergies    ampicillin (Unknown)  pork (Vomiting)  shellfish (Rash)    Intolerances        REVIEW OF SYSTEMS:  CONSTITUTIONAL: No fever, weight loss, or fatigue  EYES: No eye pain, visual disturbances, or discharge  ENMT:  No difficulty hearing, tinnitus, vertigo; No sinus or throat pain  NECK: No pain or stiffness  BREASTS: No pain, masses, or nipple discharge  RESPIRATORY: No cough, wheezing, chills or hemoptysis; No shortness of breath  CARDIOVASCULAR: No chest pain, palpitations, dizziness, or leg swelling  GASTROINTESTINAL: No abdominal or epigastric pain. No nausea, vomiting, or hematemesis; No diarrhea or constipation. No melena or hematochezia.  GENITOURINARY: No dysuria, frequency, hematuria, or incontinence  NEUROLOGICAL: No headaches, memory loss, loss of strength, numbness, or tremors  SKIN: No itching, burning, rashes, or lesions   LYMPH NODES: No enlarged glands  ENDOCRINE: No heat or cold intolerance; No hair loss  MUSCULOSKELETAL: No joint pain or swelling; No muscle, back, or extremity pain  PSYCHIATRIC: No depression, anxiety, mood swings, or difficulty sleeping  HEME/LYMPH: No easy bruising, or bleeding gums  ALLERGY AND IMMUNOLOGIC: No hives or eczema    Vital Signs Last 24 Hrs  ICU Vital Signs Last 24 Hrs  T(C): 36.9 (19 Dec 2022 06:12), Max: 37.2 (18 Dec 2022 17:09)  T(F): 98.5 (19 Dec 2022 06:12), Max: 98.9 (18 Dec 2022 17:09)  HR: 72 (19 Dec 2022 06:12) (60 - 72)  BP: 121/74 (19 Dec 2022 06:12) (117/67 - 122/66)  BP(mean): --  ABP: --  ABP(mean): --  RR: 17 (19 Dec 2022 06:12) (17 - 17)  SpO2: 95% (19 Dec 2022 06:12) (95% - 97%)    O2 Parameters below as of 18 Dec 2022 17:09  Patient On (Oxygen Delivery Method): room air          PHYSICAL EXAM:  GENERAL: NAD, well-groomed, well-developed  HEAD:  Atraumatic, Normocephalic  EYES: EOMI, PERRLA, conjunctiva and sclera clear  ENMT: No tonsillar erythema, exudates, or enlargement; Moist mucous membranes, Good dentition, No lesions  NECK: Supple, No JVD,  NERVOUS SYSTEM:  Alert & Oriented X3, Good concentration; Motor Strength 5/5 B/L upper and lower extremities; DTRs 2+ intact and symmetric  CHEST/LUNG: Clear to percussion bilaterally; No rales, rhonchi, wheezing, or rubs  HEART: Regular rate and rhythm; No murmurs, rubs, or gallops  ABDOMEN: Soft, Nontender, Nondistended; Bowel sounds present  EXTREMITIES:  2+ Peripheral Pulses, No clubbing, cyanosis, or edema  SKIN: No rashes or lesions

## 2022-12-19 NOTE — PROGRESS NOTE ADULT - REASON FOR ADMISSION
lower back pain, lower extremity pain

## 2022-12-22 DIAGNOSIS — H44.701: ICD-10-CM

## 2022-12-22 DIAGNOSIS — Z18.9 RETAINED FOREIGN BODY FRAGMENTS, UNSPECIFIED MATERIAL: ICD-10-CM

## 2022-12-22 DIAGNOSIS — Z79.1 LONG TERM (CURRENT) USE OF NON-STEROIDAL ANTI-INFLAMMATORIES (NSAID): ICD-10-CM

## 2022-12-22 DIAGNOSIS — E78.5 HYPERLIPIDEMIA, UNSPECIFIED: ICD-10-CM

## 2022-12-22 DIAGNOSIS — M47.16 OTHER SPONDYLOSIS WITH MYELOPATHY, LUMBAR REGION: ICD-10-CM

## 2022-12-22 DIAGNOSIS — M21.371 FOOT DROP, RIGHT FOOT: ICD-10-CM

## 2022-12-22 DIAGNOSIS — E11.42 TYPE 2 DIABETES MELLITUS WITH DIABETIC POLYNEUROPATHY: ICD-10-CM

## 2022-12-22 DIAGNOSIS — H53.031 STRABISMIC AMBLYOPIA, RIGHT EYE: ICD-10-CM

## 2022-12-22 DIAGNOSIS — I73.9 PERIPHERAL VASCULAR DISEASE, UNSPECIFIED: ICD-10-CM

## 2022-12-22 DIAGNOSIS — M19.90 UNSPECIFIED OSTEOARTHRITIS, UNSPECIFIED SITE: ICD-10-CM

## 2022-12-22 DIAGNOSIS — F17.210 NICOTINE DEPENDENCE, CIGARETTES, UNCOMPLICATED: ICD-10-CM

## 2022-12-22 DIAGNOSIS — I10 ESSENTIAL (PRIMARY) HYPERTENSION: ICD-10-CM

## 2022-12-22 DIAGNOSIS — I70.222 ATHEROSCLEROSIS OF NATIVE ARTERIES OF EXTREMITIES WITH REST PAIN, LEFT LEG: ICD-10-CM

## 2022-12-22 DIAGNOSIS — F14.10 COCAINE ABUSE, UNCOMPLICATED: ICD-10-CM

## 2022-12-22 DIAGNOSIS — E11.51 TYPE 2 DIABETES MELLITUS WITH DIABETIC PERIPHERAL ANGIOPATHY WITHOUT GANGRENE: ICD-10-CM

## 2022-12-22 DIAGNOSIS — M47.26 OTHER SPONDYLOSIS WITH RADICULOPATHY, LUMBAR REGION: ICD-10-CM

## 2022-12-22 DIAGNOSIS — M48.061 SPINAL STENOSIS, LUMBAR REGION WITHOUT NEUROGENIC CLAUDICATION: ICD-10-CM

## 2022-12-22 NOTE — CHART NOTE - NSCHARTNOTESELECT_GEN_ALL_CORE
Event Note
Nutrition Services
d/c appointment/Event Note
Event Note
IR
IR
IR Consult/Event Note
Orthopedics/Event Note
d/c appointment/Event Note

## 2025-04-06 PROBLEM — Z00.00 ENCOUNTER FOR PREVENTIVE HEALTH EXAMINATION: Status: ACTIVE | Noted: 2025-04-06

## 2025-04-11 ENCOUNTER — APPOINTMENT (OUTPATIENT)
Dept: PULMONOLOGY | Facility: CLINIC | Age: 70
End: 2025-04-11
Payer: MEDICARE

## 2025-04-11 VITALS
OXYGEN SATURATION: 99 % | SYSTOLIC BLOOD PRESSURE: 147 MMHG | BODY MASS INDEX: 24.36 KG/M2 | WEIGHT: 200 LBS | HEART RATE: 59 BPM | HEIGHT: 76 IN | TEMPERATURE: 97.3 F | DIASTOLIC BLOOD PRESSURE: 70 MMHG

## 2025-04-11 DIAGNOSIS — Z83.3 FAMILY HISTORY OF DIABETES MELLITUS: ICD-10-CM

## 2025-04-11 DIAGNOSIS — Z80.1 FAMILY HISTORY OF MALIGNANT NEOPLASM OF TRACHEA, BRONCHUS AND LUNG: ICD-10-CM

## 2025-04-11 DIAGNOSIS — Z82.49 FAMILY HISTORY OF ISCHEMIC HEART DISEASE AND OTHER DISEASES OF THE CIRCULATORY SYSTEM: ICD-10-CM

## 2025-04-11 DIAGNOSIS — I10 ESSENTIAL (PRIMARY) HYPERTENSION: ICD-10-CM

## 2025-04-11 DIAGNOSIS — F17.200 NICOTINE DEPENDENCE, UNSPECIFIED, UNCOMPLICATED: ICD-10-CM

## 2025-04-11 DIAGNOSIS — R91.1 SOLITARY PULMONARY NODULE: ICD-10-CM

## 2025-04-11 DIAGNOSIS — Z82.3 FAMILY HISTORY OF STROKE: ICD-10-CM

## 2025-04-11 PROCEDURE — 99204 OFFICE O/P NEW MOD 45 MIN: CPT | Mod: 25

## 2025-04-11 PROCEDURE — 99407 BEHAV CHNG SMOKING > 10 MIN: CPT

## 2025-04-11 RX ORDER — VENLAFAXINE 37.5 MG/1
37.5 TABLET ORAL DAILY
Refills: 0 | Status: ACTIVE | COMMUNITY

## 2025-04-11 RX ORDER — SENNOSIDES 8.6 MG/1
8.6 TABLET ORAL
Refills: 0 | Status: ACTIVE | COMMUNITY

## 2025-04-11 RX ORDER — GABAPENTIN 300 MG/1
300 CAPSULE ORAL TWICE DAILY
Refills: 0 | Status: ACTIVE | COMMUNITY

## 2025-04-11 RX ORDER — AMLODIPINE BESYLATE 5 MG/1
5 TABLET ORAL DAILY
Refills: 0 | Status: ACTIVE | COMMUNITY

## 2025-04-11 RX ORDER — IBUPROFEN 800 MG/1
800 TABLET, FILM COATED ORAL
Refills: 0 | Status: ACTIVE | COMMUNITY

## 2025-04-11 RX ORDER — METFORMIN HYDROCHLORIDE 500 MG/1
500 TABLET, COATED ORAL DAILY
Refills: 0 | Status: ACTIVE | COMMUNITY

## 2025-04-11 RX ORDER — MELOXICAM 15 MG/1
15 TABLET ORAL DAILY
Refills: 0 | Status: ACTIVE | COMMUNITY

## 2025-04-11 RX ORDER — DOCUSATE SODIUM 50 MG
50 CAPSULE ORAL
Refills: 0 | Status: ACTIVE | COMMUNITY

## 2025-04-11 RX ORDER — NICOTINE 14MG/24HR
14 PATCH, TRANSDERMAL 24 HOURS TRANSDERMAL
Refills: 0 | Status: ACTIVE | COMMUNITY

## 2025-04-21 NOTE — PHYSICAL THERAPY INITIAL EVALUATION ADULT - ORIENTATION, REHAB EVAL
Detail Level: Detailed Quality 226: Preventive Care And Screening: Tobacco Use: Screening And Cessation Intervention: Patient screened for tobacco use, is a smoker AND received Cessation Counseling within measurement period or in the six months prior to the measurement period oriented to person, place, time and situation

## 2025-04-25 NOTE — PROGRESS NOTE ADULT - PROBLEM/PLAN-3
[FreeTextEntry1] : Probable MELISSA
DISPLAY PLAN FREE TEXT

## 2025-05-02 ENCOUNTER — APPOINTMENT (OUTPATIENT)
Dept: PULMONOLOGY | Facility: CLINIC | Age: 70
End: 2025-05-02
Payer: MEDICARE

## 2025-05-02 VITALS
SYSTOLIC BLOOD PRESSURE: 154 MMHG | OXYGEN SATURATION: 97 % | WEIGHT: 201 LBS | HEIGHT: 76 IN | HEART RATE: 68 BPM | TEMPERATURE: 98.7 F | DIASTOLIC BLOOD PRESSURE: 76 MMHG | BODY MASS INDEX: 24.48 KG/M2

## 2025-05-02 DIAGNOSIS — F17.200 NICOTINE DEPENDENCE, UNSPECIFIED, UNCOMPLICATED: ICD-10-CM

## 2025-05-02 PROCEDURE — 99214 OFFICE O/P EST MOD 30 MIN: CPT

## 2025-05-15 ENCOUNTER — NON-APPOINTMENT (OUTPATIENT)
Age: 70
End: 2025-05-15

## 2025-05-22 ENCOUNTER — APPOINTMENT (OUTPATIENT)
Dept: PULMONOLOGY | Facility: CLINIC | Age: 70
End: 2025-05-22
Payer: MEDICARE

## 2025-05-22 VITALS
WEIGHT: 203 LBS | HEART RATE: 69 BPM | HEIGHT: 76 IN | SYSTOLIC BLOOD PRESSURE: 167 MMHG | OXYGEN SATURATION: 99 % | DIASTOLIC BLOOD PRESSURE: 74 MMHG | BODY MASS INDEX: 24.72 KG/M2

## 2025-05-22 DIAGNOSIS — F17.200 NICOTINE DEPENDENCE, UNSPECIFIED, UNCOMPLICATED: ICD-10-CM

## 2025-05-22 DIAGNOSIS — J43.9 EMPHYSEMA, UNSPECIFIED: ICD-10-CM

## 2025-05-22 DIAGNOSIS — R91.1 SOLITARY PULMONARY NODULE: ICD-10-CM

## 2025-05-22 PROCEDURE — 99407 BEHAV CHNG SMOKING > 10 MIN: CPT

## 2025-05-22 PROCEDURE — 94729 DIFFUSING CAPACITY: CPT

## 2025-05-22 PROCEDURE — 94060 EVALUATION OF WHEEZING: CPT

## 2025-05-22 PROCEDURE — 99215 OFFICE O/P EST HI 40 MIN: CPT | Mod: 25

## 2025-05-22 PROCEDURE — 94727 GAS DIL/WSHOT DETER LNG VOL: CPT

## 2025-05-22 RX ORDER — FLUTICASONE FUROATE, UMECLIDINIUM BROMIDE AND VILANTEROL TRIFENATATE 100; 62.5; 25 UG/1; UG/1; UG/1
100-62.5-25 POWDER RESPIRATORY (INHALATION)
Qty: 1 | Refills: 3 | Status: ACTIVE | COMMUNITY
Start: 2025-05-22 | End: 1900-01-01

## 2025-06-04 ENCOUNTER — APPOINTMENT (OUTPATIENT)
Dept: THORACIC SURGERY | Facility: CLINIC | Age: 70
End: 2025-06-04
Payer: MEDICARE

## 2025-06-04 VITALS
DIASTOLIC BLOOD PRESSURE: 80 MMHG | HEIGHT: 76 IN | WEIGHT: 200 LBS | RESPIRATION RATE: 17 BRPM | HEART RATE: 56 BPM | BODY MASS INDEX: 24.36 KG/M2 | SYSTOLIC BLOOD PRESSURE: 136 MMHG | OXYGEN SATURATION: 98 %

## 2025-06-04 DIAGNOSIS — Z87.09 PERSONAL HISTORY OF OTHER DISEASES OF THE RESPIRATORY SYSTEM: ICD-10-CM

## 2025-06-04 DIAGNOSIS — Z85.46 PERSONAL HISTORY OF MALIGNANT NEOPLASM OF PROSTATE: ICD-10-CM

## 2025-06-04 DIAGNOSIS — Z86.79 PERSONAL HISTORY OF OTHER DISEASES OF THE CIRCULATORY SYSTEM: ICD-10-CM

## 2025-06-04 DIAGNOSIS — R91.1 SOLITARY PULMONARY NODULE: ICD-10-CM

## 2025-06-04 DIAGNOSIS — Z86.39 PERSONAL HISTORY OF OTHER ENDOCRINE, NUTRITIONAL AND METABOLIC DISEASE: ICD-10-CM

## 2025-06-04 PROCEDURE — 99205 OFFICE O/P NEW HI 60 MIN: CPT

## 2025-07-17 ENCOUNTER — APPOINTMENT (OUTPATIENT)
Dept: PULMONOLOGY | Facility: CLINIC | Age: 70
End: 2025-07-17
Payer: MEDICARE

## 2025-07-17 VITALS
SYSTOLIC BLOOD PRESSURE: 120 MMHG | HEART RATE: 63 BPM | RESPIRATION RATE: 16 BRPM | HEIGHT: 76 IN | DIASTOLIC BLOOD PRESSURE: 76 MMHG | WEIGHT: 202 LBS | TEMPERATURE: 98.5 F | OXYGEN SATURATION: 98 % | BODY MASS INDEX: 24.6 KG/M2

## 2025-07-17 PROBLEM — Z23 ENCOUNTER FOR IMMUNIZATION: Status: ACTIVE | Noted: 2025-07-17 | Resolved: 2025-07-31

## 2025-07-17 PROBLEM — J43.9 COPD WITH EMPHYSEMA: Status: ACTIVE | Noted: 2025-05-22

## 2025-07-17 PROCEDURE — 99215 OFFICE O/P EST HI 40 MIN: CPT

## 2025-08-11 ENCOUNTER — NON-APPOINTMENT (OUTPATIENT)
Age: 70
End: 2025-08-11

## 2025-09-09 ENCOUNTER — NON-APPOINTMENT (OUTPATIENT)
Age: 70
End: 2025-09-09

## 2025-09-17 ENCOUNTER — APPOINTMENT (OUTPATIENT)
Dept: THORACIC SURGERY | Facility: CLINIC | Age: 70
End: 2025-09-17
Payer: MEDICARE

## 2025-09-17 VITALS
HEART RATE: 66 BPM | BODY MASS INDEX: 24.36 KG/M2 | WEIGHT: 200 LBS | HEIGHT: 76 IN | SYSTOLIC BLOOD PRESSURE: 110 MMHG | DIASTOLIC BLOOD PRESSURE: 69 MMHG | OXYGEN SATURATION: 99 %

## 2025-09-17 DIAGNOSIS — R91.1 SOLITARY PULMONARY NODULE: ICD-10-CM

## 2025-09-17 PROCEDURE — 99213 OFFICE O/P EST LOW 20 MIN: CPT
